# Patient Record
Sex: MALE | HISPANIC OR LATINO | Employment: PART TIME | URBAN - METROPOLITAN AREA
[De-identification: names, ages, dates, MRNs, and addresses within clinical notes are randomized per-mention and may not be internally consistent; named-entity substitution may affect disease eponyms.]

---

## 2021-05-23 ENCOUNTER — HOSPITAL ENCOUNTER (OUTPATIENT)
Facility: HOSPITAL | Age: 51
Setting detail: OBSERVATION
Discharge: HOME | End: 2021-05-24
Attending: EMERGENCY MEDICINE | Admitting: EMERGENCY MEDICINE

## 2021-05-23 ENCOUNTER — APPOINTMENT (OUTPATIENT)
Dept: GENERAL RADIOLOGY | Facility: HOSPITAL | Age: 51
End: 2021-05-23

## 2021-05-23 DIAGNOSIS — N18.6 ESRD (END STAGE RENAL DISEASE) ON DIALYSIS (HCC): ICD-10-CM

## 2021-05-23 DIAGNOSIS — E87.5 HYPERKALEMIA: Primary | ICD-10-CM

## 2021-05-23 DIAGNOSIS — Z99.2 ESRD (END STAGE RENAL DISEASE) ON DIALYSIS (HCC): ICD-10-CM

## 2021-05-23 LAB
ANION GAP SERPL CALCULATED.3IONS-SCNC: 19 MMOL/L (ref 5–15)
ANION GAP SERPL CALCULATED.3IONS-SCNC: 19 MMOL/L (ref 5–15)
BASOPHILS # BLD AUTO: 0.1 10*3/MM3 (ref 0–0.2)
BASOPHILS NFR BLD AUTO: 1.7 % (ref 0–1.5)
BUN SERPL-MCNC: 64 MG/DL (ref 6–20)
BUN SERPL-MCNC: 66 MG/DL (ref 6–20)
BUN/CREAT SERPL: 4.8 (ref 7–25)
BUN/CREAT SERPL: 5 (ref 7–25)
CALCIUM SPEC-SCNC: 8.5 MG/DL (ref 8.6–10.5)
CALCIUM SPEC-SCNC: 8.6 MG/DL (ref 8.6–10.5)
CHLORIDE SERPL-SCNC: 101 MMOL/L (ref 98–107)
CHLORIDE SERPL-SCNC: 102 MMOL/L (ref 98–107)
CO2 SERPL-SCNC: 15 MMOL/L (ref 22–29)
CO2 SERPL-SCNC: 15 MMOL/L (ref 22–29)
CREAT SERPL-MCNC: 13.16 MG/DL (ref 0.76–1.27)
CREAT SERPL-MCNC: 13.41 MG/DL (ref 0.76–1.27)
DEPRECATED RDW RBC AUTO: 52.1 FL (ref 37–54)
EOSINOPHIL # BLD AUTO: 0.6 10*3/MM3 (ref 0–0.4)
EOSINOPHIL NFR BLD AUTO: 9.6 % (ref 0.3–6.2)
ERYTHROCYTE [DISTWIDTH] IN BLOOD BY AUTOMATED COUNT: 15.8 % (ref 12.3–15.4)
GFR SERPL CREATININE-BSD FRML MDRD: 4 ML/MIN/1.73
GFR SERPL CREATININE-BSD FRML MDRD: 4 ML/MIN/1.73
GFR SERPL CREATININE-BSD FRML MDRD: 5 ML/MIN/1.73
GFR SERPL CREATININE-BSD FRML MDRD: 5 ML/MIN/1.73
GLUCOSE SERPL-MCNC: 112 MG/DL (ref 65–99)
GLUCOSE SERPL-MCNC: 210 MG/DL (ref 65–99)
HCT VFR BLD AUTO: 29.7 % (ref 37.5–51)
HGB BLD-MCNC: 9.9 G/DL (ref 13–17.7)
LYMPHOCYTES # BLD AUTO: 1.3 10*3/MM3 (ref 0.7–3.1)
LYMPHOCYTES NFR BLD AUTO: 19.7 % (ref 19.6–45.3)
MCH RBC QN AUTO: 32.2 PG (ref 26.6–33)
MCHC RBC AUTO-ENTMCNC: 33.4 G/DL (ref 31.5–35.7)
MCV RBC AUTO: 96.3 FL (ref 79–97)
MONOCYTES # BLD AUTO: 0.6 10*3/MM3 (ref 0.1–0.9)
MONOCYTES NFR BLD AUTO: 9 % (ref 5–12)
NEUTROPHILS NFR BLD AUTO: 4 10*3/MM3 (ref 1.7–7)
NEUTROPHILS NFR BLD AUTO: 60 % (ref 42.7–76)
NRBC BLD AUTO-RTO: 0 /100 WBC (ref 0–0.2)
PLATELET # BLD AUTO: 289 10*3/MM3 (ref 140–450)
PMV BLD AUTO: 8.5 FL (ref 6–12)
POTASSIUM SERPL-SCNC: 5.1 MMOL/L (ref 3.5–5.2)
POTASSIUM SERPL-SCNC: 5.6 MMOL/L (ref 3.5–5.2)
RBC # BLD AUTO: 3.08 10*6/MM3 (ref 4.14–5.8)
SARS-COV-2 RNA PNL SPEC NAA+PROBE: NOT DETECTED
SODIUM SERPL-SCNC: 135 MMOL/L (ref 136–145)
SODIUM SERPL-SCNC: 136 MMOL/L (ref 136–145)
WBC # BLD AUTO: 6.7 10*3/MM3 (ref 3.4–10.8)

## 2021-05-23 PROCEDURE — 99284 EMERGENCY DEPT VISIT MOD MDM: CPT

## 2021-05-23 PROCEDURE — 25010000002 HEPARIN (PORCINE) PER 1000 UNITS: Performed by: NURSE PRACTITIONER

## 2021-05-23 PROCEDURE — 93005 ELECTROCARDIOGRAM TRACING: CPT | Performed by: NURSE PRACTITIONER

## 2021-05-23 PROCEDURE — G0378 HOSPITAL OBSERVATION PER HR: HCPCS

## 2021-05-23 PROCEDURE — 96375 TX/PRO/DX INJ NEW DRUG ADDON: CPT

## 2021-05-23 PROCEDURE — 96374 THER/PROPH/DIAG INJ IV PUSH: CPT

## 2021-05-23 PROCEDURE — 87635 SARS-COV-2 COVID-19 AMP PRB: CPT | Performed by: EMERGENCY MEDICINE

## 2021-05-23 PROCEDURE — 63710000001 INSULIN REGULAR HUMAN PER 5 UNITS: Performed by: NURSE PRACTITIONER

## 2021-05-23 PROCEDURE — 80048 BASIC METABOLIC PNL TOTAL CA: CPT | Performed by: NURSE PRACTITIONER

## 2021-05-23 PROCEDURE — 85025 COMPLETE CBC W/AUTO DIFF WBC: CPT | Performed by: NURSE PRACTITIONER

## 2021-05-23 PROCEDURE — 25010000002 CALCIUM GLUCONATE-NACL 1-0.675 GM/50ML-% SOLUTION: Performed by: NURSE PRACTITIONER

## 2021-05-23 PROCEDURE — 71045 X-RAY EXAM CHEST 1 VIEW: CPT

## 2021-05-23 PROCEDURE — C9803 HOPD COVID-19 SPEC COLLECT: HCPCS

## 2021-05-23 RX ORDER — ACETAMINOPHEN 160 MG/5ML
650 SOLUTION ORAL EVERY 4 HOURS PRN
Status: DISCONTINUED | OUTPATIENT
Start: 2021-05-23 | End: 2021-05-24 | Stop reason: HOSPADM

## 2021-05-23 RX ORDER — ONDANSETRON 2 MG/ML
4 INJECTION INTRAMUSCULAR; INTRAVENOUS EVERY 6 HOURS PRN
Status: DISCONTINUED | OUTPATIENT
Start: 2021-05-23 | End: 2021-05-24 | Stop reason: HOSPADM

## 2021-05-23 RX ORDER — LOSARTAN POTASSIUM 25 MG/1
25 TABLET ORAL DAILY
Status: DISCONTINUED | OUTPATIENT
Start: 2021-05-24 | End: 2021-05-24

## 2021-05-23 RX ORDER — SODIUM CHLORIDE 0.9 % (FLUSH) 0.9 %
10 SYRINGE (ML) INJECTION EVERY 12 HOURS SCHEDULED
Status: DISCONTINUED | OUTPATIENT
Start: 2021-05-23 | End: 2021-05-24 | Stop reason: HOSPADM

## 2021-05-23 RX ORDER — ACETAMINOPHEN 325 MG/1
650 TABLET ORAL EVERY 4 HOURS PRN
Status: DISCONTINUED | OUTPATIENT
Start: 2021-05-23 | End: 2021-05-24 | Stop reason: HOSPADM

## 2021-05-23 RX ORDER — DEXTROSE MONOHYDRATE 25 G/50ML
50 INJECTION, SOLUTION INTRAVENOUS ONCE
Status: COMPLETED | OUTPATIENT
Start: 2021-05-23 | End: 2021-05-23

## 2021-05-23 RX ORDER — HEPARIN SODIUM 5000 [USP'U]/ML
5000 INJECTION, SOLUTION INTRAVENOUS; SUBCUTANEOUS EVERY 12 HOURS SCHEDULED
Status: DISCONTINUED | OUTPATIENT
Start: 2021-05-23 | End: 2021-05-24 | Stop reason: HOSPADM

## 2021-05-23 RX ORDER — SEVELAMER CARBONATE 800 MG/1
800 TABLET, FILM COATED ORAL
Status: DISCONTINUED | OUTPATIENT
Start: 2021-05-23 | End: 2021-05-24 | Stop reason: HOSPADM

## 2021-05-23 RX ORDER — LOSARTAN POTASSIUM 25 MG/1
25 TABLET ORAL DAILY
Status: ON HOLD | COMMUNITY
End: 2021-06-09 | Stop reason: SDUPTHER

## 2021-05-23 RX ORDER — CALCIUM GLUCONATE 20 MG/ML
1 INJECTION, SOLUTION INTRAVENOUS ONCE
Status: COMPLETED | OUTPATIENT
Start: 2021-05-23 | End: 2021-05-23

## 2021-05-23 RX ORDER — CLONIDINE HYDROCHLORIDE 0.3 MG/1
0.3 TABLET ORAL 3 TIMES DAILY
COMMUNITY

## 2021-05-23 RX ORDER — ALBUMIN (HUMAN) 12.5 G/50ML
12.5 SOLUTION INTRAVENOUS AS NEEDED
Status: CANCELLED | OUTPATIENT
Start: 2021-05-23 | End: 2021-05-24

## 2021-05-23 RX ORDER — SODIUM CHLORIDE 0.9 % (FLUSH) 0.9 %
10 SYRINGE (ML) INJECTION AS NEEDED
Status: DISCONTINUED | OUTPATIENT
Start: 2021-05-23 | End: 2021-05-24 | Stop reason: HOSPADM

## 2021-05-23 RX ORDER — SODIUM POLYSTYRENE SULFONATE 15 G/60ML
30 SUSPENSION ORAL; RECTAL ONCE
Status: COMPLETED | OUTPATIENT
Start: 2021-05-23 | End: 2021-05-23

## 2021-05-23 RX ORDER — ACETAMINOPHEN 325 MG/1
650 TABLET ORAL EVERY 6 HOURS PRN
COMMUNITY

## 2021-05-23 RX ORDER — CLONIDINE HYDROCHLORIDE 0.1 MG/1
0.3 TABLET ORAL 3 TIMES DAILY
Status: DISCONTINUED | OUTPATIENT
Start: 2021-05-23 | End: 2021-05-24 | Stop reason: HOSPADM

## 2021-05-23 RX ORDER — CALCITRIOL 0.25 UG/1
0.25 CAPSULE, LIQUID FILLED ORAL DAILY
Status: DISCONTINUED | OUTPATIENT
Start: 2021-05-24 | End: 2021-05-24 | Stop reason: HOSPADM

## 2021-05-23 RX ORDER — NIFEDIPINE 90 MG/1
45 TABLET, EXTENDED RELEASE ORAL DAILY PRN
COMMUNITY

## 2021-05-23 RX ORDER — SEVELAMER HYDROCHLORIDE 800 MG/1
800 TABLET, FILM COATED ORAL
COMMUNITY
End: 2021-06-23

## 2021-05-23 RX ORDER — CALCITRIOL 0.25 UG/1
0.25 CAPSULE, LIQUID FILLED ORAL DAILY
COMMUNITY

## 2021-05-23 RX ORDER — NIFEDIPINE 90 MG/1
90 TABLET, EXTENDED RELEASE ORAL DAILY
Status: DISCONTINUED | OUTPATIENT
Start: 2021-05-24 | End: 2021-05-24 | Stop reason: HOSPADM

## 2021-05-23 RX ORDER — ONDANSETRON 4 MG/1
4 TABLET, FILM COATED ORAL EVERY 6 HOURS PRN
Status: DISCONTINUED | OUTPATIENT
Start: 2021-05-23 | End: 2021-05-24 | Stop reason: HOSPADM

## 2021-05-23 RX ORDER — ACETAMINOPHEN 650 MG/1
650 SUPPOSITORY RECTAL EVERY 4 HOURS PRN
Status: DISCONTINUED | OUTPATIENT
Start: 2021-05-23 | End: 2021-05-24 | Stop reason: HOSPADM

## 2021-05-23 RX ORDER — ACETAMINOPHEN 325 MG/1
650 TABLET ORAL EVERY 6 HOURS PRN
Status: DISCONTINUED | OUTPATIENT
Start: 2021-05-23 | End: 2021-05-24 | Stop reason: HOSPADM

## 2021-05-23 RX ADMIN — SODIUM BICARBONATE 50 MEQ: 84 INJECTION, SOLUTION INTRAVENOUS at 17:30

## 2021-05-23 RX ADMIN — CALCIUM GLUCONATE 1 G: 20 INJECTION, SOLUTION INTRAVENOUS at 17:31

## 2021-05-23 RX ADMIN — SEVELAMER CARBONATE 800 MG: 800 TABLET, FILM COATED ORAL at 19:02

## 2021-05-23 RX ADMIN — Medication 10 ML: at 20:10

## 2021-05-23 RX ADMIN — SODIUM POLYSTYRENE SULFONATE 30 G: 15 SUSPENSION ORAL; RECTAL at 17:31

## 2021-05-23 RX ADMIN — INSULIN HUMAN 10 UNITS: 100 INJECTION, SOLUTION PARENTERAL at 17:31

## 2021-05-23 RX ADMIN — CLONIDINE HYDROCHLORIDE 0.3 MG: 0.1 TABLET ORAL at 19:02

## 2021-05-23 RX ADMIN — DEXTROSE MONOHYDRATE 50 ML: 500 INJECTION PARENTERAL at 17:31

## 2021-05-23 RX ADMIN — HEPARIN SODIUM 5000 UNITS: 5000 INJECTION INTRAVENOUS; SUBCUTANEOUS at 22:33

## 2021-05-23 NOTE — ED NOTES
S/w ghanshyam at [facility] was aware of Dr Short's orders in EPIC     HirschauerKatya, RN  05/23/21 6050

## 2021-05-23 NOTE — ED NOTES
I called and requested the recent d/c summary from [facility].     Arabella Velasquez, RegSched Rep  05/23/21 3426

## 2021-05-23 NOTE — H&P
[facility] Observation Unit H&P    Patient Name: Josh Klein  : 1970  MRN: [id number]  Primary Care Physician: Provider, No Known  Date of admission: 2021     Patient Care Team:  Provider, No Known as PCP - General          Subjective   History Present Illness     Chief Complaint:   Chief Complaint   Patient presents with   • Vascular Access Problem         Mr. Klein is a 50 y.o.  presents to [facility] complaining of missed dialysis         50-year-old male presents to the ER with a chief complaint of missed dialysis secondary to lack of insurance.  The patient was seen at [facility] and diagnosed with end-stage renal disease with start of dialysis on 5/3/2021.  The patient was set up with outpatient dialysis at [facility] in [place] but his insurance had not cleared and he could not take the appointment.  The patient speaks Chadian but his daughter is at bedside and he prefers to use her as opposed to a interpretation phone or iPad.  The patient denies any complaints.      Review of Systems   All other systems reviewed and are negative.          Personal History     Past Medical History:   Past Medical History:   Diagnosis Date   • History of renal dialysis    • Hypertension    • Renal disorder        Surgical History:      Past Surgical History:   Procedure Laterality Date   • VASCULAR SURGERY      tunneled catheter           Family History: family history is not on file. Otherwise pertinent FHx was reviewed and unremarkable.     Social History:  reports that he has quit smoking. He has quit using smokeless tobacco. He reports that he does not drink alcohol and does not use drugs.      Medications:  Prior to Admission medications    Medication Sig Start Date End Date Taking? Authorizing Provider   acetaminophen (TYLENOL) 325 MG tablet Take 650 mg by mouth Every 6 (Six) Hours As Needed for Mild Pain .   Yes Provider, MD Ever   calcitriol (ROCALTROL) 0.25 MCG capsule Take 0.25 mcg by  mouth Daily.   Yes Provider, MD Ever   cloNIDine (CATAPRES) 0.3 MG tablet Take 0.3 mg by mouth 3 (Three) Times a Day.   Yes Provider, MD Ever   losartan (COZAAR) 25 MG tablet Take 25 mg by mouth Daily.   Yes Provider, MD Ever   NIFEdipine XL (PROCARDIA XL) 90 MG 24 hr tablet Take 90 mg by mouth Daily.   Yes Provider, MD Ever   sevelamer (RENAGEL) 800 MG tablet Take 800 mg by mouth 3 (Three) Times a Day With Meals.   Yes Provider, MD Ever       Allergies:  No Known Allergies    Objective   Objective     Vital Signs  Temp:  [98.1 °F (36.7 °C)] 98.1 °F (36.7 °C)  Heart Rate:  [74-92] 78  Resp:  [16] 16  BP: (117-146)/(69-82) 134/77  SpO2:  [99 %-100 %] 100 %  on   ;   Device (Oxygen Therapy): room air  Body mass index is 23.35 kg/m².    Physical Exam  Vitals and nursing note reviewed.   Constitutional:       Appearance: Normal appearance. He is not ill-appearing.   HENT:      Right Ear: External ear normal.      Left Ear: External ear normal.      Nose: Nose normal. No congestion or rhinorrhea.      Mouth/Throat:      Mouth: Mucous membranes are moist.   Eyes:      General: No scleral icterus.        Right eye: No discharge.         Left eye: No discharge.      Extraocular Movements: Extraocular movements intact.      Conjunctiva/sclera: Conjunctivae normal.      Pupils: Pupils are equal, round, and reactive to light.   Cardiovascular:      Rate and Rhythm: Normal rate and regular rhythm.      Pulses: Normal pulses.      Heart sounds: Normal heart sounds. No murmur heard.     Pulmonary:      Effort: Pulmonary effort is normal.      Breath sounds: Normal breath sounds.   Abdominal:      General: Bowel sounds are normal. There is no distension.      Palpations: Abdomen is soft.      Tenderness: There is no abdominal tenderness.   Musculoskeletal:         General: Normal range of motion.      Cervical back: Normal range of motion and neck supple.      Right lower leg: No edema.      Left  lower leg: No edema.   Skin:     General: Skin is warm and dry.      Capillary Refill: Capillary refill takes less than 2 seconds.   Neurological:      General: No focal deficit present.      Mental Status: He is alert and oriented to person, place, and time.   Psychiatric:         Mood and Affect: Mood normal.         Behavior: Behavior normal.         Thought Content: Thought content normal.         Judgment: Judgment normal.           Results Review:  I have personally reviewed most recent cardiac tracings, lab results and radiology images and interpretations and agree with findings.    Results from last 7 days   Lab Units 05/23/21  1251   WBC 10*3/mm3 6.70   HEMOGLOBIN g/dL 9.9*   HEMATOCRIT % 29.7*   PLATELETS 10*3/mm3 289     Results from last 7 days   Lab Units 05/23/21  1251   SODIUM mmol/L 135*   POTASSIUM mmol/L 5.6*   CHLORIDE mmol/L 101   CO2 mmol/L 15.0*   BUN mg/dL 64*   CREATININE mg/dL 13.41*   GLUCOSE mg/dL 112*   CALCIUM mg/dL 8.6     Estimated Creatinine Clearance: 5.6 mL/min (A) (by C-G formula based on SCr of 13.41 mg/dL (H)).  Brief Urine Lab Results     None          Microbiology Results (last 10 days)     ** No results found for the last 240 hours. **      Have personally reviewed the EKG noted sinus rhythm with rate 85 left bundle branch block    ECG/EMG Results (most recent)     Procedure Component Value Units Date/Time    ECG 12 Lead [[id number]] Collected: 05/23/21 1249     Updated: 05/23/21 1251     QT Interval 369 ms     Narrative:      HEART RATE= 85  bpm  RR Interval= 708  ms  NC Interval= 183  ms  P Horizontal Axis= 0  deg  P Front Axis= 57  deg  QRSD Interval= 105  ms  QT Interval= 369  ms  QRS Axis= -53  deg  T Wave Axis= 65  deg  - ABNORMAL ECG -  Sinus rhythm  Left anterior fascicular block  Probable left ventricular hypertrophy  ST elev, probable normal early repol pattern  Electronically Signed By:   Date and Time of Study: 2021-05-23 12:49:50    ECG 12 Lead [[id number]]  Resulted: 05/23/21 1621     Updated: 05/23/21 1621                  XR Chest 1 View    Result Date: 5/23/2021  No active disease.  Electronically Signed By-Dank Rodriguez MD On:5/23/2021 1:36 PM This report was finalized on [id number] by  Dank Rodriguez MD.        Estimated Creatinine Clearance: 5.6 mL/min (A) (by C-G formula based on SCr of 13.41 mg/dL (H)).    Assessment/Plan   Assessment/Plan       Active Hospital Problems    Diagnosis  POA   • Hyperkalemia [E87.5]  Yes      Resolved Hospital Problems   No resolved problems to display.       ESRD with hemodialysis: Continue Calcitrol; continue clonidine; continue Cozaar; continue Renagel; continue nifedipine    --Potassium 5.6; BUN 64    Hypertension, chronic, uncontrolled: Continue Cozaar; nifedipine; continue clonidine      VTE Prophylaxis -   Mechanical Order History:     None      Pharmalogical Order History:      Ordered     Dose Route Frequency Stop    05/23/21 1620  heparin (porcine) 5000 UNIT/ML injection 5,000 Units      5,000 Units SC Every 12 Hours Scheduled --                CODE STATUS:    Code Status and Medical Interventions:   Ordered at: 05/23/21 1620     Code Status:    CPR     Medical Interventions (Level of Support Prior to Arrest):    Full       This patient has been examined wearing appropriate Personal Protective Equipment . 05/23/21      I discussed the patient's findings and my recommendations with patient and family.      Signature:Electronically signed by LAISHA Munoz, 05/23/21, 5:10 PM EDT.

## 2021-05-23 NOTE — ED NOTES
Pt radha released from  on Tues after 13-14 days and was to continue dialysis outpatient, Pt went to the center and stated could not receive dialysis d/t no insurance. Pt is in process of obtaining coverage but is concerned about his tunneled catheter status.     Last dialysis was on Tues before he was discharged     Katya Garcia, RN  05/23/21 4294

## 2021-05-23 NOTE — ED PROVIDER NOTES
Subjective   Chief complaint: Wants vascular access evaluated      Context: Patient is a 50-year-old male who comes in with family wanting his vascular access evaluated.  He states he is unsure if he was supposed to have it flushed.  He was recently discharged from [facility] for kidney disease.  He had a Shiley and an AV fistula placed and went to follow-up for outpatient dialysis but states they would not see him because he did not have insurance.  Family member at bedside is translating and states they applied for care source a couple days ago.  He is denying any chest pain shortness of breath swelling in his legs or feet.  He states he does still make urine and has not had any decrease in output.  He denies any abdominal pain or flank pain.  Last dialysis was 6 days ago and he has missed 2 treatments.  He states he was able to get his prescriptions filled and has been taking them compliantly.    Duration: As above    Timing:    Severity: Denies    Associated symptoms: Denies          PCP: None            Review of Systems   Constitutional: Negative for fever.   HENT: Negative.    Eyes: Negative for visual disturbance.   Respiratory: Negative.    Cardiovascular: Negative.    Gastrointestinal: Negative.    Genitourinary: Negative.    Musculoskeletal: Negative.    Skin: Negative.    Allergic/Immunologic: Negative for immunocompromised state.   Neurological: Negative.    Hematological: Does not bruise/bleed easily.   Psychiatric/Behavioral: Negative for confusion.       Past Medical History:   Diagnosis Date   • History of renal dialysis    • Hypertension    • Renal disorder        No Known Allergies    Past Surgical History:   Procedure Laterality Date   • VASCULAR SURGERY      tunneled catheter       History reviewed. No pertinent family history.    Social History     Socioeconomic History   • Marital status: Single     Spouse name: Not on file   • Number of children: Not on file   • Years of education:  Not on file   • Highest education level: Not on file   Tobacco Use   • Smoking status: Former Smoker   • Smokeless tobacco: Former User   Substance and Sexual Activity   • Alcohol use: Never   • Drug use: Never   • Sexual activity: Defer           Objective   Physical Exam     Vital signs and triage nurse note reviewed.   Constitutional: Awake, alert; well-developed and well-nourished. No acute distress is noted. Family member at bedside is translating per patient request.  HEENT: Normocephalic, atraumatic; pupils are PERRL with intact EOM; oropharynx is pink and moist without exudate or erythema.   Neck: Supple, full range of motion without pain;    Cardiovascular: Regular rate and rhythm, normal S1-S2.   Pulmonary: Respiratory effort regular nonlabored, breath sounds clear to auscultation all fields.   Abdomen: Soft, nontender nondistended with normoactive bowel sounds; no rebound or guarding.   Musculoskeletal: Independent range of motion of all extremities with no palpable tenderness or edema.  Shiley in the right upper chest wall without any cellulitic changes swelling or erythema.  AV fistula in the right wrist   Neuro: Alert oriented x3, speech is clear and appropriate, GCS 15   Skin:  Fleshtone warm, dry, intact; no erythematous or petechial rash or lesion       ECG 12 Lead      Date/Time: 5/23/2021 12:49 PM  Performed by: Lizzie Feliciano APRN  Authorized by: Lizzie Feliciano APRN   Interpreted by physician (michelle)  Previous ECG: no previous ECG available  Rhythm: sinus rhythm  BPM: 85  Comments: LVH                 ED Course  ED Course as of May 23 1621   Sun May 23, 2021   1406  nephrology paged    [JW]   6070 Patient is resistant to admission and dialysis due to not having insurance.   was contacted.  He refused hyperkalemic medications at this time.    [JW]   1446 Spoke with the  who is looking into it.    [JW]   9572 Waiting on [facility] to evaluate patient    [JW]       ED Course User Index  [JW] Lizzie Feliciano APRN           Labs Reviewed   BASIC METABOLIC PANEL - Abnormal; Notable for the following components:       Result Value    Glucose 112 (*)     BUN 64 (*)     Creatinine 13.41 (*)     Sodium 135 (*)     Potassium 5.6 (*)     CO2 15.0 (*)     eGFR   Amer 5 (*)     eGFR Non African Amer 4 (*)     BUN/Creatinine Ratio 4.8 (*)     Anion Gap 19.0 (*)     All other components within normal limits    Narrative:     GFR Normal >60  Chronic Kidney Disease <60  Kidney Failure <15     CBC WITH AUTO DIFFERENTIAL - Abnormal; Notable for the following components:    RBC 3.08 (*)     Hemoglobin 9.9 (*)     Hematocrit 29.7 (*)     RDW 15.8 (*)     Eosinophil % 9.6 (*)     Basophil % 1.7 (*)     Eosinophils, Absolute 0.60 (*)     All other components within normal limits   BASIC METABOLIC PANEL   CBC AND DIFFERENTIAL    Narrative:     The following orders were created for panel order CBC & Differential.  Procedure                               Abnormality         Status                     ---------                               -----------         ------                     CBC Auto Differential[[id number]]        Abnormal            Final result                 Please view results for these tests on the individual orders.     Medications   dextrose (D50W) 25 g/ 50mL Intravenous Solution 50 mL (has no administration in time range)   insulin regular (humuLIN R,novoLIN R) injection 10 Units (has no administration in time range)   sodium bicarbonate injection 8.4% 50 mEq (has no administration in time range)   calcium gluconate 1g/50ml 0.675% NaCl IV SOLN (has no administration in time range)   sodium polystyrene (KAYEXALATE) 15 GM/60ML suspension 30 g (has no administration in time range)   sodium chloride 0.9 % flush 10 mL (has no administration in time range)   sodium chloride 0.9 % flush 10 mL (has no administration in time range)   heparin (porcine) 5000 UNIT/ML injection  5,000 Units (has no administration in time range)   acetaminophen (TYLENOL) tablet 650 mg (has no administration in time range)     Or   acetaminophen (TYLENOL) 160 MG/5ML solution 650 mg (has no administration in time range)     Or   acetaminophen (TYLENOL) suppository 650 mg (has no administration in time range)   ondansetron (ZOFRAN) tablet 4 mg (has no administration in time range)     Or   ondansetron (ZOFRAN) injection 4 mg (has no administration in time range)     XR Chest 1 View    Result Date: 5/23/2021  No active disease.  Electronically Signed By-Dank Rodriguez MD On:5/23/2021 1:36 PM This report was finalized on [id number] by  Dank Rodriguez MD.    Prior to Admission medications    Medication Sig Start Date End Date Taking? Authorizing Provider   acetaminophen (TYLENOL) 325 MG tablet Take 650 mg by mouth Every 6 (Six) Hours As Needed for Mild Pain .   Yes Ever Christy MD   calcitriol (ROCALTROL) 0.25 MCG capsule Take 0.25 mcg by mouth Daily.   Yes Ever Christy MD   cloNIDine (CATAPRES) 0.3 MG tablet Take 0.3 mg by mouth 3 (Three) Times a Day.   Yes Ever Christy MD   losartan (COZAAR) 25 MG tablet Take 25 mg by mouth Daily.   Yes Ever Christy MD   NIFEdipine XL (PROCARDIA XL) 90 MG 24 hr tablet Take 90 mg by mouth Daily.   Yes Ever Christy MD   sevelamer (RENAGEL) 800 MG tablet Take 800 mg by mouth 3 (Three) Times a Day With Meals.   Yes ProviderEver MD                                     [facility]  Number of Diagnoses or Management Options  ESRD (end stage renal disease) on dialysis (CMS/[facility])  Hyperkalemia  Diagnosis management comments: Records obtained from [facility]: Patient was seen 5/4-5/18/21: CKD 5, hyperkalemia, hypertension, anemia.  2D echo shows a mildly calcified aortic valve without stenosis, trace mitral regurg, trace tricuspid regurg.  Tunneled cath was placed, right wrist AV fistula placed on 5/7/2021.      Comorbidities:  has a past medical  history of History of renal dialysis, Hypertension, and Renal disorder.  Differentials:   Tried abnormalities pleural effusion not all inclusive of differentials considered  Discussion with provider: Fabiola  Radiology interpretation:  X-rays reviewed by me and interpreted by radiologist,   XR Chest 1 View    Result Date: 5/23/2021  No active disease.  Electronically Signed By-Dank Rodriguez MD On:5/23/2021 1:36 PM This report was finalized on [id number] by  Dank Rodriguez MD.    Lab interpretation:  Labs viewed by me significant for, potassium 5.6, creatinine 13.4, BUN 64    Appropriate PPE worn during exam.  Patient was reluctant  For admission & cost was addressed.   and [facility] into discussed patient.  Emergency Medicaid assistance is pending as patient does not currently have a green card.  She states that his application will be canceled if he leaves AGAINST MEDICAL ADVICE.  I discussed with the ER eval nephrology group who states  They do not need patient transfer to [facility] he can have dialysis here in follow-up at [facility] ER twice a week until his eligibility goes through  Was given hyperkalemic medications    i discussed findings with patient who voices understanding of admission to our observation unit for dialysis      Final diagnoses:   Hyperkalemia   ESRD (end stage renal disease) on dialysis (CMS/[facility])       ED Disposition  ED Disposition     ED Disposition Condition Comment    Decision to Admit            No follow-up provider specified.       Medication List      No changes were made to your prescriptions during this visit.          Lizzie Feliciano, APRN  05/23/21 8302

## 2021-05-23 NOTE — PROGRESS NOTES
Case Management/Social Work    Patient Name:  Josh Klein  YOB: 1970  MRN: [id number]  Admit Date:  5/23/2021        Met with pt, matthew and Nicko (Carmela) at bedside as requested by ED staff. Pt is concerned about coverage of treatment d/t self-pay status. Pt is currently awaiting Emergency Medicaid coverage that was initiated by [facility]. Per pt and d/c paperwork oupt HD was arranged with [facility] prior to discharge from hospital. Pt was discharged fro [facility] Tues, 5/18 and was to have scheduled HD at [facility] on Tues, Thurs and Sat. Per pt's neice, HD was unable to be started due to no insurance coverage. Becky states that pt will not have presumptive coverage due to emergent medicaid coverage application, but will provide financial assistance application if determination of emergent medicaid coverage is denied. Updated pt and neice. Pt is needing HD arranged but unable to do so until coverage is established.       Electronically signed by:  Caro Nicole RN  05/23/21 17:16 EDT

## 2021-05-24 VITALS
BODY MASS INDEX: 23.07 KG/M2 | SYSTOLIC BLOOD PRESSURE: 151 MMHG | TEMPERATURE: 98.6 F | OXYGEN SATURATION: 99 % | HEART RATE: 61 BPM | RESPIRATION RATE: 16 BRPM | HEIGHT: 64 IN | DIASTOLIC BLOOD PRESSURE: 89 MMHG | WEIGHT: 135.14 LBS

## 2021-05-24 PROBLEM — E87.5 HYPERKALEMIA: Status: RESOLVED | Noted: 2021-05-23 | Resolved: 2021-05-24

## 2021-05-24 PROBLEM — Z99.2 ESRD (END STAGE RENAL DISEASE) ON DIALYSIS: Status: ACTIVE | Noted: 2021-05-24

## 2021-05-24 PROBLEM — N18.6 ESRD (END STAGE RENAL DISEASE) ON DIALYSIS: Status: ACTIVE | Noted: 2021-05-24

## 2021-05-24 LAB
ANION GAP SERPL CALCULATED.3IONS-SCNC: 13 MMOL/L (ref 5–15)
BASOPHILS # BLD AUTO: 0.1 10*3/MM3 (ref 0–0.2)
BASOPHILS NFR BLD AUTO: 1.2 % (ref 0–1.5)
BUN SERPL-MCNC: 40 MG/DL (ref 6–20)
BUN/CREAT SERPL: 5 (ref 7–25)
CALCIUM SPEC-SCNC: 8.5 MG/DL (ref 8.6–10.5)
CHLORIDE SERPL-SCNC: 98 MMOL/L (ref 98–107)
CO2 SERPL-SCNC: 24 MMOL/L (ref 22–29)
CREAT SERPL-MCNC: 8.06 MG/DL (ref 0.76–1.27)
DEPRECATED RDW RBC AUTO: 49.9 FL (ref 37–54)
EOSINOPHIL # BLD AUTO: 0.5 10*3/MM3 (ref 0–0.4)
EOSINOPHIL NFR BLD AUTO: 6.7 % (ref 0.3–6.2)
ERYTHROCYTE [DISTWIDTH] IN BLOOD BY AUTOMATED COUNT: 15.4 % (ref 12.3–15.4)
GFR SERPL CREATININE-BSD FRML MDRD: 7 ML/MIN/1.73
GFR SERPL CREATININE-BSD FRML MDRD: 9 ML/MIN/1.73
GLUCOSE SERPL-MCNC: 92 MG/DL (ref 65–99)
HBV SURFACE AG SERPL QL IA: NORMAL
HCT VFR BLD AUTO: 27.7 % (ref 37.5–51)
HGB BLD-MCNC: 9.5 G/DL (ref 13–17.7)
LYMPHOCYTES # BLD AUTO: 0.8 10*3/MM3 (ref 0.7–3.1)
LYMPHOCYTES NFR BLD AUTO: 9.9 % (ref 19.6–45.3)
MCH RBC QN AUTO: 31.7 PG (ref 26.6–33)
MCHC RBC AUTO-ENTMCNC: 34.2 G/DL (ref 31.5–35.7)
MCV RBC AUTO: 92.8 FL (ref 79–97)
MONOCYTES # BLD AUTO: 0.9 10*3/MM3 (ref 0.1–0.9)
MONOCYTES NFR BLD AUTO: 11.3 % (ref 5–12)
NEUTROPHILS NFR BLD AUTO: 5.4 10*3/MM3 (ref 1.7–7)
NEUTROPHILS NFR BLD AUTO: 70.9 % (ref 42.7–76)
NRBC BLD AUTO-RTO: 0.1 /100 WBC (ref 0–0.2)
PLATELET # BLD AUTO: 126 10*3/MM3 (ref 140–450)
PMV BLD AUTO: 7.9 FL (ref 6–12)
POTASSIUM SERPL-SCNC: 4.1 MMOL/L (ref 3.5–5.2)
RBC # BLD AUTO: 2.98 10*6/MM3 (ref 4.14–5.8)
SODIUM SERPL-SCNC: 135 MMOL/L (ref 136–145)
WBC # BLD AUTO: 7.6 10*3/MM3 (ref 3.4–10.8)

## 2021-05-24 PROCEDURE — 80048 BASIC METABOLIC PNL TOTAL CA: CPT | Performed by: NURSE PRACTITIONER

## 2021-05-24 PROCEDURE — 25010000002 EPOETIN ALFA-EPBX 10000 UNIT/ML SOLUTION: Performed by: INTERNAL MEDICINE

## 2021-05-24 PROCEDURE — G0257 UNSCHED DIALYSIS ESRD PT HOS: HCPCS

## 2021-05-24 PROCEDURE — G0378 HOSPITAL OBSERVATION PER HR: HCPCS

## 2021-05-24 PROCEDURE — 25010000002 HEPARIN (PORCINE) PER 1000 UNITS: Performed by: NURSE PRACTITIONER

## 2021-05-24 PROCEDURE — 85025 COMPLETE CBC W/AUTO DIFF WBC: CPT | Performed by: NURSE PRACTITIONER

## 2021-05-24 PROCEDURE — 87340 HEPATITIS B SURFACE AG IA: CPT | Performed by: INTERNAL MEDICINE

## 2021-05-24 PROCEDURE — 25010000002 HEPARIN LOCK FLUSH PER 10 UNITS: Performed by: INTERNAL MEDICINE

## 2021-05-24 RX ORDER — SODIUM BICARBONATE 650 MG/1
650 TABLET ORAL 3 TIMES DAILY
Status: DISCONTINUED | OUTPATIENT
Start: 2021-05-24 | End: 2021-05-24 | Stop reason: HOSPADM

## 2021-05-24 RX ORDER — SODIUM BICARBONATE 650 MG/1
650 TABLET ORAL 3 TIMES DAILY
Qty: 90 TABLET | Refills: 1 | Status: SHIPPED | OUTPATIENT
Start: 2021-05-24

## 2021-05-24 RX ORDER — HEPARIN SODIUM (PORCINE) LOCK FLUSH IV SOLN 100 UNIT/ML 100 UNIT/ML
2700 SOLUTION INTRAVENOUS ONCE
Status: COMPLETED | OUTPATIENT
Start: 2021-05-24 | End: 2021-05-24

## 2021-05-24 RX ORDER — HEPARIN SODIUM (PORCINE) LOCK FLUSH IV SOLN 100 UNIT/ML 100 UNIT/ML
2700 SOLUTION INTRAVENOUS ONCE
Status: CANCELLED | OUTPATIENT
Start: 2021-05-24

## 2021-05-24 RX ORDER — LOSARTAN POTASSIUM 50 MG/1
50 TABLET ORAL DAILY
Status: DISCONTINUED | OUTPATIENT
Start: 2021-05-24 | End: 2021-05-24 | Stop reason: HOSPADM

## 2021-05-24 RX ORDER — HYDRALAZINE HYDROCHLORIDE 25 MG/1
50 TABLET, FILM COATED ORAL ONCE
Status: COMPLETED | OUTPATIENT
Start: 2021-05-24 | End: 2021-05-24

## 2021-05-24 RX ADMIN — HEPARIN SODIUM 5000 UNITS: 5000 INJECTION INTRAVENOUS; SUBCUTANEOUS at 10:04

## 2021-05-24 RX ADMIN — Medication 10 ML: at 13:33

## 2021-05-24 RX ADMIN — SEVELAMER CARBONATE 800 MG: 800 TABLET, FILM COATED ORAL at 10:05

## 2021-05-24 RX ADMIN — HYDRALAZINE HYDROCHLORIDE 50 MG: 25 TABLET, FILM COATED ORAL at 13:33

## 2021-05-24 RX ADMIN — CALCITRIOL CAPSULES 0.25 MCG 0.25 MCG: 0.25 CAPSULE ORAL at 10:05

## 2021-05-24 RX ADMIN — SODIUM BICARBONATE 650 MG TABLET 650 MG: at 10:05

## 2021-05-24 RX ADMIN — EPOETIN ALFA-EPBX 10000 UNITS: 10000 INJECTION, SOLUTION INTRAVENOUS; SUBCUTANEOUS at 09:40

## 2021-05-24 RX ADMIN — SEVELAMER CARBONATE 800 MG: 800 TABLET, FILM COATED ORAL at 13:33

## 2021-05-24 RX ADMIN — HEPARIN SODIUM (PORCINE) LOCK FLUSH IV SOLN 100 UNIT/ML 2700 UNITS: 100 SOLUTION at 10:14

## 2021-05-24 RX ADMIN — CLONIDINE HYDROCHLORIDE 0.3 MG: 0.1 TABLET ORAL at 10:04

## 2021-05-24 RX ADMIN — NIFEDIPINE 90 MG: 90 TABLET, FILM COATED, EXTENDED RELEASE ORAL at 10:05

## 2021-05-24 RX ADMIN — LOSARTAN POTASSIUM 50 MG: 50 TABLET, FILM COATED ORAL at 10:05

## 2021-05-24 NOTE — CONSULTS
RENAL/KCC:    Referring Provider: Dr. Mcnair  Reason for Consultation: ESRD    Subjective     Chief complaint: Needs dialysis    History of present illness:  Patient is a 49 yo  male with ESRD who recently initiated HD at [facility] but was unable to have an outpatient HD spot secured prior to discharge.  He is apparently emergency medicaid pending.  They were trying to set him up at [facility] in [place].  He presents to the ER with hyperkalemia and uncontrolled BP as well as metabolic acidosis.  He is seen about to be put on dialysis.  He denies any CP or SOA.  No N/V/D.  No other complaints at present time.    History  Past Medical History:   Diagnosis Date   • History of renal dialysis    • Hypertension    • Renal disorder    ,   Past Surgical History:   Procedure Laterality Date   • VASCULAR SURGERY      tunneled catheter   ,   Family History   Problem Relation Age of Onset   • Kidney disease Mother    ,   Social History     Socioeconomic History   • Marital status: Single     Spouse name: Not on file   • Number of children: Not on file   • Years of education: Not on file   • Highest education level: Not on file   Tobacco Use   • Smoking status: Former Smoker   • Smokeless tobacco: Former User   Vaping Use   • Vaping Use: Never used   Substance and Sexual Activity   • Alcohol use: Never   • Drug use: Never   • Sexual activity: Defer     E-cigarette/Vaping   • E-cigarette/Vaping Use Never User      E-cigarette/Vaping Substances     E-cigarette/Vaping Devices       ,   Medications Prior to Admission   Medication Sig Dispense Refill Last Dose   • acetaminophen (TYLENOL) 325 MG tablet Take 650 mg by mouth Every 6 (Six) Hours As Needed for Mild Pain .      • calcitriol (ROCALTROL) 0.25 MCG capsule Take 0.25 mcg by mouth Daily.   5/23/2021 at Unknown time   • cloNIDine (CATAPRES) 0.3 MG tablet Take 0.3 mg by mouth 3 (Three) Times a Day.   5/23/2021 at Unknown time   • losartan (COZAAR) 25 MG tablet Take 25 mg by mouth  Daily.   5/23/2021 at Unknown time   • NIFEdipine XL (PROCARDIA XL) 90 MG 24 hr tablet Take 90 mg by mouth Daily.   5/23/2021 at Unknown time   • sevelamer (RENAGEL) 800 MG tablet Take 800 mg by mouth 3 (Three) Times a Day With Meals.   5/23/2021 at Unknown time   , Scheduled Meds:  calcitriol, 0.25 mcg, Oral, Daily  cloNIDine, 0.3 mg, Oral, TID  heparin (porcine), 5,000 Units, Subcutaneous, Q12H  losartan, 50 mg, Oral, Daily  NIFEdipine XL, 90 mg, Oral, Daily  sevelamer, 800 mg, Oral, TID With Meals  sodium bicarbonate, 650 mg, Oral, TID  sodium chloride, 10 mL, Intravenous, Q12H    , Continuous Infusions:   , PRN Meds:  •  acetaminophen **OR** acetaminophen **OR** acetaminophen  •  acetaminophen  •  ondansetron **OR** ondansetron  •  sodium chloride and Allergies:  Patient has no known allergies.    Review of Systems  Pertinent items are noted in HPI    Objective     Vital Signs  Temp:  [98 °F (36.7 °C)-98.7 °F (37.1 °C)] 98.7 °F (37.1 °C)  Heart Rate:  [71-92] 80  Resp:  [16-20] 18  BP: (117-188)/(69-98) 188/88    No intake/output data recorded.  No intake/output data recorded.    Physical Exam:  GEN: Awake, NAD  ENT: PERRL, EOMI, MMM  NECK: Supple, no JVD  CHEST: CTAB, no W/R/C  CV: RRR, no M/G/R  ABD: Soft, NT, +BS  SKIN: Warm and Dry  NEURO: CN's intact      Results Review:   I reviewed the patient's new clinical results.    WBC WBC   Date Value Ref Range Status   05/23/2021 6.70 3.40 - 10.80 10*3/mm3 Final      HGB Hemoglobin   Date Value Ref Range Status   05/23/2021 9.9 (L) 13.0 - 17.7 g/dL Final      HCT Hematocrit   Date Value Ref Range Status   05/23/2021 29.7 (L) 37.5 - 51.0 % Final      Platlets No results found for: LABPLAT   MCV MCV   Date Value Ref Range Status   05/23/2021 96.3 79.0 - 97.0 fL Final          Sodium Sodium   Date Value Ref Range Status   05/23/2021 136 136 - 145 mmol/L Final   05/23/2021 135 (L) 136 - 145 mmol/L Final      Potassium Potassium   Date Value Ref Range Status    05/23/2021 5.1 3.5 - 5.2 mmol/L Final   05/23/2021 5.6 (H) 3.5 - 5.2 mmol/L Final      Chloride Chloride   Date Value Ref Range Status   05/23/2021 102 98 - 107 mmol/L Final   05/23/2021 101 98 - 107 mmol/L Final      CO2 CO2   Date Value Ref Range Status   05/23/2021 15.0 (L) 22.0 - 29.0 mmol/L Final   05/23/2021 15.0 (L) 22.0 - 29.0 mmol/L Final      BUN BUN   Date Value Ref Range Status   05/23/2021 66 (H) 6 - 20 mg/dL Final   05/23/2021 64 (H) 6 - 20 mg/dL Final      Creatinine Creatinine   Date Value Ref Range Status   05/23/2021 13.16 (H) 0.76 - 1.27 mg/dL Final   05/23/2021 13.41 (H) 0.76 - 1.27 mg/dL Final      Calcium Calcium   Date Value Ref Range Status   05/23/2021 8.5 (L) 8.6 - 10.5 mg/dL Final   05/23/2021 8.6 8.6 - 10.5 mg/dL Final      PO4 No results found for: CAPO4   Albumin No results found for: ALBUMIN   Magnesium No results found for: MG   Uric Acid No results found for: URICACID         calcitriol, 0.25 mcg, Oral, Daily  cloNIDine, 0.3 mg, Oral, TID  heparin (porcine), 5,000 Units, Subcutaneous, Q12H  losartan, 50 mg, Oral, Daily  NIFEdipine XL, 90 mg, Oral, Daily  sevelamer, 800 mg, Oral, TID With Meals  sodium bicarbonate, 650 mg, Oral, TID  sodium chloride, 10 mL, Intravenous, Q12H           Assessment/Plan     1) ESRD  2) Hyperkalemia  3) Uncontrolled HTN  4) DM  5) Anemia of CKD  6) Metabolic acidosis    PLAN: To receive HD this AM.  Titrate Losartan and continue remaining BP meds.  Add scheduled PO bicarbonate.  Epo with HD.  Will ask Social Work to follow-up with patient to see if any updates on patent insurance or ability to secure an outpatient HD spot.  Would be OK for D/C after HD today.      Hitesh Streeter MD   [facility]  05/24/21  @NOW

## 2021-05-24 NOTE — NURSING NOTE
DIALYSIS    Pre treatment vitals    06:18    BP  205/105  HR  59  R  18  Temp. 97.5 (temporal)    Patient BP persistently high despite fluid removal and relative low blood volume alarms. Nephrology paged x3. Dr. Short returned page. Informed her of patient elevated BP near the end of dialysis.  Dr. Short ordered discontinue treatment. Treatment discontinued.     Removed 1412 ml net fluid.    Post treatment vitals    10:49          BP  195/113  HR  66  R  18  Temp 97.7 (temporal)

## 2021-05-24 NOTE — NURSING NOTE
Dialysis nurse called to let this rn know she would be here between 1-2 am to do patients dialysis. Pt informed

## 2021-05-24 NOTE — DISCHARGE SUMMARY
[facility]  DISCHARGE SUMMARY        Prepared For PCP:  Provider, No Known    Patient Name: Josh Klein  : 1970  MRN: [id number]      Date of Admission:   2021    Date of Discharge:  2021    Length of stay:  LOS: 0 days     Hospital Course     Presenting Problem:   Hyperkalemia [E87.5]  ESRD (end stage renal disease) on dialysis (CMS/[facility]) [N18.6, Z99.2]      Active Hospital Problems    Diagnosis  POA   • ESRD (end stage renal disease) on dialysis (CMS/[facility]) [N18.6, Z99.2]  Not Applicable      Resolved Hospital Problems    Diagnosis Date Resolved POA   • Hyperkalemia [E87.5] 2021 Yes     Priority: High           Hospital Course:  Josh Klein is a 50 y.o. male who presented to the ER with a chief complaint of missed dialysis secondary to insurance complication.  The hope was to dialyze the patient overnight so that he could be discharged early this morning.  However, there was a delay in dialysis starting and he was dialyzed in the a.m. on 2021.  He was seen by nephrology who added oral sodium bicarb and erythropoietin to his hemodialysis secondary to anabolic acidosis and anemia with hemoglobin 9.9.  Hemoglobin after dialysis was 9.5.  Patient became hypertensive during dialysis but stabilized after administration of his scheduled home medications.  The patient was seen by case management who recommended the patient continue with his pursuit of emergency insurance.  The patient and his family are arranging to go to [place] to help facilitate his insurance application.  Patient has tentative arrangements with [facility] dialysis center in [place] for outpatient dialysis.  Patient remained moderately hypertensive after dialysis despite having received his antihypertensives postprocedure.  I add hydralazine 50 mg p.o. x1 with plans for patient discharge as long as systolic blood pressure less than 170.          Recommendation for Outpatient Providers:     Monitor  platelets; monitor hemoglobin        Reasons For Change In Medications and Indications for New Medications:  Addition of sodium bicarb per nephrology commendations; nephrology ordering erythropoietin during hemodialysis      Day of Discharge     HPI: 50-year-old male presents to the ER with a chief complaint of missed dialysis secondary to lack of insurance.  The patient was seen at [facility] and diagnosed with end-stage renal disease with start of dialysis on 5/3/2021.  The patient was set up with outpatient dialysis at [facility] in [place] but his insurance had not cleared and he could not take the appointment.  The patient speaks Syriac but his daughter is at bedside and he prefers to use her as opposed to a interpretation phone or iPad.  The patient denies any complaints.      Vital Signs:   Temp:  [97.5 °F (36.4 °C)-98.7 °F (37.1 °C)] 98.6 °F (37 °C)  Heart Rate:  [59-80] 61  Resp:  [16-20] 16  BP: (119-205)/() 151/89     ROS:  Review of Systems   Constitutional:        The patient reports he is sleepy because he did not get any rest last night.   All other systems reviewed and are negative.      Physical Exam:  Physical Exam  Vitals and nursing note reviewed.   Constitutional:       Appearance: Normal appearance.      Comments: Sleepy but awakens fully   HENT:      Head: Normocephalic and atraumatic.      Right Ear: External ear normal.      Left Ear: External ear normal.      Nose: Nose normal. No congestion or rhinorrhea.      Mouth/Throat:      Mouth: Mucous membranes are moist.   Eyes:      General: No scleral icterus.        Right eye: No discharge.         Left eye: No discharge.      Extraocular Movements: Extraocular movements intact.      Conjunctiva/sclera: Conjunctivae normal.      Pupils: Pupils are equal, round, and reactive to light.   Cardiovascular:      Rate and Rhythm: Normal rate and regular rhythm.      Pulses: Normal pulses.      Heart sounds: Normal heart sounds.   Pulmonary:       Effort: Pulmonary effort is normal.      Breath sounds: Normal breath sounds.   Abdominal:      General: Bowel sounds are normal. There is no distension.      Palpations: Abdomen is soft.   Musculoskeletal:         General: Normal range of motion.      Cervical back: Normal range of motion and neck supple.      Right lower leg: No edema.      Left lower leg: No edema.   Skin:     General: Skin is warm and dry.      Capillary Refill: Capillary refill takes less than 2 seconds.   Neurological:      General: No focal deficit present.      Mental Status: He is oriented to person, place, and time.   Psychiatric:         Mood and Affect: Mood normal.         Behavior: Behavior normal.         Thought Content: Thought content normal.         Judgment: Judgment normal.         Pertinent  and/or Most Recent Results     Results from last 7 days   Lab Units 05/24/21  1054 05/24/21  0714 05/23/21  1811 05/23/21  1251   WBC 10*3/mm3 7.60  --   --  6.70   HEMOGLOBIN g/dL 9.5*  --   --  9.9*   HEMATOCRIT % 27.7*  --   --  29.7*   PLATELETS 10*3/mm3 126*  --   --  289   SODIUM mmol/L  --  135* 136 135*   POTASSIUM mmol/L  --  4.1 5.1 5.6*   CHLORIDE mmol/L  --  98 102 101   CO2 mmol/L  --  24.0 15.0* 15.0*   BUN mg/dL  --  40* 66* 64*   CREATININE mg/dL  --  8.06* 13.16* 13.41*   GLUCOSE mg/dL  --  92 210* 112*   CALCIUM mg/dL  --  8.5* 8.5* 8.6           Invalid input(s): PROT, LABALBU        Invalid input(s): TG, LDLCALC, LDLREALC        Brief Urine Lab Results     None          Microbiology Results Abnormal     Procedure Component Value - Date/Time    COVID PRE-OP / PRE-PROCEDURE SCREENING ORDER (NO ISOLATION) - Swab, Nasopharynx [[id number]]  (Normal) Collected: 05/23/21 1659    Lab Status: Final result Specimen: Swab from Nasopharynx Updated: 05/23/21 1723    Narrative:      The following orders were created for panel order COVID PRE-OP / PRE-PROCEDURE SCREENING ORDER (NO ISOLATION) - Swab, Nasopharynx.  Procedure                                Abnormality         Status                     ---------                               -----------         ------                     COVID-19,CEPHEID,COR/DARRYN...[[id number]]  Normal              Final result                 Please view results for these tests on the individual orders.    COVID-19,CEPHEID,COR/DARRYN/PAD IN-HOUSE(OR EMERGENT/ADD-ON),NP SWAB IN TRANSPORT MEDIA 3-4 HR TAT, RT-PCR - Swab, Nasopharynx [[id number]]  (Normal) Collected: 05/23/21 1659    Lab Status: Final result Specimen: Swab from Nasopharynx Updated: 05/23/21 1723     COVID19 Not Detected    Narrative:      Fact sheet for providers: https://www.fda.gov/media/951664/download     Fact sheet for patients: https://www.fda.gov/media/243230/download  Fact sheet for providers: https://www.fda.gov/media/446019/download    Fact sheet for patients: https://www.fda.gov/media/150407/download    Test performed by PCR.          XR Chest 1 View    Result Date: 5/23/2021  Impression: No active disease.  Electronically Signed By-Dank Rodriguez MD On:5/23/2021 1:36 PM This report was finalized on [id number] by  Dank Rodriguez MD.                          Test Results Pending at Discharge        Procedures Performed           Consults:   Consults     Date and Time Order Name Status Description    5/23/2021  4:12 PM Nephrology (on -call MD unless specified) Completed             Discharge Details        Discharge Medications      New Medications      Instructions Start Date   sodium bicarbonate 650 MG tablet   650 mg, Oral, 3 Times Daily         Continue These Medications      Instructions Start Date   acetaminophen 325 MG tablet  Commonly known as: TYLENOL   650 mg, Oral, Every 6 Hours PRN      calcitriol 0.25 MCG capsule  Commonly known as: ROCALTROL   0.25 mcg, Oral, Daily      cloNIDine 0.3 MG tablet  Commonly known as: CATAPRES   0.3 mg, Oral, 3 Times Daily      losartan 25 MG tablet  Commonly known as: COZAAR   25 mg, Oral, Daily       NIFEdipine XL 90 MG 24 hr tablet  Commonly known as: PROCARDIA XL   90 mg, Oral, Daily      sevelamer 800 MG tablet  Commonly known as: RENAGEL   800 mg, Oral, 3 Times Daily With Meals             No Known Allergies      Discharge Disposition: Home with family   Home or Self Care    Diet:  Hospital:  Diet Order   Procedures   • Diet Renal; 2gm K+         Discharge Activity: as tolerated        CODE STATUS:    Code Status and Medical Interventions:   Ordered at: 05/23/21 1620     Code Status:    CPR     Medical Interventions (Level of Support Prior to Arrest):    Full         Follow-up Appointments      Keep previously arranged follow-up appointment with Medicaid services and [facility] dialysis center in [place], [place].      Condition on Discharge:      Stable      This patient has been examined wearing appropriate Personal Protective Equipment. 05/24/21      Electronically signed by LAISHA Munoz, 05/24/21, 12:04 PM EDT.      Time: I spent 60 minutes on this discharge activity which included face-to-face encounter with the patient/reviewing the data in the system/coordination of the care with the nursing staff as well as consultants/documentation/entering orders.

## 2021-05-25 LAB — QT INTERVAL: 369 MS

## 2021-05-29 ENCOUNTER — HOSPITAL ENCOUNTER (INPATIENT)
Facility: HOSPITAL | Age: 51
LOS: 11 days | Discharge: HOME | End: 2021-06-09
Attending: EMERGENCY MEDICINE | Admitting: INTERNAL MEDICINE

## 2021-05-29 ENCOUNTER — APPOINTMENT (OUTPATIENT)
Dept: GENERAL RADIOLOGY | Facility: HOSPITAL | Age: 51
End: 2021-05-29

## 2021-05-29 DIAGNOSIS — Z99.2 CHRONIC KIDNEY DISEASE ON CHRONIC DIALYSIS (HCC): Primary | ICD-10-CM

## 2021-05-29 DIAGNOSIS — N18.6 CHRONIC KIDNEY DISEASE ON CHRONIC DIALYSIS (HCC): Primary | ICD-10-CM

## 2021-05-29 LAB
ALBUMIN SERPL-MCNC: 4 G/DL (ref 3.5–5.2)
ALBUMIN/GLOB SERPL: 1.6 G/DL
ALP SERPL-CCNC: 112 U/L (ref 39–117)
ALT SERPL W P-5'-P-CCNC: 13 U/L (ref 1–41)
ANION GAP SERPL CALCULATED.3IONS-SCNC: 23 MMOL/L (ref 5–15)
AST SERPL-CCNC: 9 U/L (ref 1–40)
BASOPHILS # BLD AUTO: 0.1 10*3/MM3 (ref 0–0.2)
BASOPHILS NFR BLD AUTO: 1.2 % (ref 0–1.5)
BILIRUB SERPL-MCNC: 0.3 MG/DL (ref 0–1.2)
BUN SERPL-MCNC: 70 MG/DL (ref 6–20)
BUN/CREAT SERPL: 4.6 (ref 7–25)
CALCIUM SPEC-SCNC: 7.7 MG/DL (ref 8.6–10.5)
CHLORIDE SERPL-SCNC: 95 MMOL/L (ref 98–107)
CO2 SERPL-SCNC: 17 MMOL/L (ref 22–29)
CREAT SERPL-MCNC: 15.26 MG/DL (ref 0.76–1.27)
DEPRECATED RDW RBC AUTO: 51.2 FL (ref 37–54)
EOSINOPHIL # BLD AUTO: 1.1 10*3/MM3 (ref 0–0.4)
EOSINOPHIL NFR BLD AUTO: 14.8 % (ref 0.3–6.2)
ERYTHROCYTE [DISTWIDTH] IN BLOOD BY AUTOMATED COUNT: 15.4 % (ref 12.3–15.4)
GFR SERPL CREATININE-BSD FRML MDRD: 3 ML/MIN/1.73
GFR SERPL CREATININE-BSD FRML MDRD: 4 ML/MIN/1.73
GLOBULIN UR ELPH-MCNC: 2.5 GM/DL
GLUCOSE SERPL-MCNC: 173 MG/DL (ref 65–99)
HCT VFR BLD AUTO: 31 % (ref 37.5–51)
HGB BLD-MCNC: 10.3 G/DL (ref 13–17.7)
HOLD SPECIMEN: NORMAL
INR PPP: 0.93 (ref 0.93–1.1)
LYMPHOCYTES # BLD AUTO: 1.5 10*3/MM3 (ref 0.7–3.1)
LYMPHOCYTES NFR BLD AUTO: 20.3 % (ref 19.6–45.3)
MCH RBC QN AUTO: 31.3 PG (ref 26.6–33)
MCHC RBC AUTO-ENTMCNC: 33.2 G/DL (ref 31.5–35.7)
MCV RBC AUTO: 94.2 FL (ref 79–97)
MONOCYTES # BLD AUTO: 0.7 10*3/MM3 (ref 0.1–0.9)
MONOCYTES NFR BLD AUTO: 9.8 % (ref 5–12)
NEUTROPHILS NFR BLD AUTO: 3.9 10*3/MM3 (ref 1.7–7)
NEUTROPHILS NFR BLD AUTO: 53.9 % (ref 42.7–76)
NRBC BLD AUTO-RTO: 0 /100 WBC (ref 0–0.2)
PLATELET # BLD AUTO: 269 10*3/MM3 (ref 140–450)
PMV BLD AUTO: 8.7 FL (ref 6–12)
POTASSIUM SERPL-SCNC: 4.7 MMOL/L (ref 3.5–5.2)
PROT SERPL-MCNC: 6.5 G/DL (ref 6–8.5)
PROTHROMBIN TIME: 10.3 SECONDS (ref 9.6–11.7)
RBC # BLD AUTO: 3.29 10*6/MM3 (ref 4.14–5.8)
SARS-COV-2 RNA PNL SPEC NAA+PROBE: NOT DETECTED
SODIUM SERPL-SCNC: 135 MMOL/L (ref 136–145)
TROPONIN T SERPL-MCNC: 0.04 NG/ML (ref 0–0.03)
TROPONIN T SERPL-MCNC: 0.04 NG/ML (ref 0–0.03)
WBC # BLD AUTO: 7.2 10*3/MM3 (ref 3.4–10.8)

## 2021-05-29 PROCEDURE — 87635 SARS-COV-2 COVID-19 AMP PRB: CPT | Performed by: EMERGENCY MEDICINE

## 2021-05-29 PROCEDURE — 84484 ASSAY OF TROPONIN QUANT: CPT | Performed by: PHYSICIAN ASSISTANT

## 2021-05-29 PROCEDURE — 84484 ASSAY OF TROPONIN QUANT: CPT | Performed by: EMERGENCY MEDICINE

## 2021-05-29 PROCEDURE — 85025 COMPLETE CBC W/AUTO DIFF WBC: CPT | Performed by: EMERGENCY MEDICINE

## 2021-05-29 PROCEDURE — 80053 COMPREHEN METABOLIC PANEL: CPT | Performed by: EMERGENCY MEDICINE

## 2021-05-29 PROCEDURE — 99284 EMERGENCY DEPT VISIT MOD MDM: CPT

## 2021-05-29 PROCEDURE — G0378 HOSPITAL OBSERVATION PER HR: HCPCS

## 2021-05-29 PROCEDURE — 93005 ELECTROCARDIOGRAM TRACING: CPT | Performed by: EMERGENCY MEDICINE

## 2021-05-29 PROCEDURE — 85610 PROTHROMBIN TIME: CPT | Performed by: EMERGENCY MEDICINE

## 2021-05-29 PROCEDURE — 25010000002 HEPARIN (PORCINE) PER 1000 UNITS: Performed by: PHYSICIAN ASSISTANT

## 2021-05-29 PROCEDURE — 82746 ASSAY OF FOLIC ACID SERUM: CPT | Performed by: NURSE PRACTITIONER

## 2021-05-29 PROCEDURE — 71045 X-RAY EXAM CHEST 1 VIEW: CPT

## 2021-05-29 PROCEDURE — 82607 VITAMIN B-12: CPT | Performed by: PHYSICIAN ASSISTANT

## 2021-05-29 RX ORDER — ONDANSETRON 2 MG/ML
4 INJECTION INTRAMUSCULAR; INTRAVENOUS EVERY 6 HOURS PRN
Status: DISCONTINUED | OUTPATIENT
Start: 2021-05-29 | End: 2021-06-09 | Stop reason: HOSPADM

## 2021-05-29 RX ORDER — ALBUMIN (HUMAN) 12.5 G/50ML
12.5 SOLUTION INTRAVENOUS AS NEEDED
Status: DISPENSED | OUTPATIENT
Start: 2021-05-29 | End: 2021-05-30

## 2021-05-29 RX ORDER — ACETAMINOPHEN 160 MG/5ML
650 SOLUTION ORAL EVERY 4 HOURS PRN
Status: DISCONTINUED | OUTPATIENT
Start: 2021-05-29 | End: 2021-06-09 | Stop reason: HOSPADM

## 2021-05-29 RX ORDER — HEPARIN SODIUM 5000 [USP'U]/ML
5000 INJECTION, SOLUTION INTRAVENOUS; SUBCUTANEOUS EVERY 12 HOURS SCHEDULED
Status: DISCONTINUED | OUTPATIENT
Start: 2021-05-29 | End: 2021-05-30

## 2021-05-29 RX ORDER — SODIUM CHLORIDE 0.9 % (FLUSH) 0.9 %
10 SYRINGE (ML) INJECTION EVERY 12 HOURS SCHEDULED
Status: DISCONTINUED | OUTPATIENT
Start: 2021-05-29 | End: 2021-06-09 | Stop reason: HOSPADM

## 2021-05-29 RX ORDER — NITROGLYCERIN 0.4 MG/1
0.4 TABLET SUBLINGUAL
Status: DISCONTINUED | OUTPATIENT
Start: 2021-05-29 | End: 2021-06-09 | Stop reason: HOSPADM

## 2021-05-29 RX ORDER — ACETAMINOPHEN 650 MG/1
650 SUPPOSITORY RECTAL EVERY 4 HOURS PRN
Status: DISCONTINUED | OUTPATIENT
Start: 2021-05-29 | End: 2021-06-09 | Stop reason: HOSPADM

## 2021-05-29 RX ORDER — ONDANSETRON 4 MG/1
4 TABLET, FILM COATED ORAL EVERY 6 HOURS PRN
Status: DISCONTINUED | OUTPATIENT
Start: 2021-05-29 | End: 2021-06-09 | Stop reason: HOSPADM

## 2021-05-29 RX ORDER — ACETAMINOPHEN 325 MG/1
650 TABLET ORAL EVERY 4 HOURS PRN
Status: DISCONTINUED | OUTPATIENT
Start: 2021-05-29 | End: 2021-06-09 | Stop reason: HOSPADM

## 2021-05-29 RX ORDER — SODIUM CHLORIDE 0.9 % (FLUSH) 0.9 %
10 SYRINGE (ML) INJECTION AS NEEDED
Status: DISCONTINUED | OUTPATIENT
Start: 2021-05-29 | End: 2021-06-09 | Stop reason: HOSPADM

## 2021-05-29 RX ORDER — CHOLECALCIFEROL (VITAMIN D3) 125 MCG
5 CAPSULE ORAL NIGHTLY PRN
Status: DISCONTINUED | OUTPATIENT
Start: 2021-05-29 | End: 2021-06-09 | Stop reason: HOSPADM

## 2021-05-29 RX ADMIN — HEPARIN SODIUM 5000 UNITS: 5000 INJECTION INTRAVENOUS; SUBCUTANEOUS at 20:51

## 2021-05-29 RX ADMIN — Medication 10 ML: at 20:51

## 2021-05-29 RX ADMIN — SODIUM CHLORIDE 250 ML: 9 INJECTION, SOLUTION INTRAVENOUS at 13:43

## 2021-05-30 PROBLEM — I10 ESSENTIAL HYPERTENSION: Chronic | Status: ACTIVE | Noted: 2021-05-30

## 2021-05-30 PROBLEM — I10 ESSENTIAL HYPERTENSION: Status: RESOLVED | Noted: 2021-05-30 | Resolved: 2021-05-30

## 2021-05-30 PROBLEM — I10 ESSENTIAL HYPERTENSION: Status: ACTIVE | Noted: 2021-05-30

## 2021-05-30 PROBLEM — D63.8 ANEMIA, CHRONIC DISEASE: Status: ACTIVE | Noted: 2021-05-30

## 2021-05-30 LAB
ANION GAP SERPL CALCULATED.3IONS-SCNC: 14 MMOL/L (ref 5–15)
APTT PPP: 25.2 SECONDS (ref 24–31)
BASOPHILS # BLD AUTO: 0.1 10*3/MM3 (ref 0–0.2)
BASOPHILS NFR BLD AUTO: 1.4 % (ref 0–1.5)
BUN SERPL-MCNC: 28 MG/DL (ref 6–20)
BUN/CREAT SERPL: 3.3 (ref 7–25)
CALCIUM SPEC-SCNC: 7.7 MG/DL (ref 8.6–10.5)
CHLORIDE SERPL-SCNC: 95 MMOL/L (ref 98–107)
CO2 SERPL-SCNC: 24 MMOL/L (ref 22–29)
CREAT SERPL-MCNC: 8.38 MG/DL (ref 0.76–1.27)
DEPRECATED RDW RBC AUTO: 49.4 FL (ref 37–54)
EOSINOPHIL # BLD AUTO: 0.4 10*3/MM3 (ref 0–0.4)
EOSINOPHIL NFR BLD AUTO: 4.7 % (ref 0.3–6.2)
ERYTHROCYTE [DISTWIDTH] IN BLOOD BY AUTOMATED COUNT: 15 % (ref 12.3–15.4)
GFR SERPL CREATININE-BSD FRML MDRD: 7 ML/MIN/1.73
GFR SERPL CREATININE-BSD FRML MDRD: 8 ML/MIN/1.73
GLUCOSE SERPL-MCNC: 98 MG/DL (ref 65–99)
HCT VFR BLD AUTO: 29.4 % (ref 37.5–51)
HGB BLD-MCNC: 9.9 G/DL (ref 13–17.7)
INR PPP: 0.97 (ref 0.93–1.1)
LYMPHOCYTES # BLD AUTO: 1.1 10*3/MM3 (ref 0.7–3.1)
LYMPHOCYTES NFR BLD AUTO: 14.4 % (ref 19.6–45.3)
MAGNESIUM SERPL-MCNC: 2 MG/DL (ref 1.6–2.6)
MCH RBC QN AUTO: 31.4 PG (ref 26.6–33)
MCHC RBC AUTO-ENTMCNC: 33.6 G/DL (ref 31.5–35.7)
MCV RBC AUTO: 93.4 FL (ref 79–97)
MONOCYTES # BLD AUTO: 0.8 10*3/MM3 (ref 0.1–0.9)
MONOCYTES NFR BLD AUTO: 9.9 % (ref 5–12)
NEUTROPHILS NFR BLD AUTO: 5.5 10*3/MM3 (ref 1.7–7)
NEUTROPHILS NFR BLD AUTO: 69.6 % (ref 42.7–76)
NRBC BLD AUTO-RTO: 0.1 /100 WBC (ref 0–0.2)
PLATELET # BLD AUTO: 92 10*3/MM3 (ref 140–450)
PMV BLD AUTO: 8.3 FL (ref 6–12)
POTASSIUM SERPL-SCNC: 4.5 MMOL/L (ref 3.5–5.2)
PROTHROMBIN TIME: 10.7 SECONDS (ref 9.6–11.7)
QT INTERVAL: 413 MS
RBC # BLD AUTO: 3.15 10*6/MM3 (ref 4.14–5.8)
SODIUM SERPL-SCNC: 133 MMOL/L (ref 136–145)
TROPONIN T SERPL-MCNC: 0.04 NG/ML (ref 0–0.03)
VIT B12 BLD-MCNC: 424 PG/ML (ref 211–946)
WBC # BLD AUTO: 7.8 10*3/MM3 (ref 3.4–10.8)

## 2021-05-30 PROCEDURE — 99223 1ST HOSP IP/OBS HIGH 75: CPT | Performed by: INTERNAL MEDICINE

## 2021-05-30 PROCEDURE — G0378 HOSPITAL OBSERVATION PER HR: HCPCS

## 2021-05-30 PROCEDURE — 25010000002 ONDANSETRON PER 1 MG: Performed by: PHYSICIAN ASSISTANT

## 2021-05-30 PROCEDURE — 25010000002 HEPARIN (PORCINE) PER 1000 UNITS: Performed by: PHYSICIAN ASSISTANT

## 2021-05-30 PROCEDURE — 85610 PROTHROMBIN TIME: CPT | Performed by: PHYSICIAN ASSISTANT

## 2021-05-30 PROCEDURE — 85730 THROMBOPLASTIN TIME PARTIAL: CPT | Performed by: PHYSICIAN ASSISTANT

## 2021-05-30 PROCEDURE — 25010000002 ALBUMIN HUMAN 25% PER 50 ML: Performed by: INTERNAL MEDICINE

## 2021-05-30 PROCEDURE — 5A1D70Z PERFORMANCE OF URINARY FILTRATION, INTERMITTENT, LESS THAN 6 HOURS PER DAY: ICD-10-PCS | Performed by: INTERNAL MEDICINE

## 2021-05-30 PROCEDURE — P9047 ALBUMIN (HUMAN), 25%, 50ML: HCPCS | Performed by: INTERNAL MEDICINE

## 2021-05-30 PROCEDURE — 84484 ASSAY OF TROPONIN QUANT: CPT | Performed by: PHYSICIAN ASSISTANT

## 2021-05-30 PROCEDURE — 83735 ASSAY OF MAGNESIUM: CPT | Performed by: PHYSICIAN ASSISTANT

## 2021-05-30 PROCEDURE — 83036 HEMOGLOBIN GLYCOSYLATED A1C: CPT | Performed by: PHYSICIAN ASSISTANT

## 2021-05-30 PROCEDURE — 80048 BASIC METABOLIC PNL TOTAL CA: CPT | Performed by: PHYSICIAN ASSISTANT

## 2021-05-30 PROCEDURE — 85025 COMPLETE CBC W/AUTO DIFF WBC: CPT | Performed by: PHYSICIAN ASSISTANT

## 2021-05-30 RX ORDER — LOSARTAN POTASSIUM 25 MG/1
25 TABLET ORAL DAILY
Status: DISCONTINUED | OUTPATIENT
Start: 2021-05-30 | End: 2021-06-07

## 2021-05-30 RX ORDER — ACETAMINOPHEN 325 MG/1
650 TABLET ORAL EVERY 6 HOURS PRN
Status: DISCONTINUED | OUTPATIENT
Start: 2021-05-30 | End: 2021-06-09 | Stop reason: HOSPADM

## 2021-05-30 RX ORDER — SODIUM BICARBONATE 650 MG/1
650 TABLET ORAL 3 TIMES DAILY
Status: DISCONTINUED | OUTPATIENT
Start: 2021-05-30 | End: 2021-06-09 | Stop reason: HOSPADM

## 2021-05-30 RX ORDER — CLONIDINE HYDROCHLORIDE 0.1 MG/1
0.3 TABLET ORAL 3 TIMES DAILY
Status: DISCONTINUED | OUTPATIENT
Start: 2021-05-30 | End: 2021-06-04

## 2021-05-30 RX ORDER — SODIUM BICARBONATE 650 MG/1
650 TABLET ORAL 3 TIMES DAILY
Status: DISCONTINUED | OUTPATIENT
Start: 2021-05-30 | End: 2021-05-30

## 2021-05-30 RX ORDER — CALCITRIOL 0.25 UG/1
0.25 CAPSULE, LIQUID FILLED ORAL DAILY
Status: DISCONTINUED | OUTPATIENT
Start: 2021-05-30 | End: 2021-06-09 | Stop reason: HOSPADM

## 2021-05-30 RX ADMIN — ONDANSETRON 4 MG: 2 INJECTION INTRAMUSCULAR; INTRAVENOUS at 21:57

## 2021-05-30 RX ADMIN — CLONIDINE HYDROCHLORIDE 0.3 MG: 0.1 TABLET ORAL at 08:46

## 2021-05-30 RX ADMIN — LOSARTAN POTASSIUM 25 MG: 25 TABLET, FILM COATED ORAL at 08:46

## 2021-05-30 RX ADMIN — Medication 10 ML: at 21:26

## 2021-05-30 RX ADMIN — CLONIDINE HYDROCHLORIDE 0.3 MG: 0.1 TABLET ORAL at 16:14

## 2021-05-30 RX ADMIN — Medication 10 ML: at 08:46

## 2021-05-30 RX ADMIN — CALCITRIOL CAPSULES 0.25 MCG 0.25 MCG: 0.25 CAPSULE ORAL at 14:33

## 2021-05-30 RX ADMIN — ALBUMIN HUMAN 12.5 G: 0.25 SOLUTION INTRAVENOUS at 20:23

## 2021-05-30 RX ADMIN — SODIUM BICARBONATE 650 MG: 650 TABLET ORAL at 21:26

## 2021-05-30 RX ADMIN — HEPARIN SODIUM 5000 UNITS: 5000 INJECTION INTRAVENOUS; SUBCUTANEOUS at 08:46

## 2021-05-30 RX ADMIN — SODIUM BICARBONATE 650 MG TABLET 650 MG: at 08:46

## 2021-05-30 RX ADMIN — SODIUM BICARBONATE 650 MG: 650 TABLET ORAL at 16:14

## 2021-05-31 LAB
ANION GAP SERPL CALCULATED.3IONS-SCNC: 11 MMOL/L (ref 5–15)
ANISOCYTOSIS BLD QL: NORMAL
BASOPHILS # BLD AUTO: 0.1 10*3/MM3 (ref 0–0.2)
BASOPHILS NFR BLD AUTO: 0.8 % (ref 0–1.5)
BUN SERPL-MCNC: 17 MG/DL (ref 6–20)
BUN/CREAT SERPL: 3.1 (ref 7–25)
CALCIUM SPEC-SCNC: 8.4 MG/DL (ref 8.6–10.5)
CHLORIDE SERPL-SCNC: 93 MMOL/L (ref 98–107)
CO2 SERPL-SCNC: 28 MMOL/L (ref 22–29)
CREAT SERPL-MCNC: 5.4 MG/DL (ref 0.76–1.27)
DEPRECATED RDW RBC AUTO: 47.7 FL (ref 37–54)
EOSINOPHIL # BLD AUTO: 0.8 10*3/MM3 (ref 0–0.4)
EOSINOPHIL NFR BLD AUTO: 12.5 % (ref 0.3–6.2)
ERYTHROCYTE [DISTWIDTH] IN BLOOD BY AUTOMATED COUNT: 14.8 % (ref 12.3–15.4)
FERRITIN SERPL-MCNC: 165.5 NG/ML (ref 30–400)
FOLATE SERPL-MCNC: 4.34 NG/ML (ref 4.78–24.2)
GFR SERPL CREATININE-BSD FRML MDRD: 11 ML/MIN/1.73
GFR SERPL CREATININE-BSD FRML MDRD: 14 ML/MIN/1.73
GLUCOSE SERPL-MCNC: 92 MG/DL (ref 65–99)
HAPTOGLOB SERPL-MCNC: 122 MG/DL (ref 30–200)
HCT VFR BLD AUTO: 31 % (ref 37.5–51)
HGB BLD-MCNC: 10.4 G/DL (ref 13–17.7)
IRON 24H UR-MRATE: 68 MCG/DL (ref 59–158)
IRON SATN MFR SERPL: 21 % (ref 20–50)
LARGE PLATELETS: NORMAL
LYMPHOCYTES # BLD AUTO: 1.3 10*3/MM3 (ref 0.7–3.1)
LYMPHOCYTES NFR BLD AUTO: 20.7 % (ref 19.6–45.3)
MAGNESIUM SERPL-MCNC: 1.8 MG/DL (ref 1.6–2.6)
MCH RBC QN AUTO: 31.6 PG (ref 26.6–33)
MCHC RBC AUTO-ENTMCNC: 33.5 G/DL (ref 31.5–35.7)
MCV RBC AUTO: 94.1 FL (ref 79–97)
MONOCYTES # BLD AUTO: 0.5 10*3/MM3 (ref 0.1–0.9)
MONOCYTES NFR BLD AUTO: 7.6 % (ref 5–12)
NEUTROPHILS NFR BLD AUTO: 3.8 10*3/MM3 (ref 1.7–7)
NEUTROPHILS NFR BLD AUTO: 58.4 % (ref 42.7–76)
NRBC BLD AUTO-RTO: 0 /100 WBC (ref 0–0.2)
PLATELET # BLD AUTO: 33 10*3/MM3 (ref 140–450)
PMV BLD AUTO: 8.2 FL (ref 6–12)
POTASSIUM SERPL-SCNC: 3.9 MMOL/L (ref 3.5–5.2)
RBC # BLD AUTO: 3.29 10*6/MM3 (ref 4.14–5.8)
RETICS # AUTO: 0.02 10*6/MM3 (ref 0.02–0.13)
RETICS/RBC NFR AUTO: 0.55 % (ref 0.7–1.9)
SMALL PLATELETS BLD QL SMEAR: NORMAL
SODIUM SERPL-SCNC: 132 MMOL/L (ref 136–145)
TIBC SERPL-MCNC: 320 MCG/DL (ref 298–536)
TRANSFERRIN SERPL-MCNC: 215 MG/DL (ref 200–360)
WBC # BLD AUTO: 6.5 10*3/MM3 (ref 3.4–10.8)
WBC MORPH BLD: NORMAL

## 2021-05-31 PROCEDURE — 80048 BASIC METABOLIC PNL TOTAL CA: CPT | Performed by: PHYSICIAN ASSISTANT

## 2021-05-31 PROCEDURE — 85007 BL SMEAR W/DIFF WBC COUNT: CPT | Performed by: PHYSICIAN ASSISTANT

## 2021-05-31 PROCEDURE — 25010000002 IRON SUCROSE PER 1 MG: Performed by: INTERNAL MEDICINE

## 2021-05-31 PROCEDURE — 82728 ASSAY OF FERRITIN: CPT | Performed by: NURSE PRACTITIONER

## 2021-05-31 PROCEDURE — 85025 COMPLETE CBC W/AUTO DIFF WBC: CPT | Performed by: PHYSICIAN ASSISTANT

## 2021-05-31 PROCEDURE — 86022 PLATELET ANTIBODIES: CPT | Performed by: NURSE PRACTITIONER

## 2021-05-31 PROCEDURE — 83540 ASSAY OF IRON: CPT | Performed by: NURSE PRACTITIONER

## 2021-05-31 PROCEDURE — 85045 AUTOMATED RETICULOCYTE COUNT: CPT | Performed by: NURSE PRACTITIONER

## 2021-05-31 PROCEDURE — 99226 PR SBSQ OBSERVATION CARE/DAY 35 MINUTES: CPT | Performed by: INTERNAL MEDICINE

## 2021-05-31 PROCEDURE — 82668 ASSAY OF ERYTHROPOIETIN: CPT | Performed by: NURSE PRACTITIONER

## 2021-05-31 PROCEDURE — 86645 CMV ANTIBODY IGM: CPT | Performed by: NURSE PRACTITIONER

## 2021-05-31 PROCEDURE — 86665 EPSTEIN-BARR CAPSID VCA: CPT | Performed by: NURSE PRACTITIONER

## 2021-05-31 PROCEDURE — 83010 ASSAY OF HAPTOGLOBIN QUANT: CPT | Performed by: NURSE PRACTITIONER

## 2021-05-31 PROCEDURE — 83735 ASSAY OF MAGNESIUM: CPT | Performed by: PHYSICIAN ASSISTANT

## 2021-05-31 PROCEDURE — 82525 ASSAY OF COPPER: CPT | Performed by: NURSE PRACTITIONER

## 2021-05-31 PROCEDURE — 63710000001 PREDNISONE PER 5 MG: Performed by: NURSE PRACTITIONER

## 2021-05-31 PROCEDURE — G0378 HOSPITAL OBSERVATION PER HR: HCPCS

## 2021-05-31 PROCEDURE — 84466 ASSAY OF TRANSFERRIN: CPT | Performed by: NURSE PRACTITIONER

## 2021-05-31 RX ORDER — ALBUMIN (HUMAN) 12.5 G/50ML
12.5 SOLUTION INTRAVENOUS AS NEEDED
Status: ACTIVE | OUTPATIENT
Start: 2021-05-31 | End: 2021-06-01

## 2021-05-31 RX ORDER — FAMOTIDINE 20 MG/1
20 TABLET, FILM COATED ORAL
Status: DISCONTINUED | OUTPATIENT
Start: 2021-05-31 | End: 2021-06-03

## 2021-05-31 RX ORDER — ALBUMIN (HUMAN) 12.5 G/50ML
12.5 SOLUTION INTRAVENOUS AS NEEDED
Status: DISCONTINUED | OUTPATIENT
Start: 2021-05-31 | End: 2021-05-31 | Stop reason: SDUPTHER

## 2021-05-31 RX ADMIN — CALCITRIOL CAPSULES 0.25 MCG 0.25 MCG: 0.25 CAPSULE ORAL at 10:15

## 2021-05-31 RX ADMIN — SODIUM BICARBONATE 650 MG: 650 TABLET ORAL at 16:31

## 2021-05-31 RX ADMIN — PREDNISONE 60 MG: 10 TABLET ORAL at 13:42

## 2021-05-31 RX ADMIN — CLONIDINE HYDROCHLORIDE 0.3 MG: 0.1 TABLET ORAL at 08:53

## 2021-05-31 RX ADMIN — FAMOTIDINE 20 MG: 20 TABLET ORAL at 13:42

## 2021-05-31 RX ADMIN — CLONIDINE HYDROCHLORIDE 0.3 MG: 0.1 TABLET ORAL at 20:53

## 2021-05-31 RX ADMIN — IRON SUCROSE 200 MG: 20 INJECTION, SOLUTION INTRAVENOUS at 16:31

## 2021-05-31 RX ADMIN — CLONIDINE HYDROCHLORIDE 0.3 MG: 0.1 TABLET ORAL at 16:31

## 2021-05-31 RX ADMIN — FAMOTIDINE 20 MG: 20 TABLET ORAL at 16:31

## 2021-05-31 RX ADMIN — Medication 10 ML: at 08:53

## 2021-05-31 RX ADMIN — SODIUM BICARBONATE 650 MG: 650 TABLET ORAL at 08:53

## 2021-05-31 RX ADMIN — SODIUM BICARBONATE 650 MG: 650 TABLET ORAL at 20:53

## 2021-05-31 RX ADMIN — Medication 10 ML: at 20:54

## 2021-05-31 RX ADMIN — LOSARTAN POTASSIUM 25 MG: 25 TABLET, FILM COATED ORAL at 08:53

## 2021-06-01 LAB
ANION GAP SERPL CALCULATED.3IONS-SCNC: 15 MMOL/L (ref 5–15)
BASOPHILS # BLD AUTO: 0 10*3/MM3 (ref 0–0.2)
BASOPHILS NFR BLD AUTO: 0.3 % (ref 0–1.5)
BUN SERPL-MCNC: 36 MG/DL (ref 6–20)
BUN/CREAT SERPL: 4.5 (ref 7–25)
CALCIUM SPEC-SCNC: 8.7 MG/DL (ref 8.6–10.5)
CHLORIDE SERPL-SCNC: 94 MMOL/L (ref 98–107)
CO2 SERPL-SCNC: 24 MMOL/L (ref 22–29)
CREAT SERPL-MCNC: 7.92 MG/DL (ref 0.76–1.27)
DEPRECATED RDW RBC AUTO: 46.8 FL (ref 37–54)
EBV VCA IGM SER IA-ACNC: <36 U/ML (ref 0–35.9)
EOSINOPHIL # BLD AUTO: 0 10*3/MM3 (ref 0–0.4)
EOSINOPHIL NFR BLD AUTO: 0.1 % (ref 0.3–6.2)
EPO SERPL-ACNC: 9.4 MIU/ML (ref 2.6–18.5)
ERYTHROCYTE [DISTWIDTH] IN BLOOD BY AUTOMATED COUNT: 14.8 % (ref 12.3–15.4)
GFR SERPL CREATININE-BSD FRML MDRD: 7 ML/MIN/1.73
GFR SERPL CREATININE-BSD FRML MDRD: 9 ML/MIN/1.73
GLUCOSE SERPL-MCNC: 129 MG/DL (ref 65–99)
HBA1C MFR BLD: 5 % (ref 3.5–5.6)
HCT VFR BLD AUTO: 31.2 % (ref 37.5–51)
HGB BLD-MCNC: 10.6 G/DL (ref 13–17.7)
LYMPHOCYTES # BLD AUTO: 0.9 10*3/MM3 (ref 0.7–3.1)
LYMPHOCYTES NFR BLD AUTO: 12 % (ref 19.6–45.3)
MAGNESIUM SERPL-MCNC: 2.2 MG/DL (ref 1.6–2.6)
MCH RBC QN AUTO: 31.7 PG (ref 26.6–33)
MCHC RBC AUTO-ENTMCNC: 33.8 G/DL (ref 31.5–35.7)
MCV RBC AUTO: 93.7 FL (ref 79–97)
MONOCYTES # BLD AUTO: 0.7 10*3/MM3 (ref 0.1–0.9)
MONOCYTES NFR BLD AUTO: 9.3 % (ref 5–12)
NEUTROPHILS NFR BLD AUTO: 6.1 10*3/MM3 (ref 1.7–7)
NEUTROPHILS NFR BLD AUTO: 78.3 % (ref 42.7–76)
NRBC BLD AUTO-RTO: 0 /100 WBC (ref 0–0.2)
PLATELET # BLD AUTO: 22 10*3/MM3 (ref 140–450)
PLATELET # BLD AUTO: 56 10*3/MM3 (ref 140–450)
PMV BLD AUTO: 10.9 FL (ref 6–12)
POTASSIUM SERPL-SCNC: 4.6 MMOL/L (ref 3.5–5.2)
RBC # BLD AUTO: 3.33 10*6/MM3 (ref 4.14–5.8)
SODIUM SERPL-SCNC: 133 MMOL/L (ref 136–145)
WBC # BLD AUTO: 7.8 10*3/MM3 (ref 3.4–10.8)

## 2021-06-01 PROCEDURE — 83735 ASSAY OF MAGNESIUM: CPT | Performed by: PHYSICIAN ASSISTANT

## 2021-06-01 PROCEDURE — 99233 SBSQ HOSP IP/OBS HIGH 50: CPT | Performed by: INTERNAL MEDICINE

## 2021-06-01 PROCEDURE — 85025 COMPLETE CBC W/AUTO DIFF WBC: CPT | Performed by: PHYSICIAN ASSISTANT

## 2021-06-01 PROCEDURE — 25010000002 EPOETIN ALFA-EPBX 10000 UNIT/ML SOLUTION: Performed by: INTERNAL MEDICINE

## 2021-06-01 PROCEDURE — 63710000001 PREDNISONE PER 5 MG: Performed by: NURSE PRACTITIONER

## 2021-06-01 PROCEDURE — 80048 BASIC METABOLIC PNL TOTAL CA: CPT | Performed by: PHYSICIAN ASSISTANT

## 2021-06-01 PROCEDURE — 84155 ASSAY OF PROTEIN SERUM: CPT | Performed by: NURSE PRACTITIONER

## 2021-06-01 PROCEDURE — 25010000002 HEPARIN (PORCINE) PER 1000 UNITS: Performed by: INTERNAL MEDICINE

## 2021-06-01 PROCEDURE — 84165 PROTEIN E-PHORESIS SERUM: CPT | Performed by: NURSE PRACTITIONER

## 2021-06-01 PROCEDURE — 85049 AUTOMATED PLATELET COUNT: CPT | Performed by: INTERNAL MEDICINE

## 2021-06-01 PROCEDURE — 5A1D70Z PERFORMANCE OF URINARY FILTRATION, INTERMITTENT, LESS THAN 6 HOURS PER DAY: ICD-10-PCS | Performed by: INTERNAL MEDICINE

## 2021-06-01 RX ORDER — SEVELAMER CARBONATE 800 MG/1
800 TABLET, FILM COATED ORAL
Status: DISCONTINUED | OUTPATIENT
Start: 2021-06-01 | End: 2021-06-09 | Stop reason: HOSPADM

## 2021-06-01 RX ORDER — FOLIC ACID 1 MG/1
1 TABLET ORAL DAILY
Status: DISCONTINUED | OUTPATIENT
Start: 2021-06-01 | End: 2021-06-09 | Stop reason: HOSPADM

## 2021-06-01 RX ORDER — HEPARIN SODIUM 1000 [USP'U]/ML
4500 INJECTION, SOLUTION INTRAVENOUS; SUBCUTANEOUS AS NEEDED
Status: DISCONTINUED | OUTPATIENT
Start: 2021-06-01 | End: 2021-06-02

## 2021-06-01 RX ORDER — LABETALOL HYDROCHLORIDE 5 MG/ML
25 INJECTION, SOLUTION INTRAVENOUS EVERY 4 HOURS PRN
Status: DISCONTINUED | OUTPATIENT
Start: 2021-06-01 | End: 2021-06-09 | Stop reason: HOSPADM

## 2021-06-01 RX ADMIN — SEVELAMER CARBONATE 800 MG: 800 TABLET, FILM COATED ORAL at 12:49

## 2021-06-01 RX ADMIN — HEPARIN SODIUM 4500 UNITS: 1000 INJECTION INTRAVENOUS; SUBCUTANEOUS at 09:53

## 2021-06-01 RX ADMIN — FOLIC ACID 1 MG: 1 TABLET ORAL at 12:49

## 2021-06-01 RX ADMIN — CALCITRIOL CAPSULES 0.25 MCG 0.25 MCG: 0.25 CAPSULE ORAL at 08:11

## 2021-06-01 RX ADMIN — CLONIDINE HYDROCHLORIDE 0.3 MG: 0.1 TABLET ORAL at 17:06

## 2021-06-01 RX ADMIN — Medication 10 ML: at 20:18

## 2021-06-01 RX ADMIN — SODIUM BICARBONATE 650 MG: 650 TABLET ORAL at 20:17

## 2021-06-01 RX ADMIN — FAMOTIDINE 20 MG: 20 TABLET ORAL at 08:11

## 2021-06-01 RX ADMIN — SODIUM BICARBONATE 650 MG: 650 TABLET ORAL at 08:11

## 2021-06-01 RX ADMIN — EPOETIN ALFA-EPBX 10000 UNITS: 10000 INJECTION, SOLUTION INTRAVENOUS; SUBCUTANEOUS at 09:53

## 2021-06-01 RX ADMIN — FAMOTIDINE 20 MG: 20 TABLET ORAL at 17:06

## 2021-06-01 RX ADMIN — PREDNISONE 60 MG: 10 TABLET ORAL at 08:11

## 2021-06-01 RX ADMIN — SEVELAMER CARBONATE 800 MG: 800 TABLET, FILM COATED ORAL at 17:06

## 2021-06-01 RX ADMIN — CLONIDINE HYDROCHLORIDE 0.3 MG: 0.1 TABLET ORAL at 04:56

## 2021-06-01 RX ADMIN — LABETALOL 20 MG/4 ML (5 MG/ML) INTRAVENOUS SYRINGE 25 MG: at 06:26

## 2021-06-01 RX ADMIN — LOSARTAN POTASSIUM 25 MG: 25 TABLET, FILM COATED ORAL at 04:56

## 2021-06-01 RX ADMIN — CLONIDINE HYDROCHLORIDE 0.3 MG: 0.1 TABLET ORAL at 20:17

## 2021-06-01 RX ADMIN — Medication 10 ML: at 08:11

## 2021-06-01 RX ADMIN — SODIUM BICARBONATE 650 MG: 650 TABLET ORAL at 17:06

## 2021-06-01 NOTE — PLAN OF CARE
Problem: Adult Inpatient Plan of Care  Goal: Plan of Care Review  Outcome: Ongoing, Progressing  Flowsheets (Taken 6/1/2021 0143)  Progress: improving  Plan of Care Reviewed With: patient   Goal Outcome Evaluation:  Plan of Care Reviewed With: patient  Progress: improving   Patient alert and oriented, patient has rested well this shift.

## 2021-06-01 NOTE — PROGRESS NOTES
: Patient feels better denies complaints    Vital Signs  Vitals:    06/01/21 0830   BP: 148/80   Pulse: 77   Resp: 17   Temp: 98 °F (36.7 °C)   SpO2:      I/O this shift:  In: 240 [P.O.:240]  Out: -   I/O last 3 completed shifts:  In: 1550 [P.O.:1500; IV Piggyback:50]  Out: 3000 [Other:3000]      Physical Exam:    General: No acute distress  HEENT:  Normocephalic, Atraumatic, EOMI, PERRLA, NO icterus,  Neck:  Supple, No JVD, No Carotid artery bruit, No lymphadenopathy  Chest: Clear to auscultation bilaterally,   Cardiovascular: Regular rate and rhythm. Positive S1 & S2, No rub, murmur or gallop.  Abdominal: Soft, nontender, no palpable organomegaly, no abdominal bruit, positive bowel sounds  Musculoskeletal: No joint tenderness or swelling, good range of motion, Adequate muscle strength on both sides, no cyanosis, clubbing or edema on lower extremities.  Neuro: Alert oriented x3, CN1 - 12 intact, No focal sensory or motor deficit.      Review of Systems:   CNS: Denied headaches, blurred vision, tingling or numbness in any part of body. No weakness or any impaired speech.  CVS: No chest pain or shortness of breath, No orthopnea or PND or exertional dyspnea,  Pulmonary: Denies shortness of breath, coughs, hematemesis, night sweats or weight loss.  GI: Denies diarrhea, nausea, vomiting. Denies weight loss, hematemesis, melena.  : Denies dysuria, frequency or hesitancy of urination  Vascular: Denies claudication, resting pain, tingling numbness or weakness in any part of the body.  Musculoskeletal: Denies Joint tenderness or pain, denies stiffness in joints, denies fever or weight loss, or skin rashes.    Current Labs  [unfilled]  Lab Results (last 24 hours)     Procedure Component Value Units Date/Time    Hemoglobin A1c [[id number]]  (Normal) Collected: 05/30/21 0922    Specimen: Blood Updated: 06/01/21 0948     Hemoglobin A1C 5.0 %     Narrative:      Hemoglobin A1C Reference Range:    <5.7 %         Normal  5.7-6.4 %     Increased risk for diabetes  > 6.4 %        Diabetes       These guidelines have been recommended by the American Diabetic Association for Hgb A1c.      The following 2010 guidelines have been recommended by the American Diabetes Association for Hemoglobin A1c.    HBA1c 5.7-6.4% Increased risk for future diabetes (pre-diabetes)  HBA1c     >6.4% Diabetes      Basic Metabolic Panel [[id number]]  (Abnormal) Collected: 06/01/21 0516    Specimen: Blood Updated: 06/01/21 0712     Glucose 129 mg/dL      BUN 36 mg/dL      Comment: Result checked         Creatinine 7.92 mg/dL      Sodium 133 mmol/L      Potassium 4.6 mmol/L      Chloride 94 mmol/L      CO2 24.0 mmol/L      Calcium 8.7 mg/dL      eGFR  African Amer 9 mL/min/1.73      Comment: <15 Indicative of kidney failure.        eGFR Non African Amer 7 mL/min/1.73      Comment: <15 Indicative of kidney failure.        BUN/Creatinine Ratio 4.5     Anion Gap 15.0 mmol/L     Narrative:      GFR Normal >60  Chronic Kidney Disease <60  Kidney Failure <15      Magnesium [[id number]]  (Normal) Collected: 06/01/21 0516    Specimen: Blood Updated: 06/01/21 0649     Magnesium 2.2 mg/dL     CBC & Differential [[id number]]  (Abnormal) Collected: 06/01/21 0516    Specimen: Blood Updated: 06/01/21 0624    Narrative:      The following orders were created for panel order CBC & Differential.  Procedure                               Abnormality         Status                     ---------                               -----------         ------                     CBC Auto Differential[[id number]]        Abnormal            Final result                 Please view results for these tests on the individual orders.    CBC Auto Differential [[id number]]  (Abnormal) Collected: 06/01/21 0516    Specimen: Blood Updated: 06/01/21 0624     WBC 7.80 10*3/mm3      RBC 3.33 10*6/mm3      Hemoglobin 10.6 g/dL      Hematocrit 31.2 %      MCV 93.7 fL      MCH 31.7 pg      MCHC 33.8  g/dL      RDW 14.8 %      RDW-SD 46.8 fl      MPV 10.9 fL      Platelets 56 10*3/mm3      Comment: Result checked        Neutrophil % 78.3 %      Lymphocyte % 12.0 %      Monocyte % 9.3 %      Eosinophil % 0.1 %      Basophil % 0.3 %      Neutrophils, Absolute 6.10 10*3/mm3      Lymphocytes, Absolute 0.90 10*3/mm3      Monocytes, Absolute 0.70 10*3/mm3      Eosinophils, Absolute 0.00 10*3/mm3      Basophils, Absolute 0.00 10*3/mm3      nRBC 0.0 /100 WBC     Haptoglobin [[id number]]  (Normal) Collected: 05/31/21 1352    Specimen: Blood Updated: 05/31/21 1758     Haptoglobin 122 mg/dL     Reticulocytes [[id number]]  (Abnormal) Collected: 05/31/21 0856    Specimen: Blood Updated: 05/31/21 1651     Reticulocyte % 0.55 %      Reticulocyte Absolute 0.0180 10*6/mm3         Current Radiology  Imaging Results (Last 24 Hours)     ** No results found for the last 24 hours. **        Current Meds    Current Facility-Administered Medications:   •  acetaminophen (TYLENOL) tablet 650 mg, 650 mg, Oral, Q4H PRN **OR** acetaminophen (TYLENOL) 160 MG/5ML solution 650 mg, 650 mg, Oral, Q4H PRN **OR** acetaminophen (TYLENOL) suppository 650 mg, 650 mg, Rectal, Q4H PRN, Chalo Mann PA-C  •  acetaminophen (TYLENOL) tablet 650 mg, 650 mg, Oral, Q6H PRN, Chalo Mann PA-C  •  albumin human 25 % IV SOLN 12.5 g, 12.5 g, Intravenous, PRN, Kaveh Velazquez MD  •  calcitriol (ROCALTROL) capsule 0.25 mcg, 0.25 mcg, Oral, Daily, Chalo Mnan PA-C, 0.25 mcg at 06/01/21 0811  •  cloNIDine (CATAPRES) tablet 0.3 mg, 0.3 mg, Oral, TID, Chalo Mann PA-C, 0.3 mg at 06/01/21 0456  •  famotidine (PEPCID) tablet 20 mg, 20 mg, Oral, BID AC, Rosalba Cage APRN, 20 mg at 06/01/21 0811  •  folic acid (FOLVITE) tablet 1 mg, 1 mg, Oral, Daily, Natacha Amaya APRN  •  heparin (porcine) injection 4,500 Units, 4,500 Units, Intracatheter, PRN, Kaveh Velazquez MD, 4,500 Units at 06/01/21 0953  •  labetalol (NORMODYNE,TRANDATE)  injection 25 mg, 25 mg, Intravenous, Q4H PRN, Martha Dee MD, 25 mg at 06/01/21 0626  •  losartan (COZAAR) tablet 25 mg, 25 mg, Oral, Daily, Chalo Mann PA-C, 25 mg at 06/01/21 0456  •  melatonin tablet 5 mg, 5 mg, Oral, Nightly PRN, Chalo Mann PA-C  •  nitroglycerin (NITROSTAT) SL tablet 0.4 mg, 0.4 mg, Sublingual, Q5 Min PRN, Chalo Mann PA-C  •  ondansetron (ZOFRAN) tablet 4 mg, 4 mg, Oral, Q6H PRN **OR** ondansetron (ZOFRAN) injection 4 mg, 4 mg, Intravenous, Q6H PRN, Chalo Mann PA-C, 4 mg at 05/30/21 2157  •  predniSONE (DELTASONE) tablet 60 mg, 60 mg, Oral, Daily With Breakfast, Rosalba Cage APRN, 60 mg at 06/01/21 0811  •  sevelamer (RENVELA) tablet 800 mg, 800 mg, Oral, TID With Meals, Benjy Nayak MD  •  sodium bicarbonate tablet 650 mg, 650 mg, Oral, TID, Kaveh Velazquez MD, 650 mg at 06/01/21 0811  •  sodium chloride 0.9 % flush 10 mL, 10 mL, Intravenous, Q12H, Chalo Mann PA-C, 10 mL at 06/01/21 0811  •  sodium chloride 0.9 % flush 10 mL, 10 mL, Intravenous, PRN, Chalo Mann PA-C              Patient Active Problem List   Diagnosis   • ESRD (end stage renal disease) on dialysis (CMS/[facility])   • Chronic kidney disease on chronic dialysis (CMS/[facility])   • Essential hypertension   • Anemia, chronic disease   • Essential hypertension   End-stage renal disease continue with dialysis Monday Wednesday and Friday patient will need to insurance from [place] Medicaid so he can be placed in outpatient dialysis         Kaveh Velazquez MD FACP, FASN.  06/01/21  10:44 EDT

## 2021-06-01 NOTE — PROGRESS NOTES
Hematology/Oncology Inpatient Progress Note    PATIENT NAME: Josh Klein  : 1970  MRN: [id number]    CHIEF COMPLAINT: Acute thrombocytopenia    HISTORY OF PRESENT ILLNESS:    50 y.o. male presented to [facility] ER on 2021 reporting a need for hemodialysis.  He is an undocumented immigrant and has not been able to obtain insurance to receive hemodialysis as an outpatient.  He reported weakness dyspnea on exertion and a low blood pressure for the past 24 hours.  His chest x-ray was unremarkable. WBC 7.2, hemoglobin 10.3, MCV 94.2 and platelets 269,000.  His platelets started declining the following day, and were 33,000 on 2021.  PT 10.7 (9.6-11.7, PTT 25.2 (24-31), vitamin B12 424 (211-946), and LFTs were normal. According to the medical record, he was diagnosed with end-stage renal disease at [facility] on 2021 and was started on hemodialysis.  Since that time he has been unable to obtain insurance coverage for treatment of his renal disease.     21  Hematology/Oncology was consulted by Dr. Nayak for acute thrombocytopenia.  He denied any history of bleeding or low blood counts.     Past Medical History: End-stage renal disease and hypertension  Social History: He lives in [place] with his brother's family.  He has worked in construction.  He does not smoke or use alcohol or other recreational drugs.  Family History: His mother had hypertension.  Allergies: No known allergies.     PCP: Provider, No Known     INTERVAL HISTORY:  • 2021 -White blood count 7.8, hemoglobin 10.6, MCV 93.7, platelets 56,000. Folate 4.34 (4.78-24.20), iron 68 (), iron saturation 21 (20-50) transferrin 215 (200-360), TIBC 320 (298-536), reticulocytes 0.55 (0.7-1.9), haptoglobin 122 (). Started folic acid 1 mg p.o. once daily.  Received Venofer 200 mg IV and Retacrit 30,000 units subcu x1 dose per nephrology on 2021 with hemodialysis.    History of present  illness reviewed since last visit and changes noted on 06/01/21.    Subjective     He is not having any bleeding.    ROS:  Review of Systems   Constitutional: Negative for activity change, chills, fatigue, fever and unexpected weight change.   HENT: Negative for congestion, dental problem, hearing loss, mouth sores, nosebleeds, sore throat and trouble swallowing.    Eyes: Negative for photophobia and visual disturbance.   Respiratory: Negative for cough, chest tightness and shortness of breath.    Cardiovascular: Negative for chest pain, palpitations and leg swelling.   Gastrointestinal: Negative for abdominal distention, abdominal pain, blood in stool, constipation, diarrhea, nausea and vomiting.   Endocrine: Negative for cold intolerance and heat intolerance.   Genitourinary: Negative for decreased urine volume, difficulty urinating, dysuria, frequency, hematuria and urgency.   Musculoskeletal: Negative for arthralgias and gait problem.   Skin: Negative for rash and wound.   Neurological: Negative for dizziness, tremors, weakness, light-headedness, numbness and headaches.   Hematological: Negative for adenopathy. Does not bruise/bleed easily.        No bleeding.   Psychiatric/Behavioral: Negative for confusion and hallucinations. The patient is not nervous/anxious.    All other systems reviewed and are negative.     MEDICATIONS:    Scheduled Meds:  calcitriol, 0.25 mcg, Oral, Daily  cloNIDine, 0.3 mg, Oral, TID  famotidine, 20 mg, Oral, BID AC  losartan, 25 mg, Oral, Daily  predniSONE, 60 mg, Oral, Daily With Breakfast  sevelamer, 800 mg, Oral, TID With Meals  sodium bicarbonate, 650 mg, Oral, TID  sodium chloride, 10 mL, Intravenous, Q12H       Continuous Infusions:      PRN Meds:  •  acetaminophen **OR** acetaminophen **OR** acetaminophen  •  acetaminophen  •  albumin human  •  heparin (porcine)  •  labetalol  •  melatonin  •  nitroglycerin  •  ondansetron **OR** ondansetron  •  sodium chloride     ALLERGIES:   "No Known Allergies    Objective    VITALS:   /80   Pulse 77   Temp 98 °F (36.7 °C)   Resp 17   Ht 162.6 cm (64\")   Wt 59.1 kg (130 lb 3.2 oz)   SpO2 95%   BMI 22.35 kg/m²     PHYSICAL EXAM:  Physical Exam  Vitals and nursing note reviewed.   Constitutional:       General: He is not in acute distress.     Appearance: Normal appearance. He is well-developed. He is not diaphoretic.   HENT:      Head: Normocephalic and atraumatic.      Right Ear: External ear normal.      Left Ear: External ear normal.      Nose: Nose normal.      Mouth/Throat:      Mouth: Mucous membranes are moist.      Dentition: Abnormal dentition (Dental fillings).      Pharynx: Oropharynx is clear. No oropharyngeal exudate or posterior oropharyngeal erythema.   Eyes:      General: No scleral icterus.     Extraocular Movements: Extraocular movements intact.      Conjunctiva/sclera: Conjunctivae normal.      Pupils: Pupils are equal, round, and reactive to light.      Comments: Wears corrective lenses.    Cardiovascular:      Rate and Rhythm: Normal rate and regular rhythm.      Heart sounds: Normal heart sounds. No murmur heard.        Comments: Cardiac monitor leads.   Pulmonary:      Effort: Pulmonary effort is normal. No respiratory distress.      Breath sounds: Normal breath sounds. No wheezing or rales.   Chest:      Comments: Shiley catheter right chest wall.   Abdominal:      General: Bowel sounds are normal. There is no distension.      Palpations: Abdomen is soft. There is no mass.      Tenderness: There is no abdominal tenderness. There is no guarding.   Genitourinary:     Comments: Deferred   Musculoskeletal:         General: No swelling, tenderness or deformity. Normal range of motion.      Cervical back: Normal range of motion and neck supple.      Right lower leg: No edema.      Left lower leg: No edema.      Comments: Left upper extremity peripheral IV.   Lymphadenopathy:      Cervical: No cervical adenopathy.      Upper " Body:      Right upper body: No supraclavicular adenopathy.      Left upper body: No supraclavicular adenopathy.   Skin:     General: Skin is warm and dry.      Coloration: Skin is not pale.      Findings: No bruising, erythema or rash.   Neurological:      General: No focal deficit present.      Mental Status: He is alert and oriented to person, place, and time.      Coordination: Coordination normal.   Psychiatric:         Mood and Affect: Mood normal.         Behavior: Behavior normal.         Thought Content: Thought content normal.         RECENT LABS:  Lab Results (last 24 hours)     Procedure Component Value Units Date/Time    Hemoglobin A1c [[id number]]  (Normal) Collected: 05/30/21 0922    Specimen: Blood Updated: 06/01/21 0948     Hemoglobin A1C 5.0 %     Narrative:      Hemoglobin A1C Reference Range:    <5.7 %        Normal  5.7-6.4 %     Increased risk for diabetes  > 6.4 %        Diabetes       These guidelines have been recommended by the American Diabetic Association for Hgb A1c.      The following 2010 guidelines have been recommended by the American Diabetes Association for Hemoglobin A1c.    HBA1c 5.7-6.4% Increased risk for future diabetes (pre-diabetes)  HBA1c     >6.4% Diabetes      Basic Metabolic Panel [[id number]]  (Abnormal) Collected: 06/01/21 0516    Specimen: Blood Updated: 06/01/21 0712     Glucose 129 mg/dL      BUN 36 mg/dL      Comment: Result checked         Creatinine 7.92 mg/dL      Sodium 133 mmol/L      Potassium 4.6 mmol/L      Chloride 94 mmol/L      CO2 24.0 mmol/L      Calcium 8.7 mg/dL      eGFR  African Amer 9 mL/min/1.73      Comment: <15 Indicative of kidney failure.        eGFR Non African Amer 7 mL/min/1.73      Comment: <15 Indicative of kidney failure.        BUN/Creatinine Ratio 4.5     Anion Gap 15.0 mmol/L     Narrative:      GFR Normal >60  Chronic Kidney Disease <60  Kidney Failure <15      Magnesium [[id number]]  (Normal) Collected: 06/01/21 0516    Specimen:  Blood Updated: 06/01/21 0649     Magnesium 2.2 mg/dL     CBC & Differential [[id number]]  (Abnormal) Collected: 06/01/21 0516    Specimen: Blood Updated: 06/01/21 0624    Narrative:      The following orders were created for panel order CBC & Differential.  Procedure                               Abnormality         Status                     ---------                               -----------         ------                     CBC Auto Differential[[id number]]        Abnormal            Final result                 Please view results for these tests on the individual orders.    CBC Auto Differential [[id number]]  (Abnormal) Collected: 06/01/21 0516    Specimen: Blood Updated: 06/01/21 0624     WBC 7.80 10*3/mm3      RBC 3.33 10*6/mm3      Hemoglobin 10.6 g/dL      Hematocrit 31.2 %      MCV 93.7 fL      MCH 31.7 pg      MCHC 33.8 g/dL      RDW 14.8 %      RDW-SD 46.8 fl      MPV 10.9 fL      Platelets 56 10*3/mm3      Comment: Result checked        Neutrophil % 78.3 %      Lymphocyte % 12.0 %      Monocyte % 9.3 %      Eosinophil % 0.1 %      Basophil % 0.3 %      Neutrophils, Absolute 6.10 10*3/mm3      Lymphocytes, Absolute 0.90 10*3/mm3      Monocytes, Absolute 0.70 10*3/mm3      Eosinophils, Absolute 0.00 10*3/mm3      Basophils, Absolute 0.00 10*3/mm3      nRBC 0.0 /100 WBC     Haptoglobin [[id number]]  (Normal) Collected: 05/31/21 1352    Specimen: Blood Updated: 05/31/21 1758     Haptoglobin 122 mg/dL     Reticulocytes [[id number]]  (Abnormal) Collected: 05/31/21 0856    Specimen: Blood Updated: 05/31/21 1651     Reticulocyte % 0.55 %      Reticulocyte Absolute 0.0180 10*6/mm3     Heparin-induced Platelet Antibody [[id number]] Collected: 05/31/21 1352    Specimen: Blood Updated: 05/31/21 1443    Cytomegalovirus Antibody, IgM [[id number]] Collected: 05/31/21 1352    Specimen: Blood Updated: 05/31/21 1443    Shawn-Barr Virus VCA, IgM [[id number]] Collected: 05/31/21 1352    Specimen: Blood Updated: 05/31/21 1443     Copper, Serum [[id number]] Collected: 05/31/21 1352    Specimen: Blood Updated: 05/31/21 1443    Erythropoietin [[id number]] Collected: 05/31/21 1352    Specimen: Blood Updated: 05/31/21 1443        PENDING RESULTS: HIT antibody, CMV IgM, EBV IgM, copper, erythropoietin, and SPEP    IMAGING REVIEWED:  No radiology results for the last day    I have reviewed the patient's labs, imaging, reports, and other clinician documentation.    Assessment/Plan   ASSESSMENT  1. Thrombocytopenia: Acute and significantly low.  No overt bleeding.  LFTs and coags normal.  Only possible culprit drug is heparin with dialysis and folate deficiency.  Other possible etiologies immune or viral.  Thrombocytopenia work-up pending. Started treatment with steroids with improvement in platelet count noted.  2. Anemia/Folate deficiency anemia: Anemia probably chronic due to ESRD.  Anemia work-up thus far revealed folate deficiency. Received Venofer and Retacrit on 5/31/2021 x1 dose with dialysis per nephrology.  Started on folate supplementation.  3. ESRD/Chronic kidney disease on chronic dialysis: On hemodialysis per nephrology.  4. Uninsured status:  consulted.     PLAN  1. Follow CBC.  2. SPEP.   3. Follow thrombocytopenia and anemia work-up results.  4. Start folic acid 1 mg p.o. once daily.  5. Continue Retacrit and Venofer per nephrology with hemodialysis.  6. Continue empiric treatment with steroids.    Electronically signed by LAISHA Gastelum, 06/01/21, 10:38 AM EDT.        I have personally performed a face-to-face diagnostic evaluation on this patient.  I have discussed the case with Natacha Amaya NP, have edited/reviewed the note, and agree with the care plan.  Denies any bleeding.  On examination, he has right chest wall Shiley.  Labs are significant for an improvement in platelet count and a low folic acid level.  He has been started on folate replacement for folic acid deficiency while waiting for rest  thrombocytopenia work-up results.        I discussed the patients findings and my recommendations with patient.    Electronically signed by Devan Bray MD, 06/01/21, 5:33 PM EDT.

## 2021-06-01 NOTE — CASE MANAGEMENT/SOCIAL WORK
Social Work Assessment  [facility]     Patient Name: Josh Klein  MRN: [id number]  Today's Date: 6/1/2021    Admit Date: 5/29/2021    Discharge Plan     Row Name 06/01/21 1627       Plan    Plan  DC Plan: Return home with brother. Needs o/p HD established, pending location from nephrology.    Plan Comments  Consulted d/t uninsured status & o/p HD needs. Met with pt & niece at bedside, pt gave permission to speak with niece present. Niece reports pt was previously at [facility] over two weeks ago & established with o/p David. However, niece reports when they went to first appointment, they were informed they couldn’t be seen at clinic. 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 Paid hourly rate with paystubs per conversation with pt. No trouble affording meds, pays out of pocket for medications. No PCP d/t uninsured status, will discuss [facility] prior to d/c. Pt gives SW permission to contact older niece who wasn’t present at time of conversation, Prabha ([phone]) for any questions. SW contacted [facility] ([facility], [phone] ext. [phone]) who reports the following per charting: had chair time assigned for [facility], but the medical director denied to accept, stating that [facility] physicians should treat individual d/t previously working with them. Referral was kicked back to [facility] ([facility], [facility]) . SW contacted [facility] (spoke to Melba, [phone]) who confirmed this. Contacted [facility]  at [phone], was directed to call Ruthy Pope ([facility] admissions) at [phone]. Per Ruthy, pt not accepted d/t not having permanent legal status. FAIZAN contacted pt’s niece, Prabha, who confirmed pt lives in [place], [place] & is okay with HD set-up at any location that will accept at this time. Prabha further reports she worked on emergency [employer] with [facility] SW (Lorna), requested phone number - Prabha to return call with number. Per [facility]  (Carmela Hale) they screened pt for emergency Medicaid, but pt had already worked on this with [employer] FAIZAN. [employer] unable to check status of application, but provided FAA in Chinese to family. FAIZAN will attempt to contact family. In addition, Prabha reports they plan to cancel a [employer] application that was started as pt is not eligible d/t being undocumented. FAIZAN sent secure chat to nephrologist (Dr. Velazquez), CM - regarding where outpatient referral for HD to be sent. Pending response.     Met with patient in room wearing PPE: mask, face shield/goggles, gloves, gown.      Maintained distance greater than six feet and spent less than 15 minutes in the room.      JAKOB Trejo    Phone: [phone]  Cell: [phone]  Fax: [phone]  [email or web address]

## 2021-06-01 NOTE — PLAN OF CARE
Goal Outcome Evaluation:   Patient has rested well throughout the day. Had HD today. SW is working on getting OP HD set up.

## 2021-06-01 NOTE — PROGRESS NOTES
[facility] Medicine Services Daily Progress Note      Hospitalist Team  LOS 0 days      Patient Care Team:  Provider, No Known as PCP - Devan Drew MD as Consulting Physician (Hematology and Oncology)    Patient Location: 201/1      Subjective   Subjective     Chief Complaint / Subjective  Chief Complaint   Patient presents with   • Hypotension     pt seen here on Sunday to receive dialysis-pt without insurance and recently started receiving dialysis with the hospital, pt has not received dialysis since last Sunday. pt denies any pain, swelling or SOA         Brief Synopsis of Hospital Course/HPI    Patient is a 50-year-old male with past medical history of end-stage renal disease on hemodialysis and hypertension who presented to the emergency room because of hypotension.  Patient was recently in the hospital and completed his hemodialysis.  Patient is unable to get dialysis because of his lack of insurance.  Patient needs emergent dialysis and was recommended for admission for further treatment and management.    Nephrology consult was completed.  Hematology consult was completed because of pancytopenia.        Date:: 6/1/2021.        Review of Systems   Constitutional: Negative.   HENT: Negative.    Eyes: Negative.    Cardiovascular: Negative.    Respiratory: Negative.    Endocrine: Negative.    Hematologic/Lymphatic: Negative.    Skin: Negative.    Musculoskeletal: Negative.    Gastrointestinal: Negative.    Genitourinary: Negative.    Neurological: Negative.    Psychiatric/Behavioral: Negative.    Allergic/Immunologic: Negative.          Objective   Objective      Vital Signs  Temp:  [97.8 °F (36.6 °C)-98.3 °F (36.8 °C)] 98 °F (36.7 °C)  Heart Rate:  [57-77] 77  Resp:  [13-17] 17  BP: (148-206)/() 148/80  Oxygen Therapy  SpO2: 95 %  Pulse Oximetry Type: Intermittent  Device (Oxygen Therapy): room air  Flowsheet Rows      First Filed Value   Admission Height  162.6  "cm (64\") Documented at 05/29/2021 1252   Admission Weight  61.9 kg (136 lb 7.4 oz) Documented at 05/29/2021 1252        Intake & Output (last 3 days)       05/29 0701 - 05/30 0700 05/30 0701 - 05/31 0700 05/31 0701 - 06/01 0700 06/01 0701 - 06/02 0700    P.O.  240    IV Piggyback 250 50      Total Intake(mL/kg) 490 (8.4) 636 (10.9) 1260 (21.3) 240 (4.1)    Other 2000 3000      Total Output 2000 3000      Net -1510 -2364 +1260 +240            Urine Unmeasured Occurrence   1 x         Lines, Drains & Airways    Active LDAs     Name:   Placement date:   Placement time:   Site:   Days:    CVC Double Lumen 05/06/21 Tunneled Right Subclavian   05/06/21    --    Subclavian   25    Peripheral IV 05/29/21 1340 Left Antecubital   05/29/21    1340    Antecubital   1                  Physical Exam:    Physical Exam  Vitals and nursing note reviewed.   Constitutional:       General: He is not in acute distress.     Comments: Patient was lying comfortably in bed and in no acute distress.   HENT:      Head: Normocephalic.      Nose: Nose normal.      Mouth/Throat:      Mouth: Mucous membranes are dry.      Pharynx: Oropharynx is clear.   Eyes:      Extraocular Movements: Extraocular movements intact.      Conjunctiva/sclera: Conjunctivae normal.      Pupils: Pupils are equal, round, and reactive to light.   Cardiovascular:      Pulses: Normal pulses.      Heart sounds: No murmur heard.   No friction rub. No gallop.       Comments: S1 and S2 present.  No tachycardia.  Pulmonary:      Effort: Pulmonary effort is normal.      Breath sounds: No stridor. No wheezing or rales.   Chest:      Chest wall: No tenderness.   Abdominal:      General: Bowel sounds are normal. There is no distension.      Palpations: Abdomen is soft.      Tenderness: There is no abdominal tenderness. There is no right CVA tenderness or guarding.   Musculoskeletal:         General: No swelling, tenderness, deformity or signs of injury.      Cervical " back: Normal range of motion. No rigidity.      Right lower leg: No edema.      Left lower leg: No edema.   Skin:     General: Skin is warm and dry.      Capillary Refill: Capillary refill takes less than 2 seconds.      Coloration: Skin is not jaundiced.      Findings: No bruising, erythema, lesion or rash.   Neurological:      General: No focal deficit present.   Psychiatric:      Comments: No agitation.               Procedures:              Results Review:     I reviewed the patient's new clinical results.      Lab Results (last 24 hours)     Procedure Component Value Units Date/Time    Hemoglobin A1c [[id number]]  (Normal) Collected: 05/30/21 0922    Specimen: Blood Updated: 06/01/21 0948     Hemoglobin A1C 5.0 %     Narrative:      Hemoglobin A1C Reference Range:    <5.7 %        Normal  5.7-6.4 %     Increased risk for diabetes  > 6.4 %        Diabetes       These guidelines have been recommended by the American Diabetic Association for Hgb A1c.      The following 2010 guidelines have been recommended by the American Diabetes Association for Hemoglobin A1c.    HBA1c 5.7-6.4% Increased risk for future diabetes (pre-diabetes)  HBA1c     >6.4% Diabetes      Basic Metabolic Panel [[id number]]  (Abnormal) Collected: 06/01/21 0516    Specimen: Blood Updated: 06/01/21 0712     Glucose 129 mg/dL      BUN 36 mg/dL      Comment: Result checked         Creatinine 7.92 mg/dL      Sodium 133 mmol/L      Potassium 4.6 mmol/L      Chloride 94 mmol/L      CO2 24.0 mmol/L      Calcium 8.7 mg/dL      eGFR  African Amer 9 mL/min/1.73      Comment: <15 Indicative of kidney failure.        eGFR Non African Amer 7 mL/min/1.73      Comment: <15 Indicative of kidney failure.        BUN/Creatinine Ratio 4.5     Anion Gap 15.0 mmol/L     Narrative:      GFR Normal >60  Chronic Kidney Disease <60  Kidney Failure <15      Magnesium [[id number]]  (Normal) Collected: 06/01/21 0516    Specimen: Blood Updated: 06/01/21 0649     Magnesium 2.2  mg/dL     CBC & Differential [[id number]]  (Abnormal) Collected: 06/01/21 0516    Specimen: Blood Updated: 06/01/21 0624    Narrative:      The following orders were created for panel order CBC & Differential.  Procedure                               Abnormality         Status                     ---------                               -----------         ------                     CBC Auto Differential[[id number]]        Abnormal            Final result                 Please view results for these tests on the individual orders.    CBC Auto Differential [[id number]]  (Abnormal) Collected: 06/01/21 0516    Specimen: Blood Updated: 06/01/21 0624     WBC 7.80 10*3/mm3      RBC 3.33 10*6/mm3      Hemoglobin 10.6 g/dL      Hematocrit 31.2 %      MCV 93.7 fL      MCH 31.7 pg      MCHC 33.8 g/dL      RDW 14.8 %      RDW-SD 46.8 fl      MPV 10.9 fL      Platelets 56 10*3/mm3      Comment: Result checked        Neutrophil % 78.3 %      Lymphocyte % 12.0 %      Monocyte % 9.3 %      Eosinophil % 0.1 %      Basophil % 0.3 %      Neutrophils, Absolute 6.10 10*3/mm3      Lymphocytes, Absolute 0.90 10*3/mm3      Monocytes, Absolute 0.70 10*3/mm3      Eosinophils, Absolute 0.00 10*3/mm3      Basophils, Absolute 0.00 10*3/mm3      nRBC 0.0 /100 WBC     Haptoglobin [[id number]]  (Normal) Collected: 05/31/21 1352    Specimen: Blood Updated: 05/31/21 1758     Haptoglobin 122 mg/dL     Reticulocytes [[id number]]  (Abnormal) Collected: 05/31/21 0856    Specimen: Blood Updated: 05/31/21 1651     Reticulocyte % 0.55 %      Reticulocyte Absolute 0.0180 10*6/mm3     Heparin-induced Platelet Antibody [[id number]] Collected: 05/31/21 1352    Specimen: Blood Updated: 05/31/21 1443    Cytomegalovirus Antibody, IgM [[id number]] Collected: 05/31/21 1352    Specimen: Blood Updated: 05/31/21 1443    Shawn-Barr Virus VCA, IgM [[id number]] Collected: 05/31/21 1352    Specimen: Blood Updated: 05/31/21 1443    Copper, Serum [[id number]] Collected: 05/31/21  1352    Specimen: Blood Updated: 05/31/21 1443    Erythropoietin [[id number]] Collected: 05/31/21 1352    Specimen: Blood Updated: 05/31/21 1443        Hemoglobin A1C   Date Value Ref Range Status   05/30/2021 5.0 3.5 - 5.6 % Final     Results from last 7 days   Lab Units 05/30/21  1015 05/29/21  1342   INR  0.97 0.93           No results found for: LIPASE  No results found for: CHOL, CHLPL, TRIG, HDL, LDL, LDLDIRECT    No results found for: INTRAOP, PREDX, FINALDX, COMDX    Microbiology Results (last 10 days)     Procedure Component Value - Date/Time    COVID PRE-OP / PRE-PROCEDURE SCREENING ORDER (NO ISOLATION) - Swab, Nasopharynx [[id number]]  (Normal) Collected: 05/29/21 1634    Lab Status: Final result Specimen: Swab from Nasopharynx Updated: 05/29/21 1703    Narrative:      The following orders were created for panel order COVID PRE-OP / PRE-PROCEDURE SCREENING ORDER (NO ISOLATION) - Swab, Nasopharynx.  Procedure                               Abnormality         Status                     ---------                               -----------         ------                     COVID-19,CEPHEID,COR/DARRYN...[[id number]]  Normal              Final result                 Please view results for these tests on the individual orders.    COVID-19,CEPHEID,COR/DARRYN/PAD IN-HOUSE(OR EMERGENT/ADD-ON),NP SWAB IN TRANSPORT MEDIA 3-4 HR TAT, RT-PCR - Swab, Nasopharynx [[id number]]  (Normal) Collected: 05/29/21 1634    Lab Status: Final result Specimen: Swab from Nasopharynx Updated: 05/29/21 1703     COVID19 Not Detected    Narrative:      Fact sheet for providers: https://www.fda.gov/media/364878/download     Fact sheet for patients: https://www.fda.gov/media/255668/download  Fact sheet for providers: https://www.fda.gov/media/061497/download    Fact sheet for patients: https://www.fda.gov/media/200930/download    Test performed by PCR.    COVID PRE-OP / PRE-PROCEDURE SCREENING ORDER (NO ISOLATION) - Swab, Nasopharynx [[id number]]   (Normal) Collected: 05/23/21 1659    Lab Status: Final result Specimen: Swab from Nasopharynx Updated: 05/23/21 1723    Narrative:      The following orders were created for panel order COVID PRE-OP / PRE-PROCEDURE SCREENING ORDER (NO ISOLATION) - Swab, Nasopharynx.  Procedure                               Abnormality         Status                     ---------                               -----------         ------                     COVID-19,CEPHEID,COR/DARRYN...[[id number]]  Normal              Final result                 Please view results for these tests on the individual orders.    COVID-19,CEPHEID,COR/DARRYN/PAD IN-HOUSE(OR EMERGENT/ADD-ON),NP SWAB IN TRANSPORT MEDIA 3-4 HR TAT, RT-PCR - Swab, Nasopharynx [[id number]]  (Normal) Collected: 05/23/21 1659    Lab Status: Final result Specimen: Swab from Nasopharynx Updated: 05/23/21 1723     COVID19 Not Detected    Narrative:      Fact sheet for providers: https://www.fda.gov/media/017114/download     Fact sheet for patients: https://www.fda.gov/media/850997/download  Fact sheet for providers: https://www.fda.gov/media/033777/download    Fact sheet for patients: https://www.fda.gov/media/330380/download    Test performed by PCR.          ECG/EMG Results (most recent)     Procedure Component Value Units Date/Time    ECG 12 Lead [[id number]] Collected: 05/29/21 1333     Updated: 05/30/21 0821     QT Interval 413 ms     Narrative:      HEART RATE= 70  bpm  RR Interval= 864  ms  MO Interval= 199  ms  P Horizontal Axis= -29  deg  P Front Axis= 47  deg  QRSD Interval= 110  ms  QT Interval= 413  ms  QRS Axis= -48  deg  T Wave Axis= 45  deg  - BORDERLINE ECG -  Sinus rhythm  Probable left ventricular hypertrophy  When compared with ECG of 23-May-2021 12:49:50,  Significant repolarization change  Electronically Signed By: Román Mcnair ([facility]) 30-May-2021 08:20:51  Date and Time of Study: 2021-05-29 13:33:44                  XR Chest 1 View    Result Date: 5/29/2021  No acute  cardiopulmonary abnormality or significant change.  Electronically Signed By-Iesha Winter MD On:5/29/2021 3:19 PM This report was finalized on [id number] by  Iesha Winter MD.          Xrays, labs reviewed personally by physician.    Medication Review:   I have reviewed the patient's current medication list      Scheduled Meds  calcitriol, 0.25 mcg, Oral, Daily  cloNIDine, 0.3 mg, Oral, TID  famotidine, 20 mg, Oral, BID AC  folic acid, 1 mg, Oral, Daily  losartan, 25 mg, Oral, Daily  predniSONE, 60 mg, Oral, Daily With Breakfast  sevelamer, 800 mg, Oral, TID With Meals  sodium bicarbonate, 650 mg, Oral, TID  sodium chloride, 10 mL, Intravenous, Q12H        Meds Infusions       Meds PRN  •  acetaminophen **OR** acetaminophen **OR** acetaminophen  •  acetaminophen  •  albumin human  •  heparin (porcine)  •  labetalol  •  melatonin  •  nitroglycerin  •  ondansetron **OR** ondansetron  •  sodium chloride        Assessment/Plan   Assessment/Plan     Active Hospital Problems    Diagnosis  POA   • **ESRD (end stage renal disease) on dialysis (CMS/[facility]) [N18.6, Z99.2]  Not Applicable     Priority: High  Follow nephrology recommendations.     • Anemia, chronic disease [D63.8]  Yes     Priority: Medium  Monitor H&H.    Thrombocytopenia  Follow hematology oncology recommendations.     • Essential hypertension [I10]  Yes     Priority: Medium  Treat with Catapres.     • Essential hypertension [I10]  Yes   • Chronic kidney disease on chronic dialysis (CMS/[facility]) [N18.6, Z99.2]  Follow nephrology recommendations.        DVT prophylaxis   -Heparin and SCDs.      Not Applicable      Resolved Hospital Problems    Diagnosis Date Resolved POA      Yes       MEDICAL DECISION MAKING COMPLEXITY BY PROBLEM:     Continue appropriate patient's home medications for other chronic medical conditions.  Continue the present level of care.  Patient and family agreed with the plan of care.          VTE Prophylaxis -   Mechanical Order History:       Ordered        05/30/21 1432  Place Sequential Compression Device  Once         05/30/21 1432  Maintain Sequential Compression Device  Continuous                 Pharmalogical Order History:      Ordered     Dose Route Frequency Stop    05/29/21 1710  heparin (porcine) 5000 UNIT/ML injection 5,000 Units  Status:  Discontinued      5,000 Units SC Every 12 Hours Scheduled 05/30/21 1432                  Code Status -   Code Status and Medical Interventions:   Ordered at: 05/29/21 1628     Code Status:    CPR     Medical Interventions (Level of Support Prior to Arrest):    Full       This patient has been examined wearing appropriate Personal Protective Equipment and discussed with hospital infection control department, [facility] department, infectious disease specialist and pulmonologist. 06/01/21        Discharge Planning  Pending patient's clinical improvement.        Electronically signed by Benjy Nayak MD, 06/01/21, 10:27 EDT.  [facility] Hospitalist Team

## 2021-06-02 PROBLEM — D69.6 THROMBOCYTOPENIA (HCC): Status: ACTIVE | Noted: 2021-06-02

## 2021-06-02 LAB
ALBUMIN SERPL ELPH-MCNC: 4.2 G/DL (ref 2.9–4.4)
ALBUMIN/GLOB SERPL: 1.2 {RATIO} (ref 0.7–1.7)
ALPHA1 GLOB SERPL ELPH-MCNC: 0.3 G/DL (ref 0–0.4)
ALPHA2 GLOB SERPL ELPH-MCNC: 0.7 G/DL (ref 0.4–1)
B-GLOBULIN SERPL ELPH-MCNC: 1.2 G/DL (ref 0.7–1.3)
CMV IGM SERPL IA-ACNC: <30 AU/ML (ref 0–29.9)
GAMMA GLOB SERPL ELPH-MCNC: 1.3 G/DL (ref 0.4–1.8)
GLOBULIN SER CALC-MCNC: 3.5 G/DL (ref 2.2–3.9)
LABORATORY COMMENT REPORT: NORMAL
M PROTEIN SERPL ELPH-MCNC: NORMAL G/DL
PROT PATTERN SERPL ELPH-IMP: NORMAL
PROT SERPL-MCNC: 7.7 G/DL (ref 6–8.5)

## 2021-06-02 PROCEDURE — 99233 SBSQ HOSP IP/OBS HIGH 50: CPT | Performed by: INTERNAL MEDICINE

## 2021-06-02 PROCEDURE — 63710000001 PREDNISONE PER 5 MG: Performed by: NURSE PRACTITIONER

## 2021-06-02 RX ADMIN — PREDNISONE 60 MG: 10 TABLET ORAL at 10:01

## 2021-06-02 RX ADMIN — CALCITRIOL CAPSULES 0.25 MCG 0.25 MCG: 0.25 CAPSULE ORAL at 09:56

## 2021-06-02 RX ADMIN — CLONIDINE HYDROCHLORIDE 0.3 MG: 0.1 TABLET ORAL at 09:56

## 2021-06-02 RX ADMIN — SEVELAMER CARBONATE 800 MG: 800 TABLET, FILM COATED ORAL at 18:00

## 2021-06-02 RX ADMIN — SODIUM BICARBONATE 650 MG: 650 TABLET ORAL at 09:56

## 2021-06-02 RX ADMIN — SODIUM BICARBONATE 650 MG: 650 TABLET ORAL at 20:02

## 2021-06-02 RX ADMIN — FAMOTIDINE 20 MG: 20 TABLET ORAL at 09:57

## 2021-06-02 RX ADMIN — FOLIC ACID 1 MG: 1 TABLET ORAL at 09:56

## 2021-06-02 RX ADMIN — SEVELAMER CARBONATE 800 MG: 800 TABLET, FILM COATED ORAL at 09:56

## 2021-06-02 RX ADMIN — SEVELAMER CARBONATE 800 MG: 800 TABLET, FILM COATED ORAL at 12:38

## 2021-06-02 RX ADMIN — CLONIDINE HYDROCHLORIDE 0.3 MG: 0.1 TABLET ORAL at 18:00

## 2021-06-02 RX ADMIN — FAMOTIDINE 20 MG: 20 TABLET ORAL at 18:00

## 2021-06-02 RX ADMIN — Medication 10 ML: at 20:02

## 2021-06-02 RX ADMIN — LOSARTAN POTASSIUM 25 MG: 25 TABLET, FILM COATED ORAL at 09:56

## 2021-06-02 RX ADMIN — Medication 10 ML: at 09:58

## 2021-06-02 RX ADMIN — CLONIDINE HYDROCHLORIDE 0.3 MG: 0.1 TABLET ORAL at 20:02

## 2021-06-02 RX ADMIN — SODIUM BICARBONATE 650 MG: 650 TABLET ORAL at 18:00

## 2021-06-02 NOTE — PROGRESS NOTES
"RENAL/KCC:     LOS: 1 day    Patient Care Team:  Provider, No Known as PCP - Devan Drew MD as Consulting Physician (Hematology and Oncology)    Chief Complaint:  ESRD    Subjective     Interval History:   Chart reviewed.  S/P HD yesterday.  BP at goal on last reading this AM.  Denies any CP or SOA.    Objective     Vital Sign Min/Max for last 24 hours  Temp  Min: 98 °F (36.7 °C)  Max: 99 °F (37.2 °C)   BP  Min: 123/61  Max: 169/87   Pulse  Min: 62  Max: 83   Resp  Min: 14  Max: 18   SpO2  Min: 97 %  Max: 97 %   No data recorded   No data recorded     Flowsheet Rows      First Filed Value   Admission Height  162.6 cm (64\") Documented at 05/29/2021 1252   Admission Weight  61.9 kg (136 lb 7.4 oz) Documented at 05/29/2021 1252          No intake/output data recorded.  I/O last 3 completed shifts:  In: 840 [P.O.:840]  Out: 2000 [Other:2000]    Physical Exam:  GEN: Awake, NAD  ENT: PERRL, EOMI, MMM  NECK: Supple, no JVD  CHEST: CTAB, no W/R/C  CV: RRR, no M/G/R  ABD: Soft, NT, +BS  SKIN: Warm and Dry  NEURO: CN's intact      WBC WBC   Date Value Ref Range Status   06/01/2021 7.80 3.40 - 10.80 10*3/mm3 Final   05/31/2021 6.50 3.40 - 10.80 10*3/mm3 Final      HGB Hemoglobin   Date Value Ref Range Status   06/01/2021 10.6 (L) 13.0 - 17.7 g/dL Final   05/31/2021 10.4 (L) 13.0 - 17.7 g/dL Final      HCT Hematocrit   Date Value Ref Range Status   06/01/2021 31.2 (L) 37.5 - 51.0 % Final   05/31/2021 31.0 (L) 37.5 - 51.0 % Final      Platlets No results found for: LABPLAT   MCV MCV   Date Value Ref Range Status   06/01/2021 93.7 79.0 - 97.0 fL Final   05/31/2021 94.1 79.0 - 97.0 fL Final          Sodium Sodium   Date Value Ref Range Status   06/01/2021 133 (L) 136 - 145 mmol/L Final   05/31/2021 132 (L) 136 - 145 mmol/L Final      Potassium Potassium   Date Value Ref Range Status   06/01/2021 4.6 3.5 - 5.2 mmol/L Final   05/31/2021 3.9 3.5 - 5.2 mmol/L Final      Chloride Chloride   Date Value Ref Range Status "   06/01/2021 94 (L) 98 - 107 mmol/L Final   05/31/2021 93 (L) 98 - 107 mmol/L Final      CO2 CO2   Date Value Ref Range Status   06/01/2021 24.0 22.0 - 29.0 mmol/L Final   05/31/2021 28.0 22.0 - 29.0 mmol/L Final      BUN BUN   Date Value Ref Range Status   06/01/2021 36 (H) 6 - 20 mg/dL Final     Comment:     Result checked    05/31/2021 17 6 - 20 mg/dL Final      Creatinine Creatinine   Date Value Ref Range Status   06/01/2021 7.92 (H) 0.76 - 1.27 mg/dL Final   05/31/2021 5.40 (H) 0.76 - 1.27 mg/dL Final      Calcium Calcium   Date Value Ref Range Status   06/01/2021 8.7 8.6 - 10.5 mg/dL Final   05/31/2021 8.4 (L) 8.6 - 10.5 mg/dL Final      PO4 No results found for: CAPO4   Albumin No results found for: ALBUMIN   Magnesium Magnesium   Date Value Ref Range Status   06/01/2021 2.2 1.6 - 2.6 mg/dL Final   05/31/2021 1.8 1.6 - 2.6 mg/dL Final      Uric Acid No results found for: URICACID        Results Review:     I reviewed the patient's new clinical results.    calcitriol, 0.25 mcg, Oral, Daily  cloNIDine, 0.3 mg, Oral, TID  famotidine, 20 mg, Oral, BID AC  folic acid, 1 mg, Oral, Daily  losartan, 25 mg, Oral, Daily  predniSONE, 60 mg, Oral, Daily With Breakfast  sevelamer, 800 mg, Oral, TID With Meals  sodium bicarbonate, 650 mg, Oral, TID  sodium chloride, 10 mL, Intravenous, Q12H           Medication Review: Reviewed    Assessment/Plan       ESRD (end stage renal disease) on dialysis (CMS/[facility])    Chronic kidney disease on chronic dialysis (CMS/[facility])    Essential hypertension    Anemia, chronic disease    Essential hypertension    Thrombocytopenia (CMS/[facility])      Plan: Continue HD TTS.  Needs emergency medicaid before he can get an outpatient HD spot.  Otherwise will need to continue coming to the hospital prn for HD.  Social work following.      Hitesh Streeter MD   [facility]  06/02/21  10:37 EDT

## 2021-06-02 NOTE — CASE MANAGEMENT/SOCIAL WORK
Continued Stay Note  TREVON Sandoval     Patient Name: Josh Klein  MRN: [id number]  Today's Date: 6/2/2021    Admit Date: 5/29/2021    Discharge Plan     Row Name 06/02/21 1645       Plan    Plan Comments  David contact Darlene [phone] Ext [phone] Tentative [facility].    Row Name 06/02/21 160       Plan    Plan Comments  Per Dr. Streeter, arrange outpatient HD with [facility]/ [facility]. CM notified & started this process today, 6/2. SW attempted to call [facility] care coordination department - voicemail box full- transferred to attendant. Left two voicemails 12:54pm & 4:10pm. Transferred to , Lorna Nesbitt, left another voicemail. Per pt/family, emergency Medicaid process was started by [facility] /care coordination department.    Row Name 06/02/21 154       Plan    Plan  DC Plan: Return home with brother. [facility] or [facility] requested for dialisys.        Met with patient in room wearing PPE: mask, goggles.      Maintained distance greater than six feet and spent less than 15 minutes in the room.               Sunil Holt RN

## 2021-06-02 NOTE — PROGRESS NOTES
Hematology/Oncology Inpatient Progress Note    PATIENT NAME: Josh Klein  : 1970  MRN: [id number]    CHIEF COMPLAINT: Acute thrombocytopenia    HISTORY OF PRESENT ILLNESS:    50 y.o. male presented to [facility] ER on 2021 reporting a need for hemodialysis.  He is an undocumented immigrant and has not been able to obtain insurance to receive hemodialysis as an outpatient.  He reported weakness dyspnea on exertion and a low blood pressure for the past 24 hours.  His chest x-ray was unremarkable. WBC 7.2, hemoglobin 10.3, MCV 94.2 and platelets 269,000.  His platelets started declining the following day, and were 33,000 on 2021.  PT 10.7 (9.6-11.7, PTT 25.2 (24-31), vitamin B12 424 (211-946), and LFTs were normal. According to the medical record, he was diagnosed with end-stage renal disease at [facility] on 2021 and was started on hemodialysis.  Since that time he has been unable to obtain insurance coverage for treatment of his renal disease.     21  Hematology/Oncology was consulted by Dr. Nayak for acute thrombocytopenia.  He denied any history of bleeding or low blood counts.     Past Medical History: End-stage renal disease and hypertension  Social History: He lives in [place] with his brother's family.  He has worked in construction.  He does not smoke or use alcohol or other recreational drugs.  Family History: His mother had hypertension.  Allergies: No known allergies.     PCP: Provider, No Known     INTERVAL HISTORY:  • 2021 -White blood count 7.8, hemoglobin 10.6, MCV 93.7, platelets 56,000. Folate 4.34 (4.78-24.20), iron 68 (), iron saturation 21 (20-50) transferrin 215 (200-360), TIBC 320 (298-536), reticulocytes 0.55 (0.7-1.9), haptoglobin 122 (). Started folic acid 1 mg p.o. once daily.  Received Venofer 200 mg IV and Retacrit 30,000 units subcu x1 dose per nephrology on 2021 with hemodialysis.  Received heparin on  dialysis.  • 6/2/2021-White blood count 7.8, hemoglobin 10.6, MCV 93.7, platelets 22,000, CMV IgM <30.0 (<30.0). Erythropoietin 9.4 (2.6-18.5).     History of present illness reviewed since last visit and changes noted on 06/02/21.    Subjective     He is feeling okay.    ROS:  Review of Systems   Constitutional: Negative for activity change, chills, fatigue, fever and unexpected weight change.   HENT: Negative for congestion, dental problem, hearing loss, mouth sores, nosebleeds, sore throat and trouble swallowing.    Eyes: Negative for photophobia and visual disturbance.   Respiratory: Negative for cough, chest tightness and shortness of breath.    Cardiovascular: Negative for chest pain, palpitations and leg swelling.   Gastrointestinal: Negative for abdominal distention, abdominal pain, blood in stool, constipation, diarrhea, nausea and vomiting.   Endocrine: Negative for cold intolerance and heat intolerance.   Genitourinary: Negative for decreased urine volume, difficulty urinating, dysuria, frequency, hematuria and urgency.   Musculoskeletal: Negative for arthralgias and gait problem.   Skin: Negative for rash and wound.   Neurological: Negative for dizziness, tremors, seizures, weakness, light-headedness, numbness and headaches.   Hematological: Negative for adenopathy. Does not bruise/bleed easily.        No bleeding.   Psychiatric/Behavioral: Negative for confusion and hallucinations. The patient is not nervous/anxious.    All other systems reviewed and are negative.     MEDICATIONS:    Scheduled Meds:  calcitriol, 0.25 mcg, Oral, Daily  cloNIDine, 0.3 mg, Oral, TID  famotidine, 20 mg, Oral, BID AC  folic acid, 1 mg, Oral, Daily  losartan, 25 mg, Oral, Daily  predniSONE, 60 mg, Oral, Daily With Breakfast  sevelamer, 800 mg, Oral, TID With Meals  sodium bicarbonate, 650 mg, Oral, TID  sodium chloride, 10 mL, Intravenous, Q12H       Continuous Infusions:      PRN Meds:  •  acetaminophen **OR** acetaminophen  "**OR** acetaminophen  •  acetaminophen  •  heparin (porcine)  •  labetalol  •  melatonin  •  nitroglycerin  •  ondansetron **OR** ondansetron  •  sodium chloride     ALLERGIES:  No Known Allergies    Objective    VITALS:   /61   Pulse 65   Temp 98.3 °F (36.8 °C) (Oral)   Resp 14   Ht 162.6 cm (64\")   Wt 59.1 kg (130 lb 3.2 oz)   SpO2 97%   BMI 22.35 kg/m²     PHYSICAL EXAM:  Physical Exam  Vitals and nursing note reviewed.   Constitutional:       General: He is not in acute distress.     Appearance: Normal appearance. He is well-developed. He is not diaphoretic.   HENT:      Head: Normocephalic and atraumatic.      Right Ear: External ear normal.      Left Ear: External ear normal.      Nose: Nose normal.      Mouth/Throat:      Mouth: Mucous membranes are moist.      Dentition: Abnormal dentition (Dental fillings).      Pharynx: Oropharynx is clear. No oropharyngeal exudate or posterior oropharyngeal erythema.   Eyes:      General: No scleral icterus.     Extraocular Movements: Extraocular movements intact.      Conjunctiva/sclera: Conjunctivae normal.      Pupils: Pupils are equal, round, and reactive to light.      Comments: Wears corrective lenses.    Cardiovascular:      Rate and Rhythm: Normal rate and regular rhythm.      Heart sounds: Normal heart sounds. No murmur heard.     Pulmonary:      Effort: Pulmonary effort is normal. No respiratory distress.      Breath sounds: Normal breath sounds. No wheezing or rales.   Chest:      Comments: Shiley catheter right chest wall.   Abdominal:      General: Bowel sounds are normal. There is no distension.      Palpations: Abdomen is soft. There is no mass.      Tenderness: There is no abdominal tenderness. There is no guarding.   Genitourinary:     Comments: Deferred   Musculoskeletal:         General: No swelling, tenderness or deformity. Normal range of motion.      Cervical back: Normal range of motion and neck supple.      Right lower leg: No edema. "      Left lower leg: No edema.      Comments: Left upper extremity peripheral IV.   Lymphadenopathy:      Cervical: No cervical adenopathy.      Upper Body:      Right upper body: No supraclavicular adenopathy.      Left upper body: No supraclavicular adenopathy.   Skin:     General: Skin is warm and dry.      Coloration: Skin is not pale.      Findings: No bruising, erythema or rash.   Neurological:      General: No focal deficit present.      Mental Status: He is alert and oriented to person, place, and time.      Cranial Nerves: No cranial nerve deficit.      Coordination: Coordination normal.   Psychiatric:         Mood and Affect: Mood normal.         Behavior: Behavior normal.         Thought Content: Thought content normal.         RECENT LABS:  Lab Results (last 24 hours)     Procedure Component Value Units Date/Time    Cytomegalovirus Antibody, IgM [[id number]] Collected: 05/31/21 1352    Specimen: Blood Updated: 06/02/21 0712     CMV IgM <30.0 AU/mL      Comment:                                 Negative         <30.0                                  Equivocal  30.0 - 34.9                                  Positive         >34.9  A positive result is generally indicative of acute  infection, reactivation or persistent IgM production.       Narrative:      Performed at:  [address] [place], [place] [id number]  : Jacky Ann PhD, Phone:  [phone]    Platelet Count [[id number]]  (Abnormal) Collected: 06/01/21 1756    Specimen: Blood Updated: 06/01/21 1848     Platelets 22 10*3/mm3         PENDING RESULTS: HIT antibody, EBV IgM, copper, and SPEP,    IMAGING REVIEWED:  No radiology results for the last day    I have reviewed the patient's labs, imaging, reports, and other clinician documentation.    Assessment/Plan   ASSESSMENT  1. Thrombocytopenia: Acute and significantly low.  No overt bleeding.  LFTs and coags normal.  Only possible culprit drug is heparin with dialysis  and folate deficiency.  Other possible etiologies immune or viral.  Thrombocytopenia work-up -CMV IgM negative - remainder of work-up is pending. Started treatment with oral steroids on 5/31/2021 with improvement in platelet count noted initially but platelets now declining.  Received heparin  with dialysis on 6/1/2021. Orders given to pharmacy for no heparin.  2. Anemia/Folate deficiency anemia: Anemia probably chronic due to ESRD.  Anemia work-up thus far revealed folate deficiency. Received Venofer and Retacrit on 5/31/2021 x1 dose with dialysis per nephrology.  Started on folate supplementation.  3. ESRD/Chronic kidney disease on chronic dialysis/Essential hypertension: On hemodialysis per nephrology. No heparin with HD due to thrombocytopenia. Needs emergency medicaid for outpatient HD.   4. Uninsured status:  consulted.     PLAN  1. Follow CBC.  2. Follow remaining thrombocytopenia and anemia work-up results.  3. Transfuse 1 unit SDP prn platelet count < 10,000 or with bleeding   4. Continue folic acid 1 mg p.o. once daily.  5. Continue prednisone 60 mg p.o. once daily  6. Continue Pepcid while on steroids.   7. Continue Retacrit and Venofer per nephrology with hemodialysis.  8. Discontinue heparin with hemodialysis. Orders given to pharmacy for no heparin.   9. Continue empiric treatment with steroids.    Electronically signed by LAISHA Gastelum, 06/01/21, 10:38 AM EDT.        I have personally performed a face-to-face diagnostic evaluation on this patient.  I have discussed the case with Natacha Amaya NP, have edited/reviewed the note, and agree with the care plan.  The patient claims to be feeling well with no complaints of bleeding.  On examination, he has right chest wall Shiley catheter.  Labs are significant for a drop in platelets to 22,000.  Patient apparently got heparin on dialysis on 6/1/2021.  This is the likely culprit.  We will hold any heparin flushes till further work-up  back.    I discussed the patients findings and my recommendations with patient.    Electronically signed by Devan Bray MD, 06/02/21, 4:33 PM EDT.

## 2021-06-02 NOTE — PROGRESS NOTES
[facility] Medicine Services Daily Progress Note      Hospitalist Team  LOS 1 days      Patient Care Team:  Provider, No Known as PCP - Devan Drew MD as Consulting Physician (Hematology and Oncology)    Patient Location: 201/1      Subjective   Subjective     Chief Complaint / Subjective  Chief Complaint   Patient presents with   • Hypotension     pt seen here on Sunday to receive dialysis-pt without insurance and recently started receiving dialysis with the hospital, pt has not received dialysis since last Sunday. pt denies any pain, swelling or SOA         Brief Synopsis of Hospital Course/HPI    Patient is a 50-year-old male with past medical history of anemia of chronic kidney disease, end-stage renal disease on hemodialysis and hypertension who presented to the emergency room because of hypotension.  Patient was recently in the hospital and completed his hemodialysis.  Patient is unable to get dialysis because of his lack of insurance.  Patient needs emergent dialysis and was recommended for admission for further treatment and management.    Nephrology consult was completed.  Hematology consult was completed because of thrombocytopenia.    No overnight events noted.        Date:: 6/2/2021.        Review of Systems   Constitutional: Negative.   HENT: Negative.    Eyes: Negative.    Cardiovascular: Negative.    Respiratory: Negative.    Endocrine: Negative.    Hematologic/Lymphatic: Negative.    Skin: Negative.    Musculoskeletal: Negative.    Gastrointestinal: Negative.    Genitourinary: Negative.    Neurological: Negative.    Psychiatric/Behavioral: Negative.    Allergic/Immunologic: Negative.          Objective   Objective      Vital Signs  Temp:  [98 °F (36.7 °C)-99 °F (37.2 °C)] 98.3 °F (36.8 °C)  Heart Rate:  [62-83] 65  Resp:  [14-18] 14  BP: (123-169)/(61-97) 123/61  Oxygen Therapy  SpO2: 97 %  Pulse Oximetry Type: Intermittent  Device (Oxygen Therapy): room  "air  Flowsheet Rows      First Filed Value   Admission Height  162.6 cm (64\") Documented at 05/29/2021 1252   Admission Weight  61.9 kg (136 lb 7.4 oz) Documented at 05/29/2021 1252        Intake & Output (last 3 days)       05/30 0701 - 05/31 0700 05/31 0701 - 06/01 0700 06/01 0701 - 06/02 0700 06/02 0701 - 06/03 0700    P.O. 586 1260 600     IV Piggyback 50       Total Intake(mL/kg) 636 (10.9) 1260 (21.3) 600 (10.2)     Other 3000  2000     Total Output 3000  2000     Net -2364 +1260 -1400             Urine Unmeasured Occurrence  1 x          Lines, Drains & Airways    Active LDAs     Name:   Placement date:   Placement time:   Site:   Days:    CVC Double Lumen 05/06/21 Tunneled Right Subclavian   05/06/21    --    Subclavian   25    Peripheral IV 05/29/21 1340 Left Antecubital   05/29/21    1340    Antecubital   1                  Physical Exam:    Physical Exam  Vitals and nursing note reviewed.   Constitutional:       General: He is not in acute distress.     Comments: Patient is somnolent but easily aroused.   HENT:      Head: Normocephalic.      Nose: Nose normal.      Mouth/Throat:      Mouth: Mucous membranes are dry.      Pharynx: Oropharynx is clear.   Eyes:      Extraocular Movements: Extraocular movements intact.      Conjunctiva/sclera: Conjunctivae normal.      Pupils: Pupils are equal, round, and reactive to light.   Cardiovascular:      Pulses: Normal pulses.      Heart sounds: No murmur heard.   No friction rub. No gallop.       Comments: S1 and S2 present.  No tachycardia.  Pulmonary:      Effort: Pulmonary effort is normal.      Breath sounds: No stridor. No wheezing or rales.   Chest:      Chest wall: No tenderness.   Abdominal:      General: Bowel sounds are normal. There is no distension.      Palpations: Abdomen is soft.      Tenderness: There is no abdominal tenderness. There is no right CVA tenderness or guarding.   Musculoskeletal:         General: No swelling, tenderness, deformity or " signs of injury.      Cervical back: Normal range of motion. No rigidity.      Right lower leg: No edema.      Left lower leg: No edema.   Skin:     General: Skin is warm and dry.      Capillary Refill: Capillary refill takes less than 2 seconds.      Coloration: Skin is not jaundiced.      Findings: No bruising, erythema, lesion or rash.   Neurological:      General: No focal deficit present.   Psychiatric:      Comments: No agitation.               Procedures:              Results Review:     I reviewed the patient's new clinical results.      Lab Results (last 24 hours)     Procedure Component Value Units Date/Time    Cytomegalovirus Antibody, IgM [[id number]] Collected: 05/31/21 1352    Specimen: Blood Updated: 06/02/21 0712     CMV IgM <30.0 AU/mL      Comment:                                 Negative         <30.0                                  Equivocal  30.0 - 34.9                                  Positive         >34.9  A positive result is generally indicative of acute  infection, reactivation or persistent IgM production.       Narrative:      Performed at:  [address] [place] [address]  [id number]  : Jacky Ann PhD, Phone:  [phone]    Platelet Count [[id number]]  (Abnormal) Collected: 06/01/21 1756    Specimen: Blood Updated: 06/01/21 1848     Platelets 22 10*3/mm3     Erythropoietin [[id number]] Collected: 05/31/21 1352    Specimen: Blood Updated: 06/01/21 1510     Erythropoietin 9.4 mIU/mL      Comment: [employer] DxI 800 Immunoassay System  Values obtained with different assay methods or kits cannot be used  interchangeably. Results cannot be interpreted as absolute evidence  of the presence or absence of malignant disease.       Narrative:      Performed at:  [address] [place] [address]  [id number]  : Jacky Ann PhD, Phone:  [phone]    Shawn-Barr Virus VCA, IgM [[id number]] Collected: 05/31/21 2408     Specimen: Blood Updated: 06/01/21 1510     EBV VCA IgM <36.0 U/mL      Comment:                                  Negative        <36.0                                   Equivocal 36.0 - 43.9                                   Positive        >43.9       Narrative:      Performed at:  [address] [place] [address]  [id number]  : Jacky Ann PhD, Phone:  [phone]        No results found for: HGBA1C  Results from last 7 days   Lab Units 05/30/21  1015 05/29/21  1342   INR  0.97 0.93           No results found for: LIPASE  No results found for: CHOL, CHLPL, TRIG, HDL, LDL, LDLDIRECT    No results found for: INTRAOP, PREDX, FINALDX, COMDX    Microbiology Results (last 10 days)     Procedure Component Value - Date/Time    COVID PRE-OP / PRE-PROCEDURE SCREENING ORDER (NO ISOLATION) - Swab, Nasopharynx [[id number]]  (Normal) Collected: 05/29/21 1634    Lab Status: Final result Specimen: Swab from Nasopharynx Updated: 05/29/21 1703    Narrative:      The following orders were created for panel order COVID PRE-OP / PRE-PROCEDURE SCREENING ORDER (NO ISOLATION) - Swab, Nasopharynx.  Procedure                               Abnormality         Status                     ---------                               -----------         ------                     COVID-19,CEPHEID,COR/DARRYN...[[id number]]  Normal              Final result                 Please view results for these tests on the individual orders.    COVID-19,CEPHEID,COR/DARRYN/PAD IN-HOUSE(OR EMERGENT/ADD-ON),NP SWAB IN TRANSPORT MEDIA 3-4 HR TAT, RT-PCR - Swab, Nasopharynx [[id number]]  (Normal) Collected: 05/29/21 1634    Lab Status: Final result Specimen: Swab from Nasopharynx Updated: 05/29/21 1703     COVID19 Not Detected    Narrative:      Fact sheet for providers: https://www.fda.gov/media/136889/download     Fact sheet for patients: https://www.fda.gov/media/457860/download  Fact sheet for providers:  https://www.fda.gov/media/751269/download    Fact sheet for patients: https://www.fda.gov/media/603614/download    Test performed by PCR.    COVID PRE-OP / PRE-PROCEDURE SCREENING ORDER (NO ISOLATION) - Swab, Nasopharynx [[id number]]  (Normal) Collected: 05/23/21 1659    Lab Status: Final result Specimen: Swab from Nasopharynx Updated: 05/23/21 1723    Narrative:      The following orders were created for panel order COVID PRE-OP / PRE-PROCEDURE SCREENING ORDER (NO ISOLATION) - Swab, Nasopharynx.  Procedure                               Abnormality         Status                     ---------                               -----------         ------                     COVID-19,CEPHEID,COR/DARRYN...[[id number]]  Normal              Final result                 Please view results for these tests on the individual orders.    COVID-19,CEPHEID,COR/DARRYN/PAD IN-HOUSE(OR EMERGENT/ADD-ON),NP SWAB IN TRANSPORT MEDIA 3-4 HR TAT, RT-PCR - Swab, Nasopharynx [[id number]]  (Normal) Collected: 05/23/21 1659    Lab Status: Final result Specimen: Swab from Nasopharynx Updated: 05/23/21 1723     COVID19 Not Detected    Narrative:      Fact sheet for providers: https://www.fda.gov/media/366388/download     Fact sheet for patients: https://www.fda.gov/media/976111/download  Fact sheet for providers: https://www.fda.gov/media/458091/download    Fact sheet for patients: https://www.fda.gov/media/926736/download    Test performed by PCR.          ECG/EMG Results (most recent)     Procedure Component Value Units Date/Time    ECG 12 Lead [[id number]] Collected: 05/29/21 1333     Updated: 05/30/21 0821     QT Interval 413 ms     Narrative:      HEART RATE= 70  bpm  RR Interval= 864  ms  MD Interval= 199  ms  P Horizontal Axis= -29  deg  P Front Axis= 47  deg  QRSD Interval= 110  ms  QT Interval= 413  ms  QRS Axis= -48  deg  T Wave Axis= 45  deg  - BORDERLINE ECG -  Sinus rhythm  Probable left ventricular hypertrophy  When compared with ECG of  23-May-2021 12:49:50,  Significant repolarization change  Electronically Signed By: Román Mcnair (Joseph) 30-May-2021 08:20:51  Date and Time of Study: 2021-05-29 13:33:44                  XR Chest 1 View    Result Date: 5/29/2021  No acute cardiopulmonary abnormality or significant change.  Electronically Signed By-Iesha Winter MD On:5/29/2021 3:19 PM This report was finalized on [id number] by  Iesha Winter MD.          Xrays, labs reviewed personally by physician.    Medication Review:   I have reviewed the patient's current medication list      Scheduled Meds  calcitriol, 0.25 mcg, Oral, Daily  cloNIDine, 0.3 mg, Oral, TID  famotidine, 20 mg, Oral, BID AC  folic acid, 1 mg, Oral, Daily  losartan, 25 mg, Oral, Daily  predniSONE, 60 mg, Oral, Daily With Breakfast  sevelamer, 800 mg, Oral, TID With Meals  sodium bicarbonate, 650 mg, Oral, TID  sodium chloride, 10 mL, Intravenous, Q12H        Meds Infusions       Meds PRN  •  acetaminophen **OR** acetaminophen **OR** acetaminophen  •  acetaminophen  •  heparin (porcine)  •  labetalol  •  melatonin  •  nitroglycerin  •  ondansetron **OR** ondansetron  •  sodium chloride        Assessment/Plan   Assessment/Plan     Active Hospital Problems    Diagnosis  POA   • **ESRD (end stage renal disease) on dialysis (CMS/[facility]) [N18.6, Z99.2]  Not Applicable     Priority: High  Follow nephrology recommendations.  Continue hemodialysis.     • Anemia, chronic disease [D63.8]  Yes     Priority: Medium  Monitor H&H.    Thrombocytopenia  Follow hematology oncology recommendations.     • Essential hypertension [I10]  Yes     Priority: Medium  Treat with Catapres.     • Essential hypertension [I10]  Yes   • Chronic kidney disease on chronic dialysis (CMS/HCC) [N18.6, Z99.2]  Follow nephrology recommendations.        DVT prophylaxis   -Heparin and SCDs.      Not Applicable      Resolved Hospital Problems    Diagnosis Date Resolved POA      Yes       MEDICAL DECISION MAKING  COMPLEXITY BY PROBLEM:     Continue appropriate patient's home medications for other chronic medical conditions.  Continue the present level of care.  Patient and family agreed with the plan of care.          VTE Prophylaxis -   Mechanical Order History:      Ordered        05/30/21 1432  Place Sequential Compression Device  Once         05/30/21 1432  Maintain Sequential Compression Device  Continuous                 Pharmalogical Order History:      Ordered     Dose Route Frequency Stop    05/29/21 1710  heparin (porcine) 5000 UNIT/ML injection 5,000 Units  Status:  Discontinued      5,000 Units SC Every 12 Hours Scheduled 05/30/21 1432                  Code Status -   Code Status and Medical Interventions:   Ordered at: 05/29/21 1628     Code Status:    CPR     Medical Interventions (Level of Support Prior to Arrest):    Full       This patient has been examined wearing appropriate Personal Protective Equipment and discussed with hospital infection control department, [facility] department, infectious disease specialist and pulmonologist. 06/02/21        Discharge Planning  Pending patient's clinical improvement.        Electronically signed by Benjy Nayak MD, 06/02/21, 10:30 EDT.  [facility] Hospitalist Team

## 2021-06-02 NOTE — PLAN OF CARE
Problem: Adult Inpatient Plan of Care  Goal: Absence of Hospital-Acquired Illness or Injury  Intervention: Identify and Manage Fall Risk  Recent Flowsheet Documentation  Taken 6/2/2021 1121 by Shannan Hernandez RN  Safety Promotion/Fall Prevention: safety round/check completed  Taken 6/2/2021 1000 by Shannan Hernandez RN  Safety Promotion/Fall Prevention:   activity supervised   assistive device/personal items within reach   clutter free environment maintained   nonskid shoes/slippers when out of bed   safety round/check completed   room organization consistent  Taken 6/2/2021 0955 by Shannan Hernandez RN  Safety Promotion/Fall Prevention: safety round/check completed  Taken 6/2/2021 0820 by Shannan Hernandez RN  Safety Promotion/Fall Prevention: safety round/check completed  Taken 6/2/2021 0730 by Shannan Hernandez RN  Safety Promotion/Fall Prevention: safety round/check completed  Intervention: Prevent Skin Injury  Recent Flowsheet Documentation  Taken 6/2/2021 1000 by Shannan Hernandez RN  Skin Protection:   adhesive use limited   skin-to-device areas padded   skin-to-skin areas padded  Intervention: Prevent Infection  Recent Flowsheet Documentation  Taken 6/2/2021 1000 by Shannan Hernandez RN  Infection Prevention:   hand hygiene promoted   single patient room provided   visitors restricted/screened  Goal: Optimal Comfort and Wellbeing  Intervention: Provide Person-Centered Care  Recent Flowsheet Documentation  Taken 6/2/2021 1000 by Shannan Hernandez RN  Trust Relationship/Rapport:   care explained   choices provided   questions answered   questions encouraged   thoughts/feelings acknowledged     Problem: Electrolyte Imbalance (Acute Kidney Injury/Impairment)  Goal: Serum Electrolyte Balance  Intervention: Monitor and Manage Electrolyte Imbalance  Recent Flowsheet Documentation  Taken 6/2/2021 1000 by Shannan Hernandez RN  Fluid/Electrolyte Management: fluids provided     Problem: Renal Function Impairment (Acute Kidney  Injury/Impairment)  Goal: Effective Renal Function  Intervention: Monitor and Support Renal Function  Recent Flowsheet Documentation  Taken 6/2/2021 1000 by Shannan Hernandez RN  Medication Review/Management: medications reviewed   Goal Outcome Evaluation:

## 2021-06-02 NOTE — CASE MANAGEMENT/SOCIAL WORK
Continued Stay Note   [facility]     Patient Name: Josh Klein  MRN: [id number]  Today's Date: 6/2/2021    Admit Date: 5/29/2021    Discharge Plan     Row Name 06/02/21 1603       Plan    Plan Comments  Per Dr. Streeter, arrange outpatient HD with [facility]/ [facility]. CM notified & started this process today, 6/2. SW attempted to call [facility] care coordination department - voicemail box full- transferred to attendant. Left two voicemails 12:54pm & 4:10pm. Transferred to , Lorna Nesbitt, left another voicemail. Per pt/family, emergency Medicaid process was started by [facility] /care coordination department.    Row Name 06/02/21 1540       Plan    Plan  DC Plan: Return home with brother. [facility] or [facility] requested for dialisys.     Phone communication or documentation only - no physical contact with patient or family.    JAKOB Trejo    Phone: [phone]  Cell: [phone]  Fax: [phone]  [email or web address]

## 2021-06-02 NOTE — DISCHARGE PLACEMENT REQUEST
"Deirdre Klein (50 y.o. Male)     Date of Birth Social Security Number Address Home Phone MRN    1970  [address] [place] [address] [phone] [id number]    Anabaptist Marital Status          Islam Single       Admission Date Admission Type Admitting Provider Attending Provider Department, Room/Bed    5/29/21 Emergency Benjy Nayak MD Olisa, Charles O, MD [address] PEDIATRICS, 201/1    Discharge Date Discharge Disposition Discharge Destination                       Attending Provider: Benjy Nayak MD    Allergies: Heparin    Isolation: None   Infection: None   Code Status: CPR    Ht: 162.6 cm (64\")   Wt: 59.1 kg (130 lb 3.2 oz)    Admission Cmt: None   Principal Problem: ESRD (end stage renal disease) on dialysis (CMS/[facility]) [N18.6,Z99.2]                 Active Insurance as of 5/29/2021     Primary Coverage     Payor Plan Insurance Group Employer/Plan Group    MEDICAID PEND[place]      Payor Plan Address Payor Plan Phone Number Payor Plan Fax Number Effective Dates       5/23/2021 - None Entered    Subscriber Name Subscriber Birth Date Member ID       DEIRDRE KLEIN 1970 [id number]                 Emergency Contacts      (Rel.) Home Phone Work Phone Mobile Phone    TINA FOLEY (Relative) [phone] -- [phone]        Hemodialysis Inpatient [DIA12] (Order [id number])  Order  Date: 6/2/2021 Department: [address] PEDIATRICS Ordering/Authorizing: Hitesh Streeter MD   Standing Order Information    Remaining Occurrences Interval Last Released     0/1 Once 6/2/2021            Order History  Inpatient  Date/Time Action Taken User Additional Information   06/02/21 1037 Sign Hitesh Streeter MD    06/02/21 1037 Release Instance Hitesh Streeter MD (auto-released) Released Order:[id number]   06/02/21 1117 Acknowledge Shannan Hernandez RN New Order   Order Details    Frequency Duration " Priority Order Class   Once 1  occurrence Routine Hospital Performed   Start Date/Time    Start Date Start Time   06/03/21 0000   Order Information    Order Date Service Start Date Start Time End Date   06/02/21 Medicine 06/03/21 0000 06/03/21   Comments    Tight heparin          Reference Links    Associated Reports   View Encounter   Order Questions    Question Answer Comment   Duration of Treatment 4.0 Hours    Access Site AVF    Dialyzer F160     mL/min    Dialysate Temperature (C) 36    BFR-As tolerated to a maximum of: 400 mL/min    Dialysate Solution Bath: K+ = 2 mEq, Ca = 2.5mEq    Bicarb 35 mEq    Na+ 135 mEq    Fluid Removal: IDWG    Notify [place] Department of order. Who did you speak with? left messafe for [facility]           Task Completed    Task Completed by Date/Time   Notify Dialysis Department of Hemodialysis Inpatient Mari Samaniego 06/02/21 1125   Source Order Set / Preference List    Order Set    IP GEN HEMODIALYSIS/ULTRAFILTRATION FOCUSED [[id number]]   Acknowledgement Info    For At Acknowledged By Acknowledged On   Placing Order 06/02/21 1037 Shannan Hernandez RN 06/02/21 1117   Reprint Order Requisition    Hemodialysis Inpatient (Order #[id number]) on 6/2/21   Encounter    View Encounter          Order Provider Info        Office phone Pager E-mail   Ordering User Hitesh Streeter [phone] -- --   Authorizing Provider Hitesh Streeter [phone] -- --   Attending Provider Benjy Nayak [phone] -- --   Linked Charges    No charges linked to this procedure   Tracking Reports  Cosign Tracking Order Transmittal Tracking   Authorized by:  Hitesh Streeter MD  (NPI: [id number])       Lab Component SmartPhrase Guide    Hemodialysis Inpatient (Order #[id number]) on 6/2/21            History & Physical      Chalo Mann PA-C at 05/30/21 1425     Attestation signed by Román Mcnair MD at 05/30/21 2222    For this patient  "encounter, I reviewed the NP or PA documentation, treatment plan, and medical decision making.                  [facility] H&P    Patient Name: Josh Klein  : 1970  MRN: [id number]  Primary Care Physician: Provider, No Known  Date of admission: 2021     Patient Care Team:  Provider, No Known as PCP - General          Subjective   History Present Illness     Chief Complaint:   Chief Complaint   Patient presents with   • Hypotension     pt seen here on  to receive dialysis-pt without insurance and recently started receiving dialysis with the hospital, pt has not received dialysis since last . pt denies any pain, swelling or SOA     Pain from any provider note on 2021  Pain from admitting provider HPI on 2021  50-year-old panic male presents saying \"I need dialysis\".  The patient states he last had dialysis on Monday.  He states that he has had increasing weakness and discomfort in the last 24 hours.  He states that he has had dyspnea on exertion.  He states that he felt lightheaded with exertion today and his blood pressure was checked and found to be low.  He states that he has been taking new medication for blood pressure that was recently prescribed.  It is unclear the patient may have taken additional medication secondary to getting short of breath for dialysis.  The patient has had no reports of fever and chills.  There is been some reports of nausea and chest tightness he denies diaphoresis or palpitations.  He denies melena hematemesis or hematochezia.  He reports no focal neurologic defects or lateralizing neurologic signs saying that he is globally weak.  The patient is apparently not following a renal diet     In the ED labs are notable for troponin of 0.042, creatinine: 15.26 with a BUN of 70 and a GFR of 3, anion gap: 23.0, potassium: 4.7, hemoglobin: 10.3 with a normal MCV and MCHC, INR: 0.93, PT: 10.3.  Chest x-ray shows no acute cardiopulmonary " abnormality or significant change.  EKG shows sinus rhythm at 70 with what appears to be left ventricular hypertrophy but no obvious acute ST changes or ectopy and a QTC of 444 ms.    5/30/2021: Patient reports is done generally well throughout the night with symptoms improving significantly following dialysis treatment.  He does note that his blood pressure medication seems to be causing his blood pressure to be low since resuming.             History of Present Illness    Review of Systems   Constitutional: Positive for malaise/fatigue. Negative for chills, diaphoresis and fever.   HENT: Negative.    Eyes: Negative.    Cardiovascular: Positive for dyspnea on exertion. Negative for chest pain, irregular heartbeat, leg swelling, near-syncope, palpitations and syncope.   Respiratory: Positive for shortness of breath. Negative for cough and sputum production.    Endocrine: Negative.    Skin: Negative.    Musculoskeletal: Negative.    Gastrointestinal: Negative.    Genitourinary: Negative.    Neurological: Positive for dizziness and weakness. Negative for focal weakness.   Psychiatric/Behavioral: Negative.          Personal History     Past Medical History:   Past Medical History:   Diagnosis Date   • History of renal dialysis    • Hypertension    • Renal disorder        Surgical History:      Past Surgical History:   Procedure Laterality Date   • VASCULAR SURGERY      tunneled catheter           Family History: family history includes Kidney disease in his mother. Otherwise pertinent FHx was reviewed and unremarkable.     Social History:  reports that he has quit smoking. He has quit using smokeless tobacco. He reports that he does not drink alcohol and does not use drugs.      Medications:  Prior to Admission medications    Medication Sig Start Date End Date Taking? Authorizing Provider   acetaminophen (TYLENOL) 325 MG tablet Take 650 mg by mouth Every 6 (Six) Hours As Needed for Mild Pain .   Yes Provider,  MD Ever   calcitriol (ROCALTROL) 0.25 MCG capsule Take 0.25 mcg by mouth Daily.   Yes Provider, MD Ever   cloNIDine (CATAPRES) 0.3 MG tablet Take 0.3 mg by mouth 3 (Three) Times a Day.   Yes ProviderEver MD   losartan (COZAAR) 25 MG tablet Take 25 mg by mouth Daily.   Yes Ever Christy MD   NIFEdipine XL (PROCARDIA XL) 90 MG 24 hr tablet Take 90 mg by mouth Daily.   Yes ProviderEver MD   sodium bicarbonate 650 MG tablet Take 1 tablet by mouth 3 (Three) Times a Day. 5/24/21  Yes Fabiola Fernandes APRN   sevelamer (RENAGEL) 800 MG tablet Take 800 mg by mouth 3 (Three) Times a Day With Meals.    Provider, MD Ever       Allergies:  No Known Allergies    Objective   Objective     Vital Signs  Temp:  [97.5 °F (36.4 °C)-98.5 °F (36.9 °C)] 98.2 °F (36.8 °C)  Heart Rate:  [49-75] 62  Resp:  [16-18] 16  BP: ()/(52-85) 125/74  SpO2:  [97 %-100 %] 97 %  on   ;   Device (Oxygen Therapy): room air  Body mass index is 21.99 kg/m².    Physical Exam  Vitals reviewed.   Constitutional:       General: He is not in acute distress.     Appearance: Normal appearance. He is normal weight. He is not ill-appearing, toxic-appearing or diaphoretic.   HENT:      Head: Normocephalic and atraumatic.      Right Ear: External ear normal.      Left Ear: External ear normal.      Nose: Nose normal.      Mouth/Throat:      Mouth: Mucous membranes are moist.   Eyes:      Extraocular Movements: Extraocular movements intact.   Neck:      Comments: No JVD present  Cardiovascular:      Rate and Rhythm: Normal rate and regular rhythm.      Pulses: Normal pulses.      Heart sounds: Normal heart sounds.   Pulmonary:      Effort: Pulmonary effort is normal.      Breath sounds: Normal breath sounds.   Abdominal:      General: Bowel sounds are normal. There is no distension.      Tenderness: There is no abdominal tenderness.   Musculoskeletal:         General: Normal range of motion.      Cervical back: Normal  range of motion.      Right lower leg: No edema.      Left lower leg: No edema.   Skin:     General: Skin is warm and dry.   Neurological:      General: No focal deficit present.      Mental Status: He is alert and oriented to person, place, and time.   Psychiatric:         Mood and Affect: Mood normal.         Behavior: Behavior normal.         Thought Content: Thought content normal.         Judgment: Judgment normal.     Results Review:  I have personally reviewed most recent cardiac tracings, lab results and radiology images and interpretations and agree with findings, most notably: Troponin, CBC, CMP, magnesium, INR/PT, PTT, chest x-ray and EKG.    Results from last 7 days   Lab Units 05/30/21  1015 05/30/21  0922   WBC 10*3/mm3  --  7.80   HEMOGLOBIN g/dL  --  9.9*   HEMATOCRIT %  --  29.4*   PLATELETS 10*3/mm3  --  92*   INR  0.97  --      Results from last 7 days   Lab Units 05/30/21  0922 05/29/21  1949 05/29/21  1342   SODIUM mmol/L 133*  --  135*   POTASSIUM mmol/L 4.5  --  4.7   CHLORIDE mmol/L 95*  --  95*   CO2 mmol/L 24.0  --  17.0*   BUN mg/dL 28*  --  70*   CREATININE mg/dL 8.38*  --  15.26*   GLUCOSE mg/dL 98  --  173*   CALCIUM mg/dL 7.7*  --  7.7*   ALT (SGPT) U/L  --   --  13   AST (SGOT) U/L  --   --  9   TROPONIN T ng/mL 0.044* 0.035* 0.042*     Estimated Creatinine Clearance: 8.5 mL/min (A) (by C-G formula based on SCr of 8.38 mg/dL (H)).  Brief Urine Lab Results     None          Microbiology Results (last 10 days)     Procedure Component Value - Date/Time    COVID PRE-OP / PRE-PROCEDURE SCREENING ORDER (NO ISOLATION) - Swab, Nasopharynx [[id number]]  (Normal) Collected: 05/29/21 1634    Lab Status: Final result Specimen: Swab from Nasopharynx Updated: 05/29/21 7573    Narrative:      The following orders were created for panel order COVID PRE-OP / PRE-PROCEDURE SCREENING ORDER (NO ISOLATION) - Swab, Nasopharynx.  Procedure                               Abnormality         Status                      ---------                               -----------         ------                     COVID-19,CEPHEID,COR/DARRYN...[[id number]]  Normal              Final result                 Please view results for these tests on the individual orders.    COVID-19,CEPHEID,COR/DARRYN/PAD IN-HOUSE(OR EMERGENT/ADD-ON),NP SWAB IN TRANSPORT MEDIA 3-4 HR TAT, RT-PCR - Swab, Nasopharynx [[id number]]  (Normal) Collected: 05/29/21 1634    Lab Status: Final result Specimen: Swab from Nasopharynx Updated: 05/29/21 1703     COVID19 Not Detected    Narrative:      Fact sheet for providers: https://www.fda.gov/media/482206/download     Fact sheet for patients: https://www.fda.gov/media/132665/download  Fact sheet for providers: https://www.fda.gov/media/410061/download    Fact sheet for patients: https://www.fda.gov/media/869777/download    Test performed by PCR.    COVID PRE-OP / PRE-PROCEDURE SCREENING ORDER (NO ISOLATION) - Swab, Nasopharynx [[id number]]  (Normal) Collected: 05/23/21 1659    Lab Status: Final result Specimen: Swab from Nasopharynx Updated: 05/23/21 1723    Narrative:      The following orders were created for panel order COVID PRE-OP / PRE-PROCEDURE SCREENING ORDER (NO ISOLATION) - Swab, Nasopharynx.  Procedure                               Abnormality         Status                     ---------                               -----------         ------                     COVID-19,CEPHEID,COR/DARRYN...[[id number]]  Normal              Final result                 Please view results for these tests on the individual orders.    COVID-19,CEPHEID,COR/DARRYN/PAD IN-HOUSE(OR EMERGENT/ADD-ON),NP SWAB IN TRANSPORT MEDIA 3-4 HR TAT, RT-PCR - Swab, Nasopharynx [[id number]]  (Normal) Collected: 05/23/21 1659    Lab Status: Final result Specimen: Swab from Nasopharynx Updated: 05/23/21 1723     COVID19 Not Detected    Narrative:      Fact sheet for providers: https://www.fda.gov/media/401198/download     Fact sheet for patients:  https://www.fda.gov/media/798589/download  Fact sheet for providers: https://www.fda.gov/media/076458/download    Fact sheet for patients: https://www.fda.gov/media/612478/download    Test performed by PCR.          ECG/EMG Results (most recent)     Procedure Component Value Units Date/Time    ECG 12 Lead [[id number]] Collected: 05/29/21 1333     Updated: 05/30/21 0821     QT Interval 413 ms     Narrative:      HEART RATE= 70  bpm  RR Interval= 864  ms  MT Interval= 199  ms  P Horizontal Axis= -29  deg  P Front Axis= 47  deg  QRSD Interval= 110  ms  QT Interval= 413  ms  QRS Axis= -48  deg  T Wave Axis= 45  deg  - BORDERLINE ECG -  Sinus rhythm  Probable left ventricular hypertrophy  When compared with ECG of 23-May-2021 12:49:50,  Significant repolarization change  Electronically Signed By: Román Mcnair (Joseph) 30-May-2021 08:20:51  Date and Time of Study: 2021-05-29 13:33:44                  XR Chest 1 View    Result Date: 5/29/2021  No acute cardiopulmonary abnormality or significant change.  Electronically Signed By-Iesha Winter MD On:5/29/2021 3:19 PM This report was finalized on [id number] by  Iesha Winter MD.    XR Chest 1 View    Result Date: 5/23/2021  No active disease.  Electronically Signed By-Dank Rodriguez MD On:5/23/2021 1:36 PM This report was finalized on [id number] by  Dank Rodriguez MD.        Estimated Creatinine Clearance: 8.5 mL/min (A) (by C-G formula based on SCr of 8.38 mg/dL (H)).    Assessment/Plan   Assessment/Plan       Active Hospital Problems    Diagnosis  POA   • **ESRD (end stage renal disease) on dialysis (CMS/[facility]) [N18.6, Z99.2]  Not Applicable     Priority: High   • Essential hypertension [I10]  Yes     Priority: Medium   • Chronic kidney disease on chronic dialysis (CMS/[facility]) [N18.6, Z99.2]  Not Applicable      Resolved Hospital Problems   No resolved problems to display.     End-stage renal disease  -Discharge note from 5/24/2021 notes patient was unable to establish  outpatient dialysis due to insurance status and had planned to travel to [place] to attempt to obtain emergency insurance and establish care.  Unfortunately this has not yet been completed.  -On admission creatinine was 15.26 with a BUN of 70 and a BUN/creatinine ratio of 4.6 with an EGFR of 3  -Nephrology was consulted in the ED patient underwent hemodialysis with repeat plan on 5/30/2021.  -Encouraged to continue working towards establishing insurance and outpatient care.  He confirms he is currently being seen at an outpatient clinic who is attempting to assist in his health care needs as possible     Elevated troponin  -Initial troponin: 0.042 and remained elevated serially, possibly due to volume overload  -Patient does report some recent MARLEY which he states seems to is resolved following his dialysis but denies any recent chest pain  -Continue cardiac monitoring    Thrombocytopenia  -Platelets: 92 down from 269  -Check B12, INR/PT and PTT  -4 T score: 14%  -No obvious signs of bleeding, continue to monitor CBC     Hypertension  -Patient initially presented hypotensive with a systolic blood pressure ranging between 94 and 100.  He was given albumin along with dialysis and has improved to become hypertensive at 153/84 at his most recent check  -We will resume losartan and clonidine, nifedipine was held to evaluate the effect of his medications in attempt to prevent recurrence of hypotension.  -Encouraged to monitor his blood pressure as an outpatient and continue following up with outpatient clinic            VTE Prophylaxis -   Mechanical Order History:     None      Pharmalogical Order History:      Ordered     Dose Route Frequency Stop    05/29/21 1710  heparin (porcine) 5000 UNIT/ML injection 5,000 Units      5,000 Units SC Every 12 Hours Scheduled --                CODE STATUS:    Code Status and Medical Interventions:   Ordered at: 05/29/21 8584     Code Status:    CPR     Medical Interventions (Level  "of Support Prior to Arrest):    Full           I discussed the patient's findings and my recommendations with patient and nursing staff.      Signature:Electronically signed by Chalo Mann PA-C, 05/30/21, 2:32 PM EDT.            Electronically signed by Román Mcnair MD at 05/30/21 2222       Hitesh Streeter MD   Physician   Nephrology   Progress Notes       Signed   Date of Service:  06/02/21 1037   Creation Time:  06/02/21 1037            Signed        Expand AllCollapse All      Show:Clear all  [x]Manual[x]Template[]Copied    Added by:  [x]Hitesh Streeter MD    []Hover for details  RENAL/KCC:      LOS: 1 day    Patient Care Team:  Provider, No Known as PCP - Devan Drew MD as Consulting Physician (Hematology and Oncology)     Chief Complaint:  ESRD        Subjective         Interval History:   Chart reviewed.  S/P HD yesterday.  BP at goal on last reading this AM.  Denies any CP or SOA.           Objective         Vital Sign Min/Max for last 24 hours  Temp  Min: 98 °F (36.7 °C)  Max: 99 °F (37.2 °C)   BP  Min: 123/61  Max: 169/87   Pulse  Min: 62  Max: 83   Resp  Min: 14  Max: 18   SpO2  Min: 97 %  Max: 97 %   No data recorded   No data recorded          Flowsheet Rows      First Filed Value   Admission Height  162.6 cm (64\") Documented at 05/29/2021 1252   Admission Weight  61.9 kg (136 lb 7.4 oz) Documented at 05/29/2021 1252             No intake/output data recorded.  I/O last 3 completed shifts:  In: 840 [P.O.:840]  Out: 2000 [Other:2000]     Physical Exam:  GEN: Awake, NAD  ENT: PERRL, EOMI, MMM  NECK: Supple, no JVD  CHEST: CTAB, no W/R/C  CV: RRR, no M/G/R  ABD: Soft, NT, +BS  SKIN: Warm and Dry  NEURO: CN's intact        WBC       WBC   Date Value Ref Range Status   06/01/2021 7.80 3.40 - 10.80 10*3/mm3 Final   05/31/2021 6.50 3.40 - 10.80 10*3/mm3 Final       HGB       Hemoglobin   Date Value Ref Range Status   06/01/2021 10.6 (L) 13.0 - 17.7 g/dL Final "   05/31/2021 10.4 (L) 13.0 - 17.7 g/dL Final       HCT       Hematocrit   Date Value Ref Range Status   06/01/2021 31.2 (L) 37.5 - 51.0 % Final   05/31/2021 31.0 (L) 37.5 - 51.0 % Final       Platlets No results found for: LABPLAT   MCV       MCV   Date Value Ref Range Status   06/01/2021 93.7 79.0 - 97.0 fL Final   05/31/2021 94.1 79.0 - 97.0 fL Final             Sodium       Sodium   Date Value Ref Range Status   06/01/2021 133 (L) 136 - 145 mmol/L Final   05/31/2021 132 (L) 136 - 145 mmol/L Final       Potassium       Potassium   Date Value Ref Range Status   06/01/2021 4.6 3.5 - 5.2 mmol/L Final   05/31/2021 3.9 3.5 - 5.2 mmol/L Final       Chloride       Chloride   Date Value Ref Range Status   06/01/2021 94 (L) 98 - 107 mmol/L Final   05/31/2021 93 (L) 98 - 107 mmol/L Final       CO2       CO2   Date Value Ref Range Status   06/01/2021 24.0 22.0 - 29.0 mmol/L Final   05/31/2021 28.0 22.0 - 29.0 mmol/L Final       BUN         BUN   Date Value Ref Range Status   06/01/2021 36 (H) 6 - 20 mg/dL Final       Comment:       Result checked    05/31/2021 17 6 - 20 mg/dL Final       Creatinine       Creatinine   Date Value Ref Range Status   06/01/2021 7.92 (H) 0.76 - 1.27 mg/dL Final   05/31/2021 5.40 (H) 0.76 - 1.27 mg/dL Final       Calcium       Calcium   Date Value Ref Range Status   06/01/2021 8.7 8.6 - 10.5 mg/dL Final   05/31/2021 8.4 (L) 8.6 - 10.5 mg/dL Final       PO4 No results found for: CAPO4   Albumin No results found for: ALBUMIN   Magnesium       Magnesium   Date Value Ref Range Status   06/01/2021 2.2 1.6 - 2.6 mg/dL Final   05/31/2021 1.8 1.6 - 2.6 mg/dL Final       Uric Acid No results found for: URICACID                    Results Review:                I reviewed the patient's new clinical results.     calcitriol, 0.25 mcg, Oral, Daily  cloNIDine, 0.3 mg, Oral, TID  famotidine, 20 mg, Oral, BID AC  folic acid, 1 mg, Oral, Daily  losartan, 25 mg, Oral, Daily  predniSONE, 60 mg, Oral, Daily With  Breakfast  sevelamer, 800 mg, Oral, TID With Meals  sodium bicarbonate, 650 mg, Oral, TID  sodium chloride, 10 mL, Intravenous, Q12H           Medication Review: Reviewed           Assessment/Plan           ESRD (end stage renal disease) on dialysis (CMS/HCC)    Chronic kidney disease on chronic dialysis (CMS/HCC)    Essential hypertension    Anemia, chronic disease    Essential hypertension    Thrombocytopenia (CMS/HCC)        Plan: Continue HD TTS.  Needs emergency medicaid before he can get an outpatient HD spot.  Otherwise will need to continue coming to the hospital prn for HD.  Social work following.        Hitesh Streeter MD   [facility]  06/02/21  10:37 EDT                        Hepatitis B Surface Antigen [[id number]] (Order [id number])  Order  Date: 5/24/2021 Department: [facility] OBSERVATION Released By: Marylin Conrad RN (auto-released) Authorizing: Beryl Short MD   Reprint Order Requisition    Hepatitis B Surface Antigen (Order #[id number]) on 5/24/21       Hepatitis B Surface Antigen  Order: [id number]  Status:  Final result   Visible to patient:  No (not released)   Next appt:  None  Specimen Information: Blood        Component   Ref Range & Units 9 d ago   Hepatitis B Surface Ag   Non-Reactive Non-Reactive    Resulting Agency [facility]         Specimen Collected: 05/24/21 07:10   Last Resulted: 05/24/21 08:02        Lab Flowsheet     Order Details     View Encounter     Lab and Collection Details     Routing     Result History              Routing History    Priority Sent On From To Message Type    5/25/2021 11:26 AM Interface, Ekg Results In Román Mcnair MD Results   Result Read / Acknowledged    Acknowledge result  No acknowledgement history exists for this order.   Lab Component SmartPhrase Guide    Hepatitis B Surface Antigen (Order #[id number]) on 5/24/21   Other Results from 5/23/2021     CBC Auto Differential  Final result 5/24/2021    Basic Metabolic  Panel  Final result 5/24/2021    Basic Metabolic Panel  Final result 5/23/2021    COVID-19,CEPHEID,COR/DARRYN/PAD IN-HOUSE(OR EMERGENT/ADD-ON),NP SWAB IN TRANSPORT MEDIA 3-4 HR TAT, RT-PCR - Swab, Nasopharynx  Final result 5/23/2021    Basic Metabolic Panel  Final result 5/23/2021    CBC Auto Differential  Final result 5/23/2021       COVID PRE-OP / PRE-PROCEDURE SCREENING ORDER (NO ISOLATION) - Swab, Nasopharynx [[id number]] (Order [id number])  Order  Date: 5/29/2021 Department: [address] PEDIATRICS Released By: Lolis Lockwood RN (auto-released) Authorizing: Román Mcnair MD   Linked Results    Procedure Abnormality Status   COVID-19,CEPHEID,COR/DARRYN/PAD IN-HOUSE(OR EMERGENT/ADD-ON),NP SWAB IN TRANSPORT MEDIA 3-4 HR TAT, RT-PCR - Swab, Nasopharynx Normal Final result   Reprint Order Requisition    COVID PRE-OP / PRE-PROCEDURE SCREENING ORDER (NO ISOLATION) - Swab, Nasopharynx (Order #[id number]) on 5/29/21       COVID PRE-OP / PRE-PROCEDURE SCREENING ORDER (NO ISOLATION) - Swab, Nasopharynx  Order: [id number]  Status:  Final result   Visible to patient:  No (not released)   Next appt:  None  Specimen Information: Nasopharynx; Swab              Specimen Collected: 05/29/21 16:34   Last Resulted: 05/29/21 17:03       Order Details     View Encounter     Lab and Collection Details     Routing     Result History              COVID-19,CEPHEID,COR/DARRYN/PAD IN-HOUSE(OR EMERGENT/ADD-ON),NP SWAB IN TRANSPORT MEDIA 3-4 HR TAT, RT-PCR - Swab, Nasopharynx  Order: [id number] - Part of Panel Order [id number]  Status:  Final result   Visible to patient:  No (not released)   Next appt:  None  Specimen Information: Nasopharynx; Swab        Component   Ref Range & Units    COVID19   Not Detected - Ref. Range Not Detected    Resulting Agency [facility]      Narrative  Performed by: [facility]  Fact sheet for providers: https://www.fda.gov/media/256975/download     Fact sheet for patients:  https://www.fda.gov/media/593814/download   Fact sheet for providers: https://www.fda.gov/media/929926/download     Fact sheet for patients: https://www.fda.gov/media/432701/download     Test performed by PCR.      Specimen Collected: 05/29/21 16:34   Last Resulted: 05/29/21 17:03       Order Details     View Encounter     Lab and Collection Details     Routing     Result History              Result Read / Acknowledged    Acknowledge result  COVID-19,CEPHEID,COR/DARRYN/PAD IN-HOUSE(OR EMERGENT/ADD-ON),NP SWAB IN TRANSPORT MEDIA 3-4 HR TAT, RT-PCR - Swab, Nasopharynx (Order [id number])    No acknowledgement history exists for this order.   Lab Component SmartPhrase Guide    COVID PRE-OP / PRE-PROCEDURE SCREENING ORDER (NO ISOLATION) - Swab, Nasopharynx (Order #[id number]) on 5/29/21   Other Results from 5/29/2021     Platelet Count  Final result 6/1/2021    Protein Elec + Interp, Serum  In process 6/1/2021    Basic Metabolic Panel  Final result 6/1/2021    Magnesium  Final result 6/1/2021    CBC Auto Differential  Final result 6/1/2021    Haptoglobin  Final result 5/31/2021    Copper, Serum  In process 5/31/2021    Cytomegalovirus Antibody, IgM  Final result 5/31/2021    Shawn-Barr Virus VCA, IgM  Final result 5/31/2021    Heparin-induced Platelet Antibody  In process 5/31/2021    Erythropoietin  Final result 5/31/2021    CBC Auto Differential  Final result 5/31/2021    Scan Slide  Final result 5/31/2021    Reticulocytes  Final result 5/31/2021    Basic Metabolic Panel  Final result 5/31/2021    Magnesium  Final result 5/31/2021    Ferritin  Final result 5/31/2021    Iron Profile  Final result 5/31/2021    Protime-INR  Final result 5/30/2021    aPTT  Final result 5/30/2021   (important suggestion)  Warning: Additional results from 5/29/2021 are available but are not displayed in this report.     XR Chest 1 View [[id number]] (Order [id number])  Order  Status: Final result   Patient Location    Patient Class Location    Inpatient [facility]ED[facility], [address], [address]    [phone]   Study Notes     Genesis Muhammad on 5/29/2021  3:05 PM   Renal failure with hypotension.       Appointment Information    PACS Images     Radiology Images   Study Result    Narrative & Impression   DATE OF EXAM:  5/29/2021 2:55 PM     PROCEDURE:  XR CHEST 1 VW-     INDICATIONS:  renal failure, hypotension     COMPARISON:  AP chest x-ray 05/23/2021.     TECHNIQUE:   Single radiographic AP view of the chest was obtained.     FINDINGS:  Tip of the right internal jugular central venous catheter terminates  near the cavoatrial junction. Cardiac silhouette appears mildly  enlarged. The lungs are well-expanded and clear. No pneumothorax or  large pleural effusion is seen.      IMPRESSION:  No acute cardiopulmonary abnormality or significant change.     Electronically Signed By-Iesha Winter MD On:5/29/2021 3:19 PM  This report was finalized on [id number] by  Iesha Winter MD.   Imaging    XR Chest 1 View (Order: [id number]) - 5/29/2021  Result History    XR Chest 1 View (Order #[id number]) on 5/29/2021 - Order Result History Report   Reprint Order Requisition    XR Chest 1 View (Order #[id number]) on 5/29/21   Signed by    Signed Date/Time  Phone Pager   IESHA WINTER 5/29/2021  3:19 PM [phone]  3:19 [phone]    Exam Information    Status Exam Begun  Exam Ended    Final [99] 5/29/2021 14:55 5/29/2021  3:00 PM [id number]  3:00 PM   Questions  Reason for Exam: renal failure      Portable Yes      External Results Report    Open External Results Report    Encounter    View Encounter             Order-Level Documents - 05/29/2021:    Scan on 5/29/2021 1333 by [employer], [place]: ECG 12-LEAD  Scan on 5/29/2021 by [employer], [place]: TELEMETRY, [facility], 06/01/[phone]  Scan on 5/29/2021 by [employer], [place]: TELEMETRY, [facility], 06/02/2021 0046  Scan on 5/29/2021 by [employer], [place]: TELEMETRY, [facility], 05/31/2021 2348  Scan on 5/29/2021 by [employer],  [place]: [facility], 05/31/2021 [phone]  Scan on 5/29/2021 by [employer], [place]: [facility], 05/30/2021-05/31/2021 [phone]  Scan on 5/29/2021 by [employer], [place]: [facility], 05/29/2021-05/30/2021 [phone]  Scan on 5/29/2021 by [employer], [place]: [facility], 05/29/2021 1802         Encounter-Level Documents - 05/29/2021:    Electronic signature on 5/29/2021 1315 - E-signed         Reason for Exam  Priority: STAT  renal failure   Results Routing Tracking    View Results Routing Information   Order Report    Order Details  Only active medications are shown.  Scheduled Meds Sorted by Name  for Josh Klein as of 06/02/21 1535   Legend:                          Inactive     Active     Linked                 Medications 06/02/21 06/03/21 06/04/21 06/05/21 06/06/21 06/07/21 06/08/21   calcitriol (ROCALTROL) capsule 0.25 mcg   Dose: 0.25 mcg  Freq: Daily Route: PO  Start: 05/30/21 0915    0956      0900      0900      0900      0900      0900      0900        cloNIDine (CATAPRES) tablet 0.3 mg   Dose: 0.3 mg  Freq: 3 Times Daily Route: PO  Start: 05/30/21 0915    Admin Instructions:   Caution: Look alike/sound alike drug alert. Please read the label.  Caution: Look alike/sound alike drug alert.    0956     1600     2100      0900     1600     2100      0900     1600     2100      0900     1600     2100      0900     1600     2100      0900     1600     2100      0900     1600     2100        famotidine (PEPCID) tablet 20 mg   Dose: 20 mg  Freq: 2 Times Daily Before Meals Route: PO  Start: 05/31/21 1415    0957     1730      0730     1730      0730     1730      0730     1730      0730     1730      0730     1730      0730     1730        folic acid (FOLVITE) tablet 1 mg   Dose: 1 mg  Freq: Daily Route: PO  Start: 06/01/21 1115    0956      0900      0900      0900      0900      0900      0900        losartan (COZAAR) tablet 25 mg   Dose: 25 mg  Freq: Daily Route: PO  Start: 05/30/21  0915    0956      0900      0900      0900      0900      0900      0900        predniSONE (DELTASONE) tablet 60 mg   Dose: 60 mg  Freq: Daily With Breakfast Route: PO  Start: 05/31/21 1415    Admin Instructions:   Take with food.    1001      0800      0800      0800      0800      0800      0800        sevelamer (RENVELA) tablet 800 mg   Dose: 800 mg  Freq: 3 Times Daily With Meals Route: PO  Start: 06/01/21 1200    Admin Instructions:   Swallow whole; do not crush, split or chew tablet.    0956     1238     1800      0800     1200     1800      0800     1200     1800      0800     1200     1800      0800     1200     1800      0800     1200     1800      0800     1200     1800        sodium bicarbonate tablet 650 mg   Dose: 650 mg  Freq: 3 Times Daily Route: PO  Start: 05/30/21 1600    0956     1600     2100      0900     1600     2100      0900     1600     2100      0900     1600     2100      0900     1600     2100      0900     1600     2100      0900     1600     2100        sodium chloride 0.9 % flush 10 mL   Dose: 10 mL  Freq: Every 12 Hours Scheduled Route: IV  Start: 05/29/21 2100 0958     2100      0900 2100      0900     2100      0900     2100      0900     2100      0900     2100      0900     2100        Medications 06/02/21 06/03/21 06/04/21 06/05/21 06/06/21 06/07/21 06/08/21       Continuous Meds Sorted by Name  for Josh Klein as of 06/02/21 1535   Legend:                          Inactive     Active     Linked                 Medications 06/02/21 06/03/21 06/04/21 06/05/21 06/06/21 06/07/21 06/08/21       PRN Meds Sorted by Name  for Josh Klein as of 06/02/21 1535   Legend:                          Inactive     Active     Linked                 Medications 06/02/21 06/03/21 06/04/21 06/05/21 06/06/21 06/07/21 06/08/21   acetaminophen (TYLENOL) tablet 650 mg   Dose: 650 mg  Freq: Every 4 Hours PRN Route: PO  PRN Reason: Mild Pain   Start: 05/29/21 1710    Admin Instructions:   Do  not exceed 4 grams of acetaminophen in a 24 hr period. Max dose of 2gm for AST/ALT greater than 120 units/L      If given for pain, use the following pain scale:   Mild Pain = Pain Score of 1-3, CPOT 1-2  Moderate Pain = Pain Score of 4-6, CPOT 3-4  Severe Pain = Pain Score of 7-10, CPOT 5-8             Or  acetaminophen (TYLENOL) 160 MG/5ML solution 650 mg   Dose: 650 mg  Freq: Every 4 Hours PRN Route: PO  PRN Reason: Mild Pain   Start: 05/29/21 1710    Admin Instructions:   Do not exceed 4 grams of acetaminophen in a 24 hr period. Max dose of 2gm for AST/ALT greater than 120 units/L      If given for pain, use the following pain scale:   Mild Pain = Pain Score of 1-3, CPOT 1-2  Moderate Pain = Pain Score of 4-6, CPOT 3-4  Severe Pain = Pain Score of 7-10, CPOT 5-8             Or  acetaminophen (TYLENOL) suppository 650 mg   Dose: 650 mg  Freq: Every 4 Hours PRN Route: RE  PRN Reason: Mild Pain   Start: 05/29/21 1710    Admin Instructions:   Do not exceed 4 grams of acetaminophen in a 24 hr period. Max dose of 2gm for AST/ALT greater than 120 units/L      If given for pain, use the following pain scale:   Mild Pain = Pain Score of 1-3, CPOT 1-2  Moderate Pain = Pain Score of 4-6, CPOT 3-4  Severe Pain = Pain Score of 7-10, CPOT 5-8             acetaminophen (TYLENOL) tablet 650 mg   Dose: 650 mg  Freq: Every 6 Hours PRN Route: PO  PRN Reason: Mild Pain   Start: 05/30/21 0821    Admin Instructions:   Do not exceed 4 grams of acetaminophen in a 24 hr period. Max dose of 2gm for AST/ALT greater than 120 units/L      If given for pain, use the following pain scale:   Mild Pain = Pain Score of 1-3, CPOT 1-2  Moderate Pain = Pain Score of 4-6, CPOT 3-4  Severe Pain = Pain Score of 7-10, CPOT 5-8             labetalol (NORMODYNE,TRANDATE) injection 25 mg   Dose: 25 mg  Freq: Every 4 Hours PRN Route: IV  PRN Reason: High Blood Pressure  Start: 06/01/21 0616    Admin Instructions:   As needed for SBP greater than 140  Give  by slow IV Push each 20mg (or less) over 2 minutes             melatonin tablet 5 mg   Dose: 5 mg  Freq: Nightly PRN Route: PO  PRN Reason: Sleep  Start: 05/29/21 1710             nitroglycerin (NITROSTAT) SL tablet 0.4 mg   Dose: 0.4 mg  Freq: Every 5 Minutes PRN Route: SL  PRN Reason: Chest Pain  PRN Comment: Only if SBP Greater Than 100  Start: 05/29/21 1710    Admin Instructions:   If Pain Unrelieved After 3 Doses Notify MD             ondansetron (ZOFRAN) tablet 4 mg   Dose: 4 mg  Freq: Every 6 Hours PRN Route: PO  PRN Reasons: Nausea,Vomiting  Start: 05/29/21 1710    Admin Instructions:   If BOTH ondansetron (ZOFRAN) and promethazine (PHENERGAN) are ordered use ondansetron first and THEN promethazine IF ondansetron is ineffective.             Or  ondansetron (ZOFRAN) injection 4 mg   Dose: 4 mg  Freq: Every 6 Hours PRN Route: IV  PRN Reasons: Nausea,Vomiting  Start: 05/29/21 1710    Admin Instructions:   If BOTH ondansetron (ZOFRAN) and promethazine (PHENERGAN) are ordered use ondansetron first and THEN promethazine IF ondansetron is ineffective.             Pharmacy Message   Dose: 1 each  Freq: Once As Needed Route: XX  PRN Comment: .  Indications Comment: .  Start: 06/02/21 1529    Admin Instructions:   Message to Pharmacy  NO HEPARIN please due to thrombocytopenia.  Electronically signed by LAISHA Gastelum, 06/02/21, 1:58 PM EDT.  This order should only be used as a last resort for a complicated order.             sodium chloride 0.9 % flush 10 mL   Dose: 10 mL  Freq: As Needed Route: IV  PRN Reason: Line Care  Start: 05/29/21 1710             Medications 06/02/21 06/03/21 06/04/21 06/05/21 06/06/21 06/07/21 06/08/21

## 2021-06-03 ENCOUNTER — APPOINTMENT (OUTPATIENT)
Dept: CARDIOLOGY | Facility: HOSPITAL | Age: 51
End: 2021-06-03

## 2021-06-03 LAB
ANISOCYTOSIS BLD QL: NORMAL
BASOPHILS # BLD AUTO: 0.1 10*3/MM3 (ref 0–0.2)
BASOPHILS NFR BLD AUTO: 0.4 % (ref 0–1.5)
BH CV VAS DIALYSIS ARTERIAL ANASTOMOSIS EDV: 402 CM/SEC
BH CV VAS DIALYSIS ARTERIAL ANASTOMOSIS PSV: 644 CM/SEC
BH CV VAS DIALYSIS CONDUIT DIST DEPTH: 0.25 CM
BH CV VAS DIALYSIS CONDUIT DIST DIAMETER: 0.57 CM
BH CV VAS DIALYSIS CONDUIT DIST EDV: 81 CM/SEC
BH CV VAS DIALYSIS CONDUIT DIST FLOW VOL: 782 ML/MIN
BH CV VAS DIALYSIS CONDUIT DIST PSV: 132 CM/SEC
BH CV VAS DIALYSIS CONDUIT MID DEPTH: 0.22 CM
BH CV VAS DIALYSIS CONDUIT MID DIAMETER: 0.59 CM
BH CV VAS DIALYSIS CONDUIT MID EDV: 111 CM/SEC
BH CV VAS DIALYSIS CONDUIT MID FLOW VOL: 789 ML/MIN
BH CV VAS DIALYSIS CONDUIT MID PSV: 212 CM/SEC
BH CV VAS DIALYSIS CONDUIT PROX DEPTH: 0.31 CM
BH CV VAS DIALYSIS CONDUIT PROX DIAMETER: 0.51 CM
BH CV VAS DIALYSIS CONDUIT PROX EDV: 163 CM/SEC
BH CV VAS DIALYSIS CONDUIT PROX FLOW VOL: 855 ML/MIN
BH CV VAS DIALYSIS CONDUIT PROX PSV: 284 CM/SEC
BH CV VAS DIALYSIS PRE-INFLOW RADIAL EDV: 162 CM/SEC
BH CV VAS DIALYSIS PRE-INFLOW RADIAL FLOW VOL: 848 ML/MIN
BH CV VAS DIALYSIS PRE-INFLOW RADIAL PSV: 226 CM/SEC
BH CV VAS DIALYSIS RIGHT BRANCH 1 DIAMETER: 0.21 CM
BH CV VAS DIALYSIS RIGHT BRANCH 2 DIAMETER: 0.42 CM
BH CV VAS DIALYSIS VENOUS OUTFLOW CEPHALIC VEIN DIAMETE: 0.62 CM
BH CV VAS DIALYSIS VENOUS OUTFLOW CEPHALIC VEIN EDV: 20 CM/SEC
BH CV VAS DIALYSIS VENOUS OUTFLOW CEPHALIC VEIN PSV: 36 CM/SEC
BH CV VAS MEAS BASILIC ANTECUBITAL FOSSA RIGHT: 0.47 CM
BH CV VAS MEAS BASILIC FOREARM LEFT - DIST: 0.19 CM
BH CV VAS MEAS BASILIC FOREARM LEFT - MID: 0.25 CM
BH CV VAS MEAS BASILIC FOREARM LEFT - PROX: 0.23 CM
BH CV VAS MEAS BASILIC UPPER ARM LEFT - DIST: 0.51 CM
BH CV VAS MEAS BASILIC UPPER ARM LEFT - MID: 0.46 CM
BH CV VAS MEAS BASILIC UPPER ARM LEFT - PROX: 0.45 CM
BH CV VAS MEAS BASILIC UPPER ARM RIGHT - DIST: 0.55 CM
BH CV VAS MEAS BASILIC UPPER ARM RIGHT - MID: 0.48 CM
BH CV VAS MEAS BASILIC UPPER ARM RIGHT - PROX: 0.53 CM
BH CV VAS MEAS CEPHALIC ANTECUBITAL FOSSA RIGHT: 0.54 CM
BH CV VAS MEAS CEPHALIC FOREARM LEFT - DIST: 0.4 CM
BH CV VAS MEAS CEPHALIC FOREARM LEFT - MID: 0.39 CM
BH CV VAS MEAS CEPHALIC FOREARM LEFT - PROX: 0.43 CM
BH CV VAS MEAS CEPHALIC FOREARM RIGHT - DIST: 0.54 CM
BH CV VAS MEAS CEPHALIC FOREARM RIGHT - MID: 0.59 CM
BH CV VAS MEAS CEPHALIC FOREARM RIGHT - PROX: 0.58 CM
BH CV VAS MEAS CEPHALIC UPPER ARM LEFT - PROX: 0.1 CM
BH CV VAS MEAS CEPHALIC UPPER ARM RIGHT - DIST: 0.6 CM
BH CV VAS MEAS CEPHALIC UPPER ARM RIGHT - MID: 0.61 CM
BH CV VAS MEAS CEPHALIC UPPER ARM RIGHT - PROX: 0.53 CM
BH CV VAS MEAS RADIAL UPPER ARM LEFT - DIST: 0.18 CM
BH CV VAS MEAS RADIAL UPPER ARM LEFT - MID: 0.17 CM
BH CV VAS MEAS RADIAL UPPER ARM LEFT - PROX: 0.25 CM
BH CV VAS MEAS RADIAL UPPER ARM RIGHT - DIST: 0.45 CM
BH CV VAS MEAS RADIAL UPPER ARM RIGHT - MID: 0.47 CM
BH CV VAS MEAS RADIAL UPPER ARM RIGHT - PROX: 0.49 CM
COPPER SERPL-MCNC: 101 UG/DL (ref 69–132)
DEPRECATED RDW RBC AUTO: 47.7 FL (ref 37–54)
EOSINOPHIL # BLD AUTO: 0.1 10*3/MM3 (ref 0–0.4)
EOSINOPHIL NFR BLD AUTO: 0.5 % (ref 0.3–6.2)
ERYTHROCYTE [DISTWIDTH] IN BLOOD BY AUTOMATED COUNT: 14.7 % (ref 12.3–15.4)
HCT VFR BLD AUTO: 33.9 % (ref 37.5–51)
HGB BLD-MCNC: 11.2 G/DL (ref 13–17.7)
LARGE PLATELETS: NORMAL
LYMPHOCYTES # BLD AUTO: 2.3 10*3/MM3 (ref 0.7–3.1)
LYMPHOCYTES NFR BLD AUTO: 11.5 % (ref 19.6–45.3)
MAXIMAL PREDICTED HEART RATE: 168 BPM
MAXIMAL PREDICTED HEART RATE: 168 BPM
MCH RBC QN AUTO: 30.8 PG (ref 26.6–33)
MCHC RBC AUTO-ENTMCNC: 33.2 G/DL (ref 31.5–35.7)
MCV RBC AUTO: 92.8 FL (ref 79–97)
MONOCYTES # BLD AUTO: 1.9 10*3/MM3 (ref 0.1–0.9)
MONOCYTES NFR BLD AUTO: 9.7 % (ref 5–12)
NEUTROPHILS NFR BLD AUTO: 15.7 10*3/MM3 (ref 1.7–7)
NEUTROPHILS NFR BLD AUTO: 77.9 % (ref 42.7–76)
NRBC BLD AUTO-RTO: 0.1 /100 WBC (ref 0–0.2)
PF4 HEPARIN CMPLX IGG SERPL IA: 0.13 OD (ref 0–0.4)
PLATELET # BLD AUTO: 36 10*3/MM3 (ref 140–450)
PMV BLD AUTO: 8.7 FL (ref 6–12)
RBC # BLD AUTO: 3.65 10*6/MM3 (ref 4.14–5.8)
SMALL PLATELETS BLD QL SMEAR: NORMAL
STRESS TARGET HR: 143 BPM
STRESS TARGET HR: 143 BPM
UPPER ARTERIAL LEFT ARM BRACHIAL LENGTH: 0.53 CM
UPPER ARTERIAL RIGHT ARM BRACHIAL LENGTH: 0.6 CM
WBC # BLD AUTO: 20.1 10*3/MM3 (ref 3.4–10.8)
WBC MORPH BLD: NORMAL

## 2021-06-03 PROCEDURE — 99232 SBSQ HOSP IP/OBS MODERATE 35: CPT | Performed by: INTERNAL MEDICINE

## 2021-06-03 PROCEDURE — 93990 DOPPLER FLOW TESTING: CPT

## 2021-06-03 PROCEDURE — 85007 BL SMEAR W/DIFF WBC COUNT: CPT | Performed by: NURSE PRACTITIONER

## 2021-06-03 PROCEDURE — 93986 DUP-SCAN HEMO COMPL UNI STD: CPT

## 2021-06-03 PROCEDURE — 85025 COMPLETE CBC W/AUTO DIFF WBC: CPT | Performed by: NURSE PRACTITIONER

## 2021-06-03 PROCEDURE — 5A1D70Z PERFORMANCE OF URINARY FILTRATION, INTERMITTENT, LESS THAN 6 HOURS PER DAY: ICD-10-PCS | Performed by: INTERNAL MEDICINE

## 2021-06-03 RX ORDER — ALBUMIN (HUMAN) 12.5 G/50ML
12.5 SOLUTION INTRAVENOUS AS NEEDED
Status: ACTIVE | OUTPATIENT
Start: 2021-06-03 | End: 2021-06-03

## 2021-06-03 RX ORDER — FAMOTIDINE 20 MG/1
10 TABLET, FILM COATED ORAL DAILY
Status: DISCONTINUED | OUTPATIENT
Start: 2021-06-04 | End: 2021-06-05

## 2021-06-03 RX ORDER — FAMOTIDINE 20 MG/1
20 TABLET, FILM COATED ORAL DAILY
Status: DISCONTINUED | OUTPATIENT
Start: 2021-06-04 | End: 2021-06-03

## 2021-06-03 RX ADMIN — SEVELAMER CARBONATE 800 MG: 800 TABLET, FILM COATED ORAL at 18:07

## 2021-06-03 RX ADMIN — CLONIDINE HYDROCHLORIDE 0.3 MG: 0.1 TABLET ORAL at 18:08

## 2021-06-03 RX ADMIN — CLONIDINE HYDROCHLORIDE 0.3 MG: 0.1 TABLET ORAL at 21:13

## 2021-06-03 RX ADMIN — Medication 10 ML: at 21:13

## 2021-06-03 RX ADMIN — FOLIC ACID 1 MG: 1 TABLET ORAL at 13:52

## 2021-06-03 RX ADMIN — LABETALOL 20 MG/4 ML (5 MG/ML) INTRAVENOUS SYRINGE 25 MG: at 04:14

## 2021-06-03 RX ADMIN — Medication 10 ML: at 13:53

## 2021-06-03 RX ADMIN — LOSARTAN POTASSIUM 25 MG: 25 TABLET, FILM COATED ORAL at 13:52

## 2021-06-03 RX ADMIN — SODIUM BICARBONATE 650 MG: 650 TABLET ORAL at 18:07

## 2021-06-03 RX ADMIN — SODIUM BICARBONATE 650 MG: 650 TABLET ORAL at 21:14

## 2021-06-03 RX ADMIN — CALCITRIOL CAPSULES 0.25 MCG 0.25 MCG: 0.25 CAPSULE ORAL at 13:52

## 2021-06-03 NOTE — PROGRESS NOTES
Hematology/Oncology Inpatient Progress Note    PATIENT NAME: Josh Klein  : 1970  MRN: [id number]    CHIEF COMPLAINT: Acute thrombocytopenia    HISTORY OF PRESENT ILLNESS:    50 y.o. male presented to [facility] ER on 2021 reporting a need for hemodialysis.  He is an undocumented immigrant and has not been able to obtain insurance to receive hemodialysis as an outpatient.  He reported weakness dyspnea on exertion and a low blood pressure for the past 24 hours.  His chest x-ray was unremarkable. WBC 7.2, hemoglobin 10.3, MCV 94.2 and platelets 269,000.  His platelets started declining the following day, and were 33,000 on 2021.  PT 10.7 (9.6-11.7, PTT 25.2 (24-31), vitamin B12 424 (211-946), and LFTs were normal. According to the medical record, he was diagnosed with end-stage renal disease at [facility] on 2021 and was started on hemodialysis.  Since that time he has been unable to obtain insurance coverage for treatment of his renal disease.     21  Hematology/Oncology was consulted by Dr. Nayak for acute thrombocytopenia.  He denied any history of bleeding or low blood counts.     Past Medical History: End-stage renal disease and hypertension.  Surgical History:  Vascular surgery.  Social History: He lives in [place], [place] with his brother's family.  He has worked in construction.  He does not smoke or use alcohol or other recreational drugs.  Family History: His mother had hypertension.  Allergies: No known allergies.     PCP: Provider, No Known     INTERVAL HISTORY:  • 2021 -White blood count 7.8, hemoglobin 10.6, MCV 93.7, platelets 56,000. Folate 4.34 (4.78-24.20), iron 68 (), iron saturation 21 (20-50) transferrin 215 (200-360), TIBC 320 (298-536), reticulocytes 0.55 (0.7-1.9), haptoglobin 122 (). Started folic acid 1 mg p.o. once daily.  Received Venofer 200 mg IV and Retacrit 30,000 units subcu x1 dose per nephrology on 2021 with  hemodialysis.  Received heparin on dialysis.  • 6/2/2021- On 6/1/2021 1848 platelets 22,000, CMV IgM <30.0 (<30.0), erythropoietin 9.4 (2.6-18.5).   · 6/3/2021 -  EBV IgM <36.0 (<36.0), copper 101 (), and SPEP -no M spike or monoclonal protein is not present.     History of present illness reviewed since last visit and changes noted on 06/03/21.    Subjective     He feels fine.  His blood pressure is elevated in the morning.    ROS:  Review of Systems   Constitutional: Negative for activity change, chills, fatigue, fever and unexpected weight change.   HENT: Negative for congestion, dental problem, drooling, hearing loss, mouth sores, nosebleeds, sore throat and trouble swallowing.    Eyes: Negative for photophobia and visual disturbance.   Respiratory: Negative for cough, chest tightness and shortness of breath.    Cardiovascular: Negative for chest pain, palpitations and leg swelling.        Elevated blood pressure in the a.m.   Gastrointestinal: Negative for abdominal distention, abdominal pain, blood in stool, constipation, diarrhea, nausea and vomiting.   Endocrine: Negative for cold intolerance and heat intolerance.   Genitourinary: Negative for decreased urine volume, difficulty urinating, dysuria, frequency, hematuria and urgency.   Musculoskeletal: Negative for arthralgias and gait problem.   Skin: Negative for rash and wound.   Neurological: Negative for dizziness, tremors, seizures, syncope, weakness, light-headedness, numbness and headaches.   Hematological: Negative for adenopathy. Does not bruise/bleed easily.        No bleeding.   Psychiatric/Behavioral: Negative for confusion and hallucinations. The patient is not nervous/anxious.    All other systems reviewed and are negative.     MEDICATIONS:    Scheduled Meds:  calcitriol, 0.25 mcg, Oral, Daily  cloNIDine, 0.3 mg, Oral, TID  [START ON 6/4/2021] famotidine, 10 mg, Oral, Daily  folic acid, 1 mg, Oral, Daily  losartan, 25 mg, Oral,  "Daily  predniSONE, 60 mg, Oral, Daily With Breakfast  sevelamer, 800 mg, Oral, TID With Meals  sodium bicarbonate, 650 mg, Oral, TID  sodium chloride, 10 mL, Intravenous, Q12H       Continuous Infusions:      PRN Meds:  •  acetaminophen **OR** acetaminophen **OR** acetaminophen  •  acetaminophen  •  albumin human  •  labetalol  •  melatonin  •  nitroglycerin  •  ondansetron **OR** ondansetron  •  pharmacy  •  sodium chloride     ALLERGIES:    Allergies   Allergen Reactions   • Heparin Other (See Comments)     Patient is thrombocytopenic.  Avoiding heparin at this time.       Objective    VITALS:   BP (!) 194/94 (BP Location: Left arm, Patient Position: Lying) Comment: nptify nurse  Pulse 66   Temp 98.1 °F (36.7 °C) (Oral)   Resp 22   Ht 162.6 cm (64\")   Wt 59.8 kg (131 lb 14.4 oz)   SpO2 97%   BMI 22.64 kg/m²     PHYSICAL EXAM:  Physical Exam  Vitals and nursing note reviewed.   Constitutional:       General: He is not in acute distress.     Appearance: Normal appearance. He is well-developed. He is not diaphoretic.   HENT:      Head: Normocephalic and atraumatic.      Right Ear: External ear normal.      Left Ear: External ear normal.      Nose: Nose normal.      Mouth/Throat:      Mouth: Mucous membranes are moist.      Dentition: Abnormal dentition (Dental fillings).      Pharynx: Oropharynx is clear. No oropharyngeal exudate or posterior oropharyngeal erythema.   Eyes:      General: No scleral icterus.     Extraocular Movements: Extraocular movements intact.      Conjunctiva/sclera: Conjunctivae normal.      Pupils: Pupils are equal, round, and reactive to light.      Comments: Wears corrective lenses.    Cardiovascular:      Rate and Rhythm: Normal rate and regular rhythm.      Heart sounds: Normal heart sounds. No murmur heard.     Pulmonary:      Effort: Pulmonary effort is normal. No respiratory distress.      Breath sounds: Normal breath sounds. No wheezing or rales.   Chest:      Comments: Luigi " catheter right chest wall.   Abdominal:      General: Bowel sounds are normal. There is no distension.      Palpations: Abdomen is soft. There is no mass.      Tenderness: There is no abdominal tenderness. There is no guarding.   Genitourinary:     Comments: Deferred   Musculoskeletal:         General: No swelling, tenderness or deformity. Normal range of motion.      Cervical back: Normal range of motion and neck supple.      Right lower leg: No edema.      Left lower leg: No edema.      Comments: Left upper extremity peripheral IV.   Lymphadenopathy:      Cervical: No cervical adenopathy.      Upper Body:      Right upper body: No supraclavicular adenopathy.      Left upper body: No supraclavicular adenopathy.   Skin:     General: Skin is warm and dry.      Coloration: Skin is not pale.      Findings: No bruising, erythema or rash.   Neurological:      General: No focal deficit present.      Mental Status: He is alert and oriented to person, place, and time.      Cranial Nerves: No cranial nerve deficit.      Coordination: Coordination normal.   Psychiatric:         Mood and Affect: Mood normal.         Behavior: Behavior normal.         Thought Content: Thought content normal.         RECENT LABS:  Lab Results (last 24 hours)     Procedure Component Value Units Date/Time    Copper, Serum [[id number]] Collected: 05/31/21 1352    Specimen: Blood Updated: 06/03/21 0509     Copper 101 ug/dL      Comment:                                     Detection Limit = 5                **Please note reference interval change**       Narrative:      Test(s) 001586-Copper, Serum or Plasma  was developed and its performance characteristics determined  by [facility]. It has not been cleared or approved by the Food  and Drug Administration.  Performed at:  [address]  [id number]  : Gaudencio Chester MD, Phone:  [phone]    Protein Elec + Interp, Serum [[id number]] Collected:  06/01/21 1343    Specimen: Blood Updated: 06/02/21 1611     Total Protein 7.7 g/dL      Albumin 4.2 g/dL      Alpha-1-Globulin 0.3 g/dL      Alpha-2-Globulin 0.7 g/dL      Beta Globulin 1.2 g/dL      Gamma Globulin 1.3 g/dL      M-Geovanni Not Observed g/dL      Globulin 3.5 g/dL      A/G Ratio 1.2     Please note Comment     Comment: Protein electrophoresis scan will follow via computer, mail, or   delivery.        SPE Interpretation Comment     Comment: The SPE pattern appears unremarkable. Evidence of monoclonal  protein is not apparent.       Narrative:      Performed at:  [address]  [id number]  : Jacky Ann PhD, Phone:  [phone]        PENDING RESULTS: HIT antibody, CBC (today)    IMAGING REVIEWED:  No radiology results for the last day    I have reviewed the patient's labs, imaging, reports, and other clinician documentation.    Assessment/Plan   ASSESSMENT  1. Thrombocytopenia: Acute and significantly low.  No overt bleeding.  LFTs and coags normal.  Only possible culprit drug is heparin with dialysis and folate deficiency.  Other possible etiologies immune or viral.  Thrombocytopenia work-up - CMV IgM and EBV Igm negative - remainder of work-up is pending. Started treatment with oral steroids on 5/31/2021 with improvement in platelet count noted initially but platelets now declining.  Received heparin  with dialysis on 6/1/2021. Orders given to pharmacy for no heparin.  2. Anemia/Folate deficiency anemia: Anemia probably chronic due to ESRD.  Anemia work-up revealed folate deficiency. Received Venofer and Retacrit on 5/31/2021 x1 dose with dialysis per nephrology.  Started on folate supplementation.  3. ESRD/Chronic kidney disease on chronic dialysis/Essential hypertension: On hemodialysis per nephrology. No heparin with HD due to thrombocytopenia. Needs emergency medicaid for outpatient HD.   4. Uninsured status:   consulted.     PLAN  1. Follow CBC - waiting for today's CBC to result.   2. Transfuse 1 unit SDP prn platelet count < 10,000 or with bleeding   3. Continue folic acid 1 mg p.o. once daily.  4. Continue prednisone 60 mg p.o. once daily  5. Continue Pepcid while on steroids.   6. Continue Retacrit and Venofer per nephrology with hemodialysis.  7. Continue to hold heparin with hemodialysis.   8. Continue empiric treatment with steroids.    Electronically signed by LAISHA Gastelum, 06/03/21, 10:39 AM EDT.      I have personally performed a face-to-face diagnostic evaluation on this patient.  I have discussed the case with Natacha Amaya NP, have edited/reviewed the note, and agree with the care plan.  Claims to get high blood pressure in the mornings.  On examination, he has right chest wall Shiley catheter.  Labs are significant for normal SPEP and copper level.  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  Await rest work-up results.    I discussed the patients findings and my recommendations with patient.    Electronically signed by Devan Bray MD, 06/03/21, 4:37 PM EDT.

## 2021-06-03 NOTE — CASE MANAGEMENT/SOCIAL WORK
Continued Stay Note   [place]oval     Patient Name: Josh Klein  MRN: [id number]  Today's Date: 6/3/2021    Admit Date: 5/29/2021    Discharge Plan     Row Name 06/03/21 1440       Plan    Plan Comments  Received call from [facility] representative who confirmed they have a referral for patient that is being worked up. Per rep,  at [facility] is working on case.  requested that [facility] contact this SW to discuss case, pending call. No return calls from care coordination department at [facility].     Phone communication or documentation only - no physical contact with patient or family.  JAKOB Trejo    Phone: [phone]  Cell: [phone]  Fax: [phone]  [email or web address]

## 2021-06-03 NOTE — CONSULTS
"Name: Josh Klein ADMIT: 2021   : 1970  PCP: Provider, No Known    MRN: [id number] LOS: 2 days   AGE/SEX: 50 y.o. male  ROOM: 201/   [facility]      Patient Care Team:  Provider, No Known as PCP - Devan Drew MD as Consulting Physician (Hematology and Oncology)  Chief Complaint   Patient presents with   • Hypotension     pt seen here on  to receive dialysis-pt without insurance and recently started receiving dialysis with the hospital, pt has not received dialysis since last . pt denies any pain, swelling or SOA     CC: Evaluation for hemodialysis access    Subjective     Inpatient Vascular Surgery Consult  Consult performed by: Blanca Hilton PA-C  Consult ordered by: Hitesh Streeter MD        History of Present Illness   Josh Klein is a 50-year-old male patient past medical history to include end-stage renal disease, anemia, thrombocytopenia and hypertension who reported to [facility] on 2021 stating \" I need dialysis\" and complains of weakness, dyspnea on exertion and low blood pressure.  Nephrology has asked us to see this patient for permanent hemodialysis access.  Patient interviewed with the assistance of an interpreting service via telephone.  Patient states he is feeling much better since receiving hemodialysis.  He reports that he was initiated on dialysis at the beginning of May.  He has been using a right-sided tunneled catheter for successful hemodialysis Tuesday, Thursday and Saturday, which was done at [facility].  He states due to insurance issues, he was unable to dialyze.  He had not dialyzed in several days when he presented to [facility].  Mr. Klein reports having had aVF creation at the beginning of May when he progressed to end-stage renal disease.  He denies having any symptoms consistent with steal syndrome.  He is unsure who created the aVF and has had no follow-up.    Review of " Systems   Constitutional: Positive for activity change and fatigue.   Respiratory: Negative for shortness of breath.    Cardiovascular: Negative for chest pain.   Gastrointestinal: Negative for abdominal pain.   Musculoskeletal: Negative for back pain.   Skin: Negative for wound.   All other systems reviewed and are negative.      Past Medical History:   Diagnosis Date   • History of renal dialysis    • Hypertension    • Renal disorder      Past Surgical History:   Procedure Laterality Date   • VASCULAR SURGERY      tunneled catheter     Family History   Problem Relation Age of Onset   • Kidney disease Mother      Social History     Tobacco Use   • Smoking status: Former Smoker   • Smokeless tobacco: Former User   Vaping Use   • Vaping Use: Never used   Substance Use Topics   • Alcohol use: Never   • Drug use: Never     Medications Prior to Admission   Medication Sig Dispense Refill Last Dose   • acetaminophen (TYLENOL) 325 MG tablet Take 650 mg by mouth Every 6 (Six) Hours As Needed for Mild Pain .      • calcitriol (ROCALTROL) 0.25 MCG capsule Take 0.25 mcg by mouth Daily.   5/29/2021 at 0800   • cloNIDine (CATAPRES) 0.3 MG tablet Take 0.3 mg by mouth 3 (Three) Times a Day.   5/29/2021 at 0800   • losartan (COZAAR) 25 MG tablet Take 25 mg by mouth Daily.   5/29/2021 at 0800   • NIFEdipine XL (PROCARDIA XL) 90 MG 24 hr tablet Take 90 mg by mouth Daily.   5/29/2021 at 0800   • sodium bicarbonate 650 MG tablet Take 1 tablet by mouth 3 (Three) Times a Day. 90 tablet 1 5/29/2021 at 0800   • sevelamer (RENAGEL) 800 MG tablet Take 800 mg by mouth 3 (Three) Times a Day With Meals.        calcitriol, 0.25 mcg, Oral, Daily  cloNIDine, 0.3 mg, Oral, TID  [START ON 6/4/2021] famotidine, 10 mg, Oral, Daily  folic acid, 1 mg, Oral, Daily  losartan, 25 mg, Oral, Daily  predniSONE, 60 mg, Oral, Daily With Breakfast  sevelamer, 800 mg, Oral, TID With Meals  sodium bicarbonate, 650 mg, Oral, TID  sodium chloride, 10 mL,  Intravenous, Q12H         •  acetaminophen **OR** acetaminophen **OR** acetaminophen  •  acetaminophen  •  albumin human  •  labetalol  •  melatonin  •  nitroglycerin  •  ondansetron **OR** ondansetron  •  pharmacy  •  sodium chloride  Heparin    Objective     Physical Exam:  Physical Exam  Constitutional:       Appearance: He is well-developed.   HENT:      Head: Normocephalic and atraumatic.   Eyes:      Pupils: Pupils are equal, round, and reactive to light.   Neck:      Trachea: No tracheal deviation.   Cardiovascular:      Rate and Rhythm: Normal rate and regular rhythm.      Comments: Patient has right radial cephalic AV fistula creation with strong thrill and bruit. Overall, incision line is healing well with a small healing scabbed area along the proximal line, noninfected.  Hand feels warm and well-perfused.  He has a right sided tunneled catheter in place.  Patient has left palpable radial pulse.  Bilateral femoral pulses palpable.  Bilateral pedal pulses palpable.  No ulcerations noted.  Pulmonary:      Effort: Pulmonary effort is normal. No respiratory distress.   Abdominal:      Palpations: Abdomen is soft. There is no mass.      Tenderness: There is no abdominal tenderness. There is no guarding.   Musculoskeletal:      Cervical back: Normal range of motion.   Skin:     General: Skin is warm and dry.   Neurological:      Mental Status: He is alert.          Vital Signs and Labs:  Vital Signs Patient Vitals for the past 24 hrs:   BP Temp Temp src Pulse Resp SpO2 Weight   06/03/21 1300 155/85 98.3 °F (36.8 °C) Oral 69 16 99 % --   06/03/21 1155 146/89 97.7 °F (36.5 °C) Oral 68 16 -- --   06/03/21 0352 (!) 194/94 98.1 °F (36.7 °C) Oral 66 22 97 % 59.8 kg (131 lb 14.4 oz)   06/02/21 1904 159/86 97.9 °F (36.6 °C) Oral 61 12 97 % --   06/02/21 1759 154/89 98.2 °F (36.8 °C) Oral 68 18 -- --     I/O:   Intake/Output Summary (Last 24 hours) at 6/3/2021 1405  Last data filed at 6/3/2021 1155  Gross per 24 hour    Intake 480 ml   Output 1000 ml   Net -520 ml     BMI:  Body mass index is 22.64 kg/m².    CBC    Results from last 7 days   Lab Units 06/03/21  1110 06/01/21  1756 06/01/21  0516 05/31/21  0856 05/30/21  0922 05/29/21  1342   WBC 10*3/mm3 20.10*  --  7.80 6.50 7.80 7.20   HEMOGLOBIN g/dL 11.2*  --  10.6* 10.4* 9.9* 10.3*   PLATELETS 10*3/mm3 36* 22* 56* 33* 92* 269     BMP   Results from last 7 days   Lab Units 06/01/21  0516 05/31/21  0731 05/30/21  0922 05/29/21  1342   SODIUM mmol/L 133* 132* 133* 135*   POTASSIUM mmol/L 4.6 3.9 4.5 4.7   CHLORIDE mmol/L 94* 93* 95* 95*   CO2 mmol/L 24.0 28.0 24.0 17.0*   BUN mg/dL 36* 17 28* 70*   CREATININE mg/dL 7.92* 5.40* 8.38* 15.26*   GLUCOSE mg/dL 129* 92 98 173*   MAGNESIUM mg/dL 2.2 1.8 2.0  --      Coag   Results from last 7 days   Lab Units 05/30/21  1015 05/29/21  1342   INR  0.97 0.93   APTT seconds 25.2  --      HbA1C   Lab Results   Component Value Date    HGBA1C 5.0 05/30/2021     Infection     Radiology(recent) No radiology results for the last day    Active Hospital Problems    Diagnosis  POA   • **ESRD (end stage renal disease) on dialysis (CMS/[facility]) [N18.6, Z99.2]  Not Applicable   • Thrombocytopenia (CMS/[facility]) [D69.6]  Yes   • Essential hypertension [I10]  Yes   • Anemia, chronic disease [D63.8]  Yes   • Essential hypertension [I10]  Yes   • Chronic kidney disease on chronic dialysis (CMS/[facility]) [N18.6, Z99.2]  Not Applicable      Resolved Hospital Problems    Diagnosis Date Resolved POA   • Essential hypertension [I10] 05/30/2021 Yes       [id number]    Assessment/Plan       ESRD (end stage renal disease) on dialysis (CMS/HCC)    Chronic kidney disease on chronic dialysis (CMS/HCC)    Essential hypertension    Anemia, chronic disease    Essential hypertension    Thrombocytopenia (CMS/HCC)      50 y.o. male who we have been asked to evaluate for permanent hemodialysis access in patient with right-sided tunneled catheter. He will be dialyzing in [place], due to  insurance.  Patient has a right radiocephalic AV fistula that was created approximately 4 to 5 weeks ago.  There is a strong thrill and bruit in the aVF.  Will obtain duplex to assess maturation, still may be too early to access.  Recommendations after scan.    Personal protective equipment used for this patient encounter:  Patient wearing surgical mask []    Provider wearing a surgical mask [x]    Gloves [x]    Eye protection []    Face Shield []    Gown []    N 95 respirator or CAPR/PAPR []   Duration of interaction 15 minutes    I discussed the patients findings and my recommendations with patient.    Blanca Santos PA-C  06/03/21  13:58 EDT    Please call my office with any question: [phone]

## 2021-06-03 NOTE — PROGRESS NOTES
[facility] Services Daily Progress Note      Hospitalist Team  LOS 2 days      Patient Care Team:  Provider, No Known as PCP - Devan Drew MD as Consulting Physician (Hematology and Oncology)    Patient Location: 201/1      Subjective   Subjective     Chief Complaint / Subjective  Chief Complaint   Patient presents with   • Hypotension     pt seen here on Sunday to receive dialysis-pt without insurance and recently started receiving dialysis with the hospital, pt has not received dialysis since last Sunday. pt denies any pain, swelling or SOA         Brief Synopsis of Hospital Course/HPI    Patient is a 50-year-old male with past medical history of anemia of chronic kidney disease, end-stage renal disease on hemodialysis and hypertension who presented to the emergency room because of hypotension.  Patient was recently in the hospital and completed his hemodialysis.  Patient is unable to get dialysis because of his lack of insurance.  Patient needs emergent dialysis and was recommended for admission for further treatment and management.  Nephrology consult was completed.  Hematology consult was completed because of thrombocytopenia.    Patient reported no new symptoms.      Date:: 6/3/2021.        Review of Systems   Constitutional: Negative.   HENT: Negative.    Eyes: Negative.    Cardiovascular: Negative.    Respiratory: Negative.    Endocrine: Negative.    Hematologic/Lymphatic: Negative.    Skin: Negative.    Musculoskeletal: Negative.    Gastrointestinal: Negative.    Genitourinary: Negative.    Neurological: Negative.    Psychiatric/Behavioral: Negative.    Allergic/Immunologic: Negative.          Objective   Objective      Vital Signs  Temp:  [97.7 °F (36.5 °C)-98.3 °F (36.8 °C)] 98.3 °F (36.8 °C)  Heart Rate:  [61-69] 69  Resp:  [12-22] 16  BP: (146-194)/(85-94) 155/85  Oxygen Therapy  SpO2: 99 %  Pulse Oximetry Type: Intermittent  Device (Oxygen Therapy):  "room air  Flowsheet Rows      First Filed Value   Admission Height  162.6 cm (64\") Documented at 05/29/2021 1252   Admission Weight  61.9 kg (136 lb 7.4 oz) Documented at 05/29/2021 1252        Intake & Output (last 3 days)       05/31 0701 - 06/01 0700 06/01 0701 - 06/02 0700 06/02 0701 - 06/03 0700 06/03 0701 - 06/04 0700    P.O. 1260 600 480     IV Piggyback        Total Intake(mL/kg) 1260 (21.3) 600 (10.2) 480 (8)     Urine (mL/kg/hr)    0 (0)    Other  2000  1000    Total Output  2000  1000    Net +1260 -1400 +480 -1000            Urine Unmeasured Occurrence 1 x   1 x        Lines, Drains & Airways    Active LDAs     Name:   Placement date:   Placement time:   Site:   Days:    CVC Double Lumen 05/06/21 Tunneled Right Subclavian   05/06/21    --    Subclavian   25    Peripheral IV 05/29/21 1340 Left Antecubital   05/29/21    1340    Antecubital   1                  Physical Exam:    Physical Exam  Vitals and nursing note reviewed.   Constitutional:       General: He is not in acute distress.     Comments: Patient is lying comfortably in bed.   HENT:      Head: Normocephalic.      Nose: Nose normal.      Mouth/Throat:      Mouth: Mucous membranes are dry.      Pharynx: Oropharynx is clear.   Eyes:      Extraocular Movements: Extraocular movements intact.      Conjunctiva/sclera: Conjunctivae normal.      Pupils: Pupils are equal, round, and reactive to light.   Cardiovascular:      Pulses: Normal pulses.      Heart sounds: No murmur heard.   No friction rub. No gallop.       Comments: S1 and S2 present.  No tachycardia.  Pulmonary:      Effort: Pulmonary effort is normal.      Breath sounds: No stridor. No wheezing or rales.   Chest:      Chest wall: No tenderness.   Abdominal:      General: Bowel sounds are normal. There is no distension.      Palpations: Abdomen is soft.      Tenderness: There is no abdominal tenderness. There is no right CVA tenderness or guarding.   Musculoskeletal:         General: No " swelling, tenderness, deformity or signs of injury.      Cervical back: Normal range of motion. No rigidity.      Right lower leg: No edema.      Left lower leg: No edema.   Skin:     General: Skin is warm and dry.      Capillary Refill: Capillary refill takes less than 2 seconds.      Coloration: Skin is not jaundiced.      Findings: No bruising, erythema, lesion or rash.   Neurological:      General: No focal deficit present.   Psychiatric:      Comments: No agitation.               Procedures:              Results Review:     I reviewed the patient's new clinical results.      Lab Results (last 24 hours)     Procedure Component Value Units Date/Time    CBC & Differential [[id number]]  (Abnormal) Collected: 06/03/21 1110    Specimen: Blood Updated: 06/03/21 1156    Narrative:      The following orders were created for panel order CBC & Differential.  Procedure                               Abnormality         Status                     ---------                               -----------         ------                     Scan Slide[[id number]]                                       Final result               CBC Auto Differential[[id number]]        Abnormal            Final result                 Please view results for these tests on the individual orders.    Scan Slide [[id number]] Collected: 06/03/21 1110    Specimen: Blood Updated: 06/03/21 1156     Anisocytosis Slight/1+     WBC Morphology Normal     Platelet Estimate Decreased     Large Platelets Slight/1+    Narrative:      Slide Reviewed      CBC Auto Differential [[id number]]  (Abnormal) Collected: 06/03/21 1110    Specimen: Blood Updated: 06/03/21 1156     WBC 20.10 10*3/mm3      RBC 3.65 10*6/mm3      Hemoglobin 11.2 g/dL      Hematocrit 33.9 %      MCV 92.8 fL      MCH 30.8 pg      MCHC 33.2 g/dL      RDW 14.7 %      RDW-SD 47.7 fl      MPV 8.7 fL      Platelets 36 10*3/mm3      Neutrophil % 77.9 %      Lymphocyte % 11.5 %      Monocyte % 9.7 %       Eosinophil % 0.5 %      Basophil % 0.4 %      Neutrophils, Absolute 15.70 10*3/mm3      Lymphocytes, Absolute 2.30 10*3/mm3      Monocytes, Absolute 1.90 10*3/mm3      Eosinophils, Absolute 0.10 10*3/mm3      Basophils, Absolute 0.10 10*3/mm3      nRBC 0.1 /100 WBC     Narrative:      Appended report. These results have been appended to a previously verified report.    Copper, Serum [[id number]] Collected: 05/31/21 1352    Specimen: Blood Updated: 06/03/21 0509     Copper 101 ug/dL      Comment:                                     Detection Limit = 5                **Please note reference interval change**       Narrative:      Test(s) 001586-Copper, Serum or Plasma  was developed and its performance characteristics determined  by [facility]. It has not been cleared or approved by the Food  and Drug Administration.  Performed at:  [address]  [id number]  : Gaudencio Chester MD, Phone:  [phone]        No results found for: HGBA1C  Results from last 7 days   Lab Units 05/30/21  1015 05/29/21  1342   INR  0.97 0.93           No results found for: LIPASE  No results found for: CHOL, CHLPL, TRIG, HDL, LDL, LDLDIRECT    No results found for: INTRAOP, PREDX, FINALDX, COMDX    Microbiology Results (last 10 days)     Procedure Component Value - Date/Time    COVID PRE-OP / PRE-PROCEDURE SCREENING ORDER (NO ISOLATION) - Swab, Nasopharynx [[id number]]  (Normal) Collected: 05/29/21 1634    Lab Status: Final result Specimen: Swab from Nasopharynx Updated: 05/29/21 1703    Narrative:      The following orders were created for panel order COVID PRE-OP / PRE-PROCEDURE SCREENING ORDER (NO ISOLATION) - Swab, Nasopharynx.  Procedure                               Abnormality         Status                     ---------                               -----------         ------                     COVID-19,CEPHEID,COR/DARRYN...[[id number]]  Normal              Final result                  Please view results for these tests on the individual orders.    COVID-19,CEPHEID,COR/DARRYN/PAD IN-HOUSE(OR EMERGENT/ADD-ON),NP SWAB IN TRANSPORT MEDIA 3-4 HR TAT, RT-PCR - Swab, Nasopharynx [[id number]]  (Normal) Collected: 05/29/21 1634    Lab Status: Final result Specimen: Swab from Nasopharynx Updated: 05/29/21 1703     COVID19 Not Detected    Narrative:      Fact sheet for providers: https://www.fda.gov/media/710314/download     Fact sheet for patients: https://www.fda.gov/media/697229/download  Fact sheet for providers: https://www.fda.gov/media/358388/download    Fact sheet for patients: https://www.fda.gov/media/677318/download    Test performed by PCR.          ECG/EMG Results (most recent)     Procedure Component Value Units Date/Time    ECG 12 Lead [[id number]] Collected: 05/29/21 1333     Updated: 05/30/21 0821     QT Interval 413 ms     Narrative:      HEART RATE= 70  bpm  RR Interval= 864  ms  NJ Interval= 199  ms  P Horizontal Axis= -29  deg  P Front Axis= 47  deg  QRSD Interval= 110  ms  QT Interval= 413  ms  QRS Axis= -48  deg  T Wave Axis= 45  deg  - BORDERLINE ECG -  Sinus rhythm  Probable left ventricular hypertrophy  When compared with ECG of 23-May-2021 12:49:50,  Significant repolarization change  Electronically Signed By: Román Mcnair (Darryn) 30-May-2021 08:20:51  Date and Time of Study: 2021-05-29 13:33:44                  XR Chest 1 View    Result Date: 5/29/2021  No acute cardiopulmonary abnormality or significant change.  Electronically Signed By-Iesha Winter MD On:5/29/2021 3:19 PM This report was finalized on [id number] by  Iesha Winter MD.          Xrays, labs reviewed personally by physician.    Medication Review:   I have reviewed the patient's current medication list      Scheduled Meds  calcitriol, 0.25 mcg, Oral, Daily  cloNIDine, 0.3 mg, Oral, TID  [START ON 6/4/2021] famotidine, 10 mg, Oral, Daily  folic acid, 1 mg, Oral, Daily  losartan, 25 mg, Oral, Daily  predniSONE,  60 mg, Oral, Daily With Breakfast  sevelamer, 800 mg, Oral, TID With Meals  sodium bicarbonate, 650 mg, Oral, TID  sodium chloride, 10 mL, Intravenous, Q12H        Meds Infusions       Meds PRN  •  acetaminophen **OR** acetaminophen **OR** acetaminophen  •  acetaminophen  •  albumin human  •  labetalol  •  melatonin  •  nitroglycerin  •  ondansetron **OR** ondansetron  •  pharmacy  •  sodium chloride        Assessment/Plan   Assessment/Plan     Active Hospital Problems    Diagnosis  POA   • **ESRD (end stage renal disease) on dialysis (CMS/[facility]) [N18.6, Z99.2]  Not Applicable     Priority: High  Follow nephrology recommendations.  Continue hemodialysis.     • Anemia, chronic disease [D63.8]  Yes     Priority: Medium  Monitor H&H.    Thrombocytopenia  Follow hematology oncology recommendations.     • Essential hypertension [I10]  Yes     Priority: Medium  Treat with Catapres.     • Essential hypertension [I10]  Yes   • Chronic kidney disease on chronic dialysis (CMS/[facility]) [N18.6, Z99.2]  Follow nephrology recommendations.      Thrombocytopenia.  Follow heme-onc recommendations.    DVT prophylaxis   -Heparin and SCDs.      Not Applicable      Resolved Hospital Problems    Diagnosis Date Resolved POA      Yes       MEDICAL DECISION MAKING COMPLEXITY BY PROBLEM:     Continue appropriate patient's home medications for other chronic medical conditions.  Continue the present level of care.  Patient and family agreed with the plan of care.          VTE Prophylaxis -   Mechanical Order History:      Ordered        05/30/21 1432  Place Sequential Compression Device  Once         05/30/21 1432  Maintain Sequential Compression Device  Continuous                 Pharmalogical Order History:      Ordered     Dose Route Frequency Stop    05/29/21 1710  heparin (porcine) 5000 UNIT/ML injection 5,000 Units  Status:  Discontinued      5,000 Units SC Every 12 Hours Scheduled 05/30/21 1432                  Code Status -   Code Status and  Medical Interventions:   Ordered at: 05/29/21 1628     Code Status:    CPR     Medical Interventions (Level of Support Prior to Arrest):    Full       This patient has been examined wearing appropriate Personal Protective Equipment and discussed with hospital infection control department, [facility] department, infectious disease specialist and pulmonologist. 06/03/21        Discharge Planning  Pending patient's clinical improvement.        Electronically signed by Benjy Nayak MD, 06/03/21, 14:50 EDT.  [facility] Hospitalist Team

## 2021-06-03 NOTE — CASE MANAGEMENT/SOCIAL WORK
Continued Stay Note  TREVON Nick     Patient Name: Josh Klein  MRN: [id number]  Today's Date: 6/3/2021    Admit Date: 5/29/2021    Discharge Plan     Row Name 06/03/21 1018       Plan    Plan  [place] Plan: Return home with brother. [facility] or [facility] requested for dialysis.  Awaiting chair time.    Plan Comments  Received call from Racheal at [facility] ([phone] direct line) regarding patient insurability assessment form submission.  Racheal said patient has been clinically accepted, and they are just in need of completed form.  Assured her that as soon as information was retrieved that it would be faxed to number provided on form.  Met with patient in HD unit to discuss form and retrieved information.  Faxed completed form through [employer] to [phone].            Expected Discharge Date and Time     Expected Discharge Date Expected Discharge Time    Jun 5, 2021             Swathi Nugent RN

## 2021-06-03 NOTE — CASE MANAGEMENT/SOCIAL WORK
Continued Stay Note  TREVON Nick     Patient Name: Josh Klein  MRN: [id number]  Today's Date: 6/3/2021    Admit Date: 5/29/2021    Discharge Plan     Row Name 06/03/21 1531       Plan    Plan Comments  Left VM for Racheal's direct line at [facility] at [phone] to inquire if she received patient insurability form through fax, awaiting reply.    Row Name 06/03/21 3547       Plan    Plan Comments  Received call from [facility] representative who confirmed they have a referral for patient that is being worked up. Per rep,  at [facility] is working on case. SW requested that [facility] SW contact this SW to discuss case, pending call. No return calls from care coordination department at [facility].            Expected Discharge Date and Time     Expected Discharge Date Expected Discharge Time    Jun 5, 2021             Swathi Nugent RN

## 2021-06-03 NOTE — PROGRESS NOTES
"RENAL/KCC:     LOS: 2 days    Patient Care Team:  Provider, No Known as PCP - Devan Drew MD as Consulting Physician (Hematology and Oncology)    Chief Complaint:  ESRD    Subjective     Interval History:   Chart reviewed.  S/P HD today.  BP stable.  Denies any CP or SOA.    Objective     Vital Sign Min/Max for last 24 hours  Temp  Min: 97.7 °F (36.5 °C)  Max: 98.2 °F (36.8 °C)   BP  Min: 146/89  Max: 194/94   Pulse  Min: 61  Max: 68   Resp  Min: 12  Max: 22   SpO2  Min: 97 %  Max: 97 %   No data recorded   Weight  Min: 59.8 kg (131 lb 14.4 oz)  Max: 59.8 kg (131 lb 14.4 oz)     Flowsheet Rows      First Filed Value   Admission Height  162.6 cm (64\") Documented at 05/29/2021 1252   Admission Weight  61.9 kg (136 lb 7.4 oz) Documented at 05/29/2021 1252          I/O this shift:  In: -   Out: 1000 [Other:1000]  I/O last 3 completed shifts:  In: 840 [P.O.:840]  Out: -     Physical Exam:  GEN: Awake, NAD  ENT: PERRL, EOMI, MMM  NECK: Supple, no JVD  CHEST: CTAB, no W/R/C  CV: RRR, no M/G/R  ABD: Soft, NT, +BS  SKIN: Warm and Dry  NEURO: CN's intact      WBC WBC   Date Value Ref Range Status   06/03/2021 20.10 (H) 3.40 - 10.80 10*3/mm3 Final   06/01/2021 7.80 3.40 - 10.80 10*3/mm3 Final      HGB Hemoglobin   Date Value Ref Range Status   06/03/2021 11.2 (L) 13.0 - 17.7 g/dL Final   06/01/2021 10.6 (L) 13.0 - 17.7 g/dL Final      HCT Hematocrit   Date Value Ref Range Status   06/03/2021 33.9 (L) 37.5 - 51.0 % Final   06/01/2021 31.2 (L) 37.5 - 51.0 % Final      Platlets No results found for: LABPLAT   MCV MCV   Date Value Ref Range Status   06/03/2021 92.8 79.0 - 97.0 fL Final   06/01/2021 93.7 79.0 - 97.0 fL Final          Sodium Sodium   Date Value Ref Range Status   06/01/2021 133 (L) 136 - 145 mmol/L Final      Potassium Potassium   Date Value Ref Range Status   06/01/2021 4.6 3.5 - 5.2 mmol/L Final      Chloride Chloride   Date Value Ref Range Status   06/01/2021 94 (L) 98 - 107 mmol/L Final      CO2 " CO2   Date Value Ref Range Status   06/01/2021 24.0 22.0 - 29.0 mmol/L Final      BUN BUN   Date Value Ref Range Status   06/01/2021 36 (H) 6 - 20 mg/dL Final     Comment:     Result checked       Creatinine Creatinine   Date Value Ref Range Status   06/01/2021 7.92 (H) 0.76 - 1.27 mg/dL Final      Calcium Calcium   Date Value Ref Range Status   06/01/2021 8.7 8.6 - 10.5 mg/dL Final      PO4 No results found for: CAPO4   Albumin Albumin   Date Value Ref Range Status   06/01/2021 4.2 2.9 - 4.4 g/dL Final      Magnesium Magnesium   Date Value Ref Range Status   06/01/2021 2.2 1.6 - 2.6 mg/dL Final      Uric Acid No results found for: URICACID        Results Review:     I reviewed the patient's new clinical results.    calcitriol, 0.25 mcg, Oral, Daily  cloNIDine, 0.3 mg, Oral, TID  [START ON 6/4/2021] famotidine, 10 mg, Oral, Daily  folic acid, 1 mg, Oral, Daily  losartan, 25 mg, Oral, Daily  predniSONE, 60 mg, Oral, Daily With Breakfast  sevelamer, 800 mg, Oral, TID With Meals  sodium bicarbonate, 650 mg, Oral, TID  sodium chloride, 10 mL, Intravenous, Q12H           Medication Review: Reviewed    Assessment/Plan       ESRD (end stage renal disease) on dialysis (CMS/HCC)    Chronic kidney disease on chronic dialysis (CMS/HCC)    Essential hypertension    Anemia, chronic disease    Essential hypertension    Thrombocytopenia (CMS/HCC)      Plan: Continue HD TTS.  Working on an outpatient HD spot.  Continue current BP meds.  No heparin with dialysis.  Hematology following for TCP work-up and management.  Social work following.      Hitesh Streeter MD   [facility]  06/03/21  12:35 EDT

## 2021-06-03 NOTE — PLAN OF CARE
Goal Outcome Evaluation:  Plan of Care Reviewed With: patient  Progress: improvingpatient has been very pleasant and has not had any complaints. Rested in bed the entire shift

## 2021-06-03 NOTE — PLAN OF CARE
Goal Outcome Evaluation:  Plan of Care Reviewed With: patient  Progress: improving  Outcome Summary: plan to go home with [facility]-W[facility] at discharge    Pt A&O. Dialysis completed this morning. Pt pleasant without any c/o voiced at this time. Will cont to monitor.

## 2021-06-04 LAB
BASOPHILS # BLD AUTO: 0.1 10*3/MM3 (ref 0–0.2)
BASOPHILS NFR BLD AUTO: 0.6 % (ref 0–1.5)
DEPRECATED RDW RBC AUTO: 49.9 FL (ref 37–54)
EOSINOPHIL # BLD AUTO: 0.5 10*3/MM3 (ref 0–0.4)
EOSINOPHIL NFR BLD AUTO: 4.4 % (ref 0.3–6.2)
ERYTHROCYTE [DISTWIDTH] IN BLOOD BY AUTOMATED COUNT: 15.1 % (ref 12.3–15.4)
HCT VFR BLD AUTO: 33.8 % (ref 37.5–51)
HGB BLD-MCNC: 11.2 G/DL (ref 13–17.7)
LYMPHOCYTES # BLD AUTO: 3.4 10*3/MM3 (ref 0.7–3.1)
LYMPHOCYTES NFR BLD AUTO: 30.2 % (ref 19.6–45.3)
MCH RBC QN AUTO: 30.9 PG (ref 26.6–33)
MCHC RBC AUTO-ENTMCNC: 33 G/DL (ref 31.5–35.7)
MCV RBC AUTO: 93.7 FL (ref 79–97)
MONOCYTES # BLD AUTO: 1.4 10*3/MM3 (ref 0.1–0.9)
MONOCYTES NFR BLD AUTO: 12.3 % (ref 5–12)
NEUTROPHILS NFR BLD AUTO: 52.5 % (ref 42.7–76)
NEUTROPHILS NFR BLD AUTO: 6 10*3/MM3 (ref 1.7–7)
NRBC BLD AUTO-RTO: 0.2 /100 WBC (ref 0–0.2)
PLATELET # BLD AUTO: 68 10*3/MM3 (ref 140–450)
PMV BLD AUTO: 10.7 FL (ref 6–12)
RBC # BLD AUTO: 3.61 10*6/MM3 (ref 4.14–5.8)
WBC # BLD AUTO: 11.4 10*3/MM3 (ref 3.4–10.8)

## 2021-06-04 PROCEDURE — 85025 COMPLETE CBC W/AUTO DIFF WBC: CPT | Performed by: NURSE PRACTITIONER

## 2021-06-04 PROCEDURE — 63710000001 PREDNISONE PER 1 MG: Performed by: INTERNAL MEDICINE

## 2021-06-04 PROCEDURE — 99233 SBSQ HOSP IP/OBS HIGH 50: CPT | Performed by: INTERNAL MEDICINE

## 2021-06-04 RX ORDER — PREDNISONE 20 MG/1
40 TABLET ORAL
Status: DISCONTINUED | OUTPATIENT
Start: 2021-06-04 | End: 2021-06-05

## 2021-06-04 RX ORDER — CLONIDINE HYDROCHLORIDE 0.1 MG/1
0.1 TABLET ORAL 3 TIMES DAILY
Status: DISCONTINUED | OUTPATIENT
Start: 2021-06-04 | End: 2021-06-07

## 2021-06-04 RX ADMIN — CLONIDINE HYDROCHLORIDE 0.1 MG: 0.1 TABLET ORAL at 09:04

## 2021-06-04 RX ADMIN — LOSARTAN POTASSIUM 25 MG: 25 TABLET, FILM COATED ORAL at 09:06

## 2021-06-04 RX ADMIN — CLONIDINE HYDROCHLORIDE 0.1 MG: 0.1 TABLET ORAL at 17:15

## 2021-06-04 RX ADMIN — Medication 10 ML: at 20:58

## 2021-06-04 RX ADMIN — SEVELAMER CARBONATE 800 MG: 800 TABLET, FILM COATED ORAL at 17:15

## 2021-06-04 RX ADMIN — SEVELAMER CARBONATE 800 MG: 800 TABLET, FILM COATED ORAL at 12:50

## 2021-06-04 RX ADMIN — SODIUM BICARBONATE 650 MG: 650 TABLET ORAL at 17:15

## 2021-06-04 RX ADMIN — Medication 10 ML: at 09:06

## 2021-06-04 RX ADMIN — SODIUM BICARBONATE 650 MG: 650 TABLET ORAL at 20:58

## 2021-06-04 RX ADMIN — SODIUM BICARBONATE 650 MG: 650 TABLET ORAL at 09:04

## 2021-06-04 RX ADMIN — CLONIDINE HYDROCHLORIDE 0.1 MG: 0.1 TABLET ORAL at 20:58

## 2021-06-04 RX ADMIN — CALCITRIOL CAPSULES 0.25 MCG 0.25 MCG: 0.25 CAPSULE ORAL at 09:04

## 2021-06-04 RX ADMIN — SEVELAMER CARBONATE 800 MG: 800 TABLET, FILM COATED ORAL at 09:04

## 2021-06-04 RX ADMIN — FOLIC ACID 1 MG: 1 TABLET ORAL at 09:04

## 2021-06-04 RX ADMIN — PREDNISONE 40 MG: 20 TABLET ORAL at 09:04

## 2021-06-04 RX ADMIN — FAMOTIDINE 10 MG: 20 TABLET, FILM COATED ORAL at 09:04

## 2021-06-04 NOTE — PROGRESS NOTES
Hematology/Oncology Inpatient Progress Note    PATIENT NAME: Josh Klein  : 1970  MRN: [id number]    CHIEF COMPLAINT: Acute thrombocytopenia    HISTORY OF PRESENT ILLNESS:    50 y.o. male presented to [facility] on 2021 reporting a need for hemodialysis.  He is an undocumented immigrant and has not been able to obtain insurance to receive hemodialysis as an outpatient.  He reported weakness dyspnea on exertion and a low blood pressure for the past 24 hours.  His chest x-ray was unremarkable. WBC 7.2, hemoglobin 10.3, MCV 94.2 and platelets 269,000.  His platelets started declining the following day, and were 33,000 on 2021.  PT 10.7 (9.6-11.7, PTT 25.2 (24-31), vitamin B12 424 (211-946), and LFTs were normal. According to the medical record, he was diagnosed with end-stage renal disease at [facility] on 2021 and was started on hemodialysis.  Since that time he has been unable to obtain insurance coverage for treatment of his renal disease.     21  Hematology/Oncology was consulted by Dr. Nayak for acute thrombocytopenia.  He denied any history of bleeding or low blood counts.     Past Medical History: End-stage renal disease and hypertension.  Surgical History:  Vascular surgery.  Social History: He lives in [place], [place] with his brother's family.  He has worked in construction.  He does not smoke or use alcohol or other recreational drugs.  Family History: His mother had hypertension.  Allergies: No known allergies.     PCP: Provider, No Known     INTERVAL HISTORY:  • 2021 -White blood count 7.8, hemoglobin 10.6, MCV 93.7, platelets 56,000. Folate 4.34 (4.78-24.20), iron 68 (), iron saturation 21 (20-50) transferrin 215 (200-360), TIBC 320 (298-536), reticulocytes 0.55 (0.7-1.9), haptoglobin 122 (). Started folic acid 1 mg p.o. once daily.  Received Venofer 200 mg IV and Retacrit 30,000 units subcu x1 dose per nephrology on 2021 with  hemodialysis.  Received heparin on dialysis.  • 6/2/2021- On 6/1/2021 1848 platelets 22,000, CMV IgM <30.0 (<30.0), erythropoietin 9.4 (2.6-18.5).   · 6/3/2021 -  EBV IgM <36.0 (<36.0), copper 101 (), and SPEP -no M spike or monoclonal protein is not present.   · 6/4/2021-platelet count 68,000.  HIT 0.133 (0-0.4).    History of present illness reviewed since last visit and changes noted on 06/04/21.    Subjective     He feels fine.     ROS:  Review of Systems   Constitutional: Negative for activity change, chills, fatigue, fever and unexpected weight change.   HENT: Negative for congestion, dental problem, drooling, hearing loss, mouth sores, nosebleeds, sore throat and trouble swallowing.    Eyes: Negative for photophobia and visual disturbance.   Respiratory: Negative for cough, chest tightness and shortness of breath.    Cardiovascular: Negative for chest pain, palpitations and leg swelling.        Elevated blood pressure in the a.m.   Gastrointestinal: Negative for abdominal distention, abdominal pain, blood in stool, constipation, diarrhea, nausea and vomiting.   Endocrine: Negative for cold intolerance, heat intolerance and polydipsia.   Genitourinary: Negative for decreased urine volume, difficulty urinating, dysuria, frequency, hematuria and urgency.   Musculoskeletal: Negative for arthralgias and gait problem.   Skin: Negative for rash and wound.   Neurological: Negative for dizziness, tremors, seizures, syncope, weakness, light-headedness, numbness and headaches.   Hematological: Negative for adenopathy. Does not bruise/bleed easily.        No bleeding.   Psychiatric/Behavioral: Negative for confusion and hallucinations. The patient is not nervous/anxious.    All other systems reviewed and are negative.     MEDICATIONS:    Scheduled Meds:  calcitriol, 0.25 mcg, Oral, Daily  cloNIDine, 0.3 mg, Oral, TID  famotidine, 10 mg, Oral, Daily  folic acid, 1 mg, Oral, Daily  losartan, 25 mg, Oral,  "Daily  predniSONE, 60 mg, Oral, Daily With Breakfast  sevelamer, 800 mg, Oral, TID With Meals  sodium bicarbonate, 650 mg, Oral, TID  sodium chloride, 10 mL, Intravenous, Q12H       Continuous Infusions:      PRN Meds:  •  acetaminophen **OR** acetaminophen **OR** acetaminophen  •  acetaminophen  •  labetalol  •  melatonin  •  nitroglycerin  •  ondansetron **OR** ondansetron  •  pharmacy  •  sodium chloride     ALLERGIES:    Allergies   Allergen Reactions   • Heparin Other (See Comments)     Patient is thrombocytopenic.  Avoiding heparin at this time.       Objective    VITALS:   BP 98/61 (BP Location: Left arm, Patient Position: Lying)   Pulse 67   Temp 97.5 °F (36.4 °C) (Oral)   Resp 18   Ht 162.6 cm (64\")   Wt 61.8 kg (136 lb 3.9 oz)   SpO2 97%   BMI 23.39 kg/m²     PHYSICAL EXAM:  Physical Exam  Vitals and nursing note reviewed.   Constitutional:       General: He is not in acute distress.     Appearance: Normal appearance. He is well-developed. He is not diaphoretic.   HENT:      Head: Normocephalic and atraumatic.      Right Ear: External ear normal.      Left Ear: External ear normal.      Nose: Nose normal.      Mouth/Throat:      Mouth: Mucous membranes are moist.      Dentition: Abnormal dentition (Dental fillings).      Pharynx: Oropharynx is clear. No oropharyngeal exudate or posterior oropharyngeal erythema.      Comments: Dental fillings  Eyes:      General: No scleral icterus.     Extraocular Movements: Extraocular movements intact.      Conjunctiva/sclera: Conjunctivae normal.      Pupils: Pupils are equal, round, and reactive to light.      Comments: Wears corrective lenses.    Cardiovascular:      Rate and Rhythm: Normal rate and regular rhythm.      Heart sounds: Normal heart sounds. No murmur heard.        Comments: Cardiac monitor leads.    Pulmonary:      Effort: Pulmonary effort is normal. No respiratory distress.      Breath sounds: Normal breath sounds. No wheezing or rales.   Chest: "      Comments: Shiley catheter right chest wall.   Abdominal:      General: Bowel sounds are normal. There is no distension.      Palpations: Abdomen is soft. There is no mass.      Tenderness: There is no abdominal tenderness. There is no guarding.   Genitourinary:     Comments: Deferred   Musculoskeletal:         General: No swelling, tenderness or deformity. Normal range of motion.      Cervical back: Normal range of motion and neck supple.      Right lower leg: No edema.      Left lower leg: No edema.      Comments: Left upper extremity peripheral IV.   Lymphadenopathy:      Cervical: No cervical adenopathy.      Upper Body:      Right upper body: No supraclavicular adenopathy.      Left upper body: No supraclavicular adenopathy.   Skin:     General: Skin is warm and dry.      Coloration: Skin is not pale.      Findings: No bruising, erythema or rash.   Neurological:      General: No focal deficit present.      Mental Status: He is alert and oriented to person, place, and time.      Cranial Nerves: No cranial nerve deficit.      Coordination: Coordination normal.   Psychiatric:         Mood and Affect: Mood normal.         Behavior: Behavior normal.         Thought Content: Thought content normal.         RECENT LABS:  Lab Results (last 24 hours)     Procedure Component Value Units Date/Time    CBC & Differential [[id number]]  (Abnormal) Collected: 06/04/21 0255    Specimen: Blood Updated: 06/04/21 0350    Narrative:      The following orders were created for panel order CBC & Differential.  Procedure                               Abnormality         Status                     ---------                               -----------         ------                     CBC Auto Differential[[id number]]        Abnormal            Final result                 Please view results for these tests on the individual orders.    CBC Auto Differential [[id number]]  (Abnormal) Collected: 06/04/21 0255    Specimen: Blood Updated:  06/04/21 0350     WBC 11.40 10*3/mm3      RBC 3.61 10*6/mm3      Hemoglobin 11.2 g/dL      Hematocrit 33.8 %      MCV 93.7 fL      MCH 30.9 pg      MCHC 33.0 g/dL      RDW 15.1 %      RDW-SD 49.9 fl      MPV 10.7 fL      Platelets 68 10*3/mm3      Comment: Result checked         Neutrophil % 52.5 %      Lymphocyte % 30.2 %      Monocyte % 12.3 %      Eosinophil % 4.4 %      Basophil % 0.6 %      Neutrophils, Absolute 6.00 10*3/mm3      Lymphocytes, Absolute 3.40 10*3/mm3      Monocytes, Absolute 1.40 10*3/mm3      Eosinophils, Absolute 0.50 10*3/mm3      Basophils, Absolute 0.10 10*3/mm3      nRBC 0.2 /100 WBC     Heparin-induced Platelet Antibody [[id number]] Collected: 05/31/21 1352    Specimen: Blood Updated: 06/03/21 1611     Heparin Induced Plt Ab 0.133 OD     Narrative:      Performed at:  [address] [place] [address]  [id number]  : Gaudencio Chester MD, Phone:  [phone]    CBC & Differential [[id number]]  (Abnormal) Collected: 06/03/21 1110    Specimen: Blood Updated: 06/03/21 1156    Narrative:      The following orders were created for panel order CBC & Differential.  Procedure                               Abnormality         Status                     ---------                               -----------         ------                     Scan Slide[[id number]]                                       Final result               CBC Auto Differential[[id number]]        Abnormal            Final result                 Please view results for these tests on the individual orders.    Scan Slide [[id number]] Collected: 06/03/21 1110    Specimen: Blood Updated: 06/03/21 1156     Anisocytosis Slight/1+     WBC Morphology Normal     Platelet Estimate Decreased     Large Platelets Slight/1+    Narrative:      Slide Reviewed      CBC Auto Differential [[id number]]  (Abnormal) Collected: 06/03/21 1110    Specimen: Blood Updated: 06/03/21 1156     WBC 20.10 10*3/mm3      RBC 3.65 10*6/mm3       Hemoglobin 11.2 g/dL      Hematocrit 33.9 %      MCV 92.8 fL      MCH 30.8 pg      MCHC 33.2 g/dL      RDW 14.7 %      RDW-SD 47.7 fl      MPV 8.7 fL      Platelets 36 10*3/mm3      Neutrophil % 77.9 %      Lymphocyte % 11.5 %      Monocyte % 9.7 %      Eosinophil % 0.5 %      Basophil % 0.4 %      Neutrophils, Absolute 15.70 10*3/mm3      Lymphocytes, Absolute 2.30 10*3/mm3      Monocytes, Absolute 1.90 10*3/mm3      Eosinophils, Absolute 0.10 10*3/mm3      Basophils, Absolute 0.10 10*3/mm3      nRBC 0.1 /100 WBC     Narrative:      Appended report. These results have been appended to a previously verified report.        PENDING RESULTS: n/a    IMAGING REVIEWED:  No radiology results for the last day    I have reviewed the patient's labs, imaging, reports, and other clinician documentation.    Assessment/Plan   ASSESSMENT  1. Thrombocytopenia: Acute and significantly low.  No overt bleeding.  LFTs and coags normal.  Only possible culprit drug is heparin with dialysis and folate deficiency.  Other possible etiologies immune or viral.  Thrombocytopenia work-up - CMV IgM and EBV Igm negative. Started treatment with oral steroids on 5/31/2021 with improvement in platelet count noted initially but platelets declined after dialysis with use of heparin.  Received heparin  with dialysis on 6/1/2021. Orders given to pharmacy for no heparin.  Heparin antibodies negative but course compatible with heparin-induced thrombocytopenia.  2. Anemia/Folate deficiency anemia: Anemia probably chronic due to ESRD.  Anemia work-up revealed folate deficiency. Received Venofer and Retacrit on 5/31/2021 x1 dose with dialysis per nephrology.  Started on folate supplementation.  3. ESRD/Chronic kidney disease on chronic dialysis/Essential hypertension: On hemodialysis per nephrology. No heparin with HD due to thrombocytopenia. Needs emergency medicaid for outpatient HD.   4. Uninsured status:  consulted.     PLAN  1. Follow  CBC.   2. Transfuse 1 unit SDP prn platelet count < 10,000 or with bleeding   3. Continue folic acid 1 mg p.o. once daily.  4. Start tapering prednisone  5. Continue Pepcid while on steroids.   6. Continue Retacrit and Venofer per nephrology with hemodialysis.  7. Continue to hold heparin with hemodialysis.               I discussed the patients findings and my recommendations with patient.    Electronically signed by Devan Bray MD, 06/04/21, 7:53 AM EDT.

## 2021-06-04 NOTE — PROGRESS NOTES
"RENAL/KCC:     LOS: 3 days    Patient Care Team:  Provider, No Known as PCP - Devan Drew MD as Consulting Physician (Hematology and Oncology)    Chief Complaint:  ESRD    Subjective     Interval History:   Chart reviewed.  BP has been low at times.  Denies any CP or SOA.    Objective     Vital Sign Min/Max for last 24 hours  Temp  Min: 97.5 °F (36.4 °C)  Max: 98.3 °F (36.8 °C)   BP  Min: 98/61  Max: 155/85   Pulse  Min: 67  Max: 80   Resp  Min: 16  Max: 20   SpO2  Min: 97 %  Max: 99 %   No data recorded   Weight  Min: 61.8 kg (136 lb 3.9 oz)  Max: 61.9 kg (136 lb 6.4 oz)     Flowsheet Rows      First Filed Value   Admission Height  162.6 cm (64\") Documented at 05/29/2021 1252   Admission Weight  61.9 kg (136 lb 7.4 oz) Documented at 05/29/2021 1252          No intake/output data recorded.  I/O last 3 completed shifts:  In: 720 [P.O.:720]  Out: 1000 [Other:1000]    Physical Exam:  GEN: Awake, NAD  ENT: PERRL, EOMI, MMM  NECK: Supple, no JVD  CHEST: CTAB, no W/R/C  CV: RRR, no M/G/R  ABD: Soft, NT, +BS  SKIN: Warm and Dry  NEURO: CN's intact      WBC WBC   Date Value Ref Range Status   06/04/2021 11.40 (H) 3.40 - 10.80 10*3/mm3 Final   06/03/2021 20.10 (H) 3.40 - 10.80 10*3/mm3 Final      HGB Hemoglobin   Date Value Ref Range Status   06/04/2021 11.2 (L) 13.0 - 17.7 g/dL Final   06/03/2021 11.2 (L) 13.0 - 17.7 g/dL Final      HCT Hematocrit   Date Value Ref Range Status   06/04/2021 33.8 (L) 37.5 - 51.0 % Final   06/03/2021 33.9 (L) 37.5 - 51.0 % Final      Platlets No results found for: LABPLAT   MCV MCV   Date Value Ref Range Status   06/04/2021 93.7 79.0 - 97.0 fL Final   06/03/2021 92.8 79.0 - 97.0 fL Final          Sodium No results found for: NA   Potassium No results found for: K   Chloride No results found for: CL   CO2 No results found for: CO2   BUN No results found for: BUN   Creatinine No results found for: CREATININE   Calcium No results found for: CALCIUM   PO4 No results found for: " CAPO4   Albumin Albumin   Date Value Ref Range Status   06/01/2021 4.2 2.9 - 4.4 g/dL Final      Magnesium No results found for: MG   Uric Acid No results found for: URICACID        Results Review:     I reviewed the patient's new clinical results.    calcitriol, 0.25 mcg, Oral, Daily  cloNIDine, 0.1 mg, Oral, TID  famotidine, 10 mg, Oral, Daily  folic acid, 1 mg, Oral, Daily  losartan, 25 mg, Oral, Daily  predniSONE, 40 mg, Oral, Daily With Breakfast  sevelamer, 800 mg, Oral, TID With Meals  sodium bicarbonate, 650 mg, Oral, TID  sodium chloride, 10 mL, Intravenous, Q12H           Medication Review: Reviewed    Assessment/Plan       ESRD (end stage renal disease) on dialysis (CMS/[facility])    Chronic kidney disease on chronic dialysis (CMS/[facility])    Essential hypertension    Anemia, chronic disease    Essential hypertension    Thrombocytopenia (CMS/[facility])      Plan: Continue HD TTS.  Working on an outpatient HD spot.  Decrease Clonidine as BP low at times.  No heparin with dialysis.  Hematology following for TCP work-up and management.  Social work following.      Hitesh Streeter MD   [facility]  06/04/21  09:00 EDT

## 2021-06-04 NOTE — PROGRESS NOTES
Name: Josh Klein ADMIT: 2021   : 1970  PCP: Provider, No Known    MRN: [id number] LOS: 3 days   AGE/SEX: 50 y.o. male  ROOM: [facility]    [place], Subsequent Hospital Care    Chief Complaint   Patient presents with   • Hypotension     pt seen here on  to receive dialysis-pt without insurance and recently started receiving dialysis with the hospital, pt has not received dialysis since last . pt denies any pain, swelling or SOA     CC: AV fistula evaluation  Subjective     50 y.o. male resting comfortably in bed this AM.  No new complaints.  Denies any symptoms consistent with steal syndrome.    Review of Systems  Unchanged  Objective     Scheduled Medications:   calcitriol, 0.25 mcg, Oral, Daily  cloNIDine, 0.3 mg, Oral, TID  famotidine, 10 mg, Oral, Daily  folic acid, 1 mg, Oral, Daily  losartan, 25 mg, Oral, Daily  predniSONE, 60 mg, Oral, Daily With Breakfast  sevelamer, 800 mg, Oral, TID With Meals  sodium bicarbonate, 650 mg, Oral, TID  sodium chloride, 10 mL, Intravenous, Q12H        Active Infusions:       As Needed Medications:  •  acetaminophen **OR** acetaminophen **OR** acetaminophen  •  acetaminophen  •  labetalol  •  melatonin  •  nitroglycerin  •  ondansetron **OR** ondansetron  •  pharmacy  •  sodium chloride    Vital Signs  Vital Signs Patient Vitals for the past 24 hrs:   BP Temp Temp src Pulse Resp SpO2 Weight   21 0300 98/61 97.5 °F (36.4 °C) Oral 67 18 97 % 61.8 kg (136 lb 3.9 oz)   21 1901 112/67 97.9 °F (36.6 °C) Oral 80 20 98 % 61.9 kg (136 lb 6.4 oz)   21 1808 125/65 -- -- 73 -- -- --   21 1300 155/85 98.3 °F (36.8 °C) Oral 69 16 99 % --   21 1155 146/89 97.7 °F (36.5 °C) Oral 68 16 -- --     I/O:   Intake/Output Summary (Last 24 hours) at 2021 0715  Last data filed at 6/3/2021 1901  Gross per 24 hour   Intake 240 ml   Output 1000 ml   Net -760 ml     BMI:  Body mass index is 23.39 kg/m².    Physical Exam:  Physical  Exam   Excellent thrill in right radiocephalic aVF.  Tunneled catheter in place.  Results Review:     CBC    Results from last 7 days   Lab Units 06/04/21  0255 06/03/21  1110 06/01/21  1756 06/01/21  0516 05/31/21  0856 05/30/21  0922 05/29/21  1342   WBC 10*3/mm3 11.40* 20.10*  --  7.80 6.50 7.80 7.20   HEMOGLOBIN g/dL 11.2* 11.2*  --  10.6* 10.4* 9.9* 10.3*   PLATELETS 10*3/mm3 68* 36* 22* 56* 33* 92* 269     BMP   Results from last 7 days   Lab Units 06/01/21  0516 05/31/21  0731 05/30/21  0922 05/29/21  1342   SODIUM mmol/L 133* 132* 133* 135*   POTASSIUM mmol/L 4.6 3.9 4.5 4.7   CHLORIDE mmol/L 94* 93* 95* 95*   CO2 mmol/L 24.0 28.0 24.0 17.0*   BUN mg/dL 36* 17 28* 70*   CREATININE mg/dL 7.92* 5.40* 8.38* 15.26*   GLUCOSE mg/dL 129* 92 98 173*   MAGNESIUM mg/dL 2.2 1.8 2.0  --      Coag   Results from last 7 days   Lab Units 05/30/21  1015 05/29/21  1342   INR  0.97 0.93   APTT seconds 25.2  --      HbA1C   Lab Results   Component Value Date    HGBA1C 5.0 05/30/2021     Infection     Radiology(recent) No radiology results for the last day    Assessment/Plan     Assessment & Plan      ESRD (end stage renal disease) on dialysis (CMS/[facility]ly [facility])    Chronic kidney disease on chronic dialysis (CMS/[facility]ly [facility])    Essential hypertension    Anemia, chronic disease    Essential hypertension    Thrombocytopenia (CMS/[facility])      50 y.o. male who we are asked for AV fistula evaluation.  Patient with recent recent progression to end-stage renal disease using a right-sided tunneled catheter for successful hemodialysis.  Patient underwent right radiocephalic AV fistula creation at [facility], per patient.  Upon review of AV fistula duplex, flow volumes and diameters are adequate, it is too young for cannulation.  Recommend ongoing catheter-based dialysis for several weeks to allow fistula to mature.  He may return to [facility] surgeons for continued follow-up or we will be happy to see this  patient in outpatient vascular clinic for ongoing surveillance.    Personal protective equipment used for this patient encounter:  Patient wearing surgical mask []    Provider wearing a surgical mask [x]    Gloves [x]    Eye protection []    Face Shield []    Gown []    N 95 respirator or CAPR/PAPR []   Duration of interaction 5 minutes    Blanca Santos PA-C  06/04/21  07:14 EDT    Please call my office with any question: [phone]    Active Hospital Problems    Diagnosis  POA   • **ESRD (end stage renal disease) on dialysis (CMS/[facility]) [N18.6, Z99.2]  Not Applicable   • Thrombocytopenia (CMS/HCC) [D69.6]  Yes   • Essential hypertension [I10]  Yes   • Anemia, chronic disease [D63.8]  Yes   • Essential hypertension [I10]  Yes   • Chronic kidney disease on chronic dialysis (CMS/HCC) [N18.6, Z99.2]  Not Applicable      Resolved Hospital Problems    Diagnosis Date Resolved POA   • Essential hypertension [I10] 05/30/2021 Yes

## 2021-06-04 NOTE — PLAN OF CARE
Problem: Adult Inpatient Plan of Care  Goal: Plan of Care Review  Outcome: Ongoing, Progressing  Flowsheets (Taken 6/4/2021 1818)  Outcome Summary: Pt sitting abed watching TV. Very pleasant to this writer. Ambulates without any difficulty. VSS. No complaints of pain, discomfort. Able to understand patients needs Ambulated to shower this evening. Tolerated well. Awaitng chair time with [facility], Anticipate DC to home with brother. Will continue to monitor.  Goal: Patient-Specific Goal (Individualized)  Outcome: Ongoing, Progressing  Goal: Absence of Hospital-Acquired Illness or Injury  Outcome: Ongoing, Progressing  Intervention: Identify and Manage Fall Risk  Recent Flowsheet Documentation  Taken 6/4/2021 1700 by Bharti June LPN  Safety Promotion/Fall Prevention: safety round/check completed  Taken 6/4/2021 1500 by Bharti June LPN  Safety Promotion/Fall Prevention: safety round/check completed  Taken 6/4/2021 1300 by Bharti June LPN  Safety Promotion/Fall Prevention: safety round/check completed  Taken 6/4/2021 1100 by Bharti June LPN  Safety Promotion/Fall Prevention: safety round/check completed  Taken 6/4/2021 0900 by Bharti June LPN  Safety Promotion/Fall Prevention: safety round/check completed  Taken 6/4/2021 0745 by Bharti June LPN  Safety Promotion/Fall Prevention:   assistive device/personal items within reach   clutter free environment maintained   lighting adjusted   nonskid shoes/slippers when out of bed   room organization consistent   safety round/check completed  Intervention: Prevent Skin Injury  Recent Flowsheet Documentation  Taken 6/4/2021 0745 by Bharti June LPN  Skin Protection: adhesive use limited  Intervention: Prevent and Manage VTE (venous thromboembolism) Risk  Recent Flowsheet Documentation  Taken 6/4/2021 0745 by Bharti June LPN  VTE Prevention/Management:   sequential compression devices off   patient refused  intervention  Intervention: Prevent Infection  Recent Flowsheet Documentation  Taken 6/4/2021 0745 by Bharti June LPN  Infection Prevention:   hand hygiene promoted   personal protective equipment utilized   rest/sleep promoted   single patient room provided   visitors restricted/screened  Goal: Optimal Comfort and Wellbeing  Outcome: Ongoing, Progressing  Goal: Readiness for Transition of Care  Outcome: Ongoing, Progressing     Problem: Electrolyte Imbalance (Acute Kidney Injury/Impairment)  Goal: Serum Electrolyte Balance  Outcome: Ongoing, Progressing  Intervention: Monitor and Manage Electrolyte Imbalance  Recent Flowsheet Documentation  Taken 6/4/2021 0745 by Bharti June LPN  Fluid/Electrolyte Management: fluids provided     Problem: Renal Function Impairment (Acute Kidney Injury/Impairment)  Goal: Effective Renal Function  Outcome: Ongoing, Progressing  Intervention: Monitor and Support Renal Function  Recent Flowsheet Documentation  Taken 6/4/2021 1700 by Bharti June LPN  Medication Review/Management: medications reviewed  Taken 6/4/2021 1500 by Bharti June LPN  Medication Review/Management: medications reviewed  Taken 6/4/2021 1300 by Bharti June LPN  Medication Review/Management: medications reviewed  Taken 6/4/2021 1100 by Bharti June LPN  Medication Review/Management: medications reviewed  Taken 6/4/2021 0900 by Bharti June LPN  Medication Review/Management: medications reviewed  Taken 6/4/2021 0745 by Bharti June LPN  Medication Review/Management: medications reviewed   Goal Outcome Evaluation:        Outcome Summary: Pt sitting abed watching TV. Very pleasant to this writer. Ambulates without any difficulty. VSS. No complaints of pain, discomfort. Able to understand patients needs Ambulated to shower this evening. Tolerated well. Awaitng chair time with [facility], Anticipate DC to home with brother. Will continue to monitor.

## 2021-06-04 NOTE — CASE MANAGEMENT/SOCIAL WORK
Continued Stay Note  [facility]     Patient Name: Josh Klein  MRN: [id number]  Today's Date: 6/4/2021    Admit Date: 5/29/2021    Discharge Plan     Row Name 06/04/21 1216       Plan    Plan  Anticipate routine home with brother. Waiting on new OP HD chair time at [facility], see note for details.    Plan Comments  Called and left Fabiola at [facility] a  a 0930 (# [phone]), no return phone call. Called and spoke to Darlene at [facility] at 1200. She informed they need the date that patients Medicaid application was filed ([facility] has this information), and needing to clarify if $26,000 income is for the household or patient only. Called and spoke to gerardo Armando and she informed that is patients income. Called and left VM for the  at [facility] to request the date that patients Medicaid application was filed. Dong GLORIA also working to obtain this date. Attempted to call Fabiola at [facility] to provide this updated information and had to leave a voicemail, waiting on return phone call. Waiting on patients outpatient chair time.    Update 1320-- received notice from Dong GLORIA that she confirmed with [facility] care coordination staff member that they submitted patient for [place] Medicaid insurance, however patient lives in [place]. See FAIZAN note for details. FAIZAN has emailed Medassist department at [facility] for guidance.   Received a return phone call from Fabiola at [facility] and provided updated information regarding newly learned Medicaid information and household income question. Pending clinical and financial clearance for chair time.         Phone communication or documentation only - no physical contact with patient or family.      Hollie Stewart RN

## 2021-06-04 NOTE — CASE MANAGEMENT/SOCIAL WORK
Continued Stay Note   [facility]     Patient Name: Josh Klein  MRN: [id number]  Today's Date: 6/4/2021    Admit Date: 5/29/2021    Discharge Plan     Row Name 06/04/21 1222       Plan    Plan Comments  Contacted [facility] ([phone]) regarding speaking with care coordination. Requested to speak with manager/director d/t having already left multiple voicemails, directed to voicemail box of supervisor, left another message 6/4. Called back & requested to speak with someon. Spoke with care coordination staff member, Shanon, who reports she will look into application status & return call. Pending call back.     Phone communication or documentation only - no physical contact with patient or family.  JAKOB Trejo    Phone: [phone]  Cell: [phone]  Fax: [phone]  [email or web address]

## 2021-06-04 NOTE — PLAN OF CARE
Goal Outcome Evaluation:  Plan of Care Reviewed With: patient  Progress: improving  Outcome Summary: Pt is resting in bed. Pt shows no s/s of distress and vitals aer stable. Pt voiced no concerns. Pt uses  on phone when he does not understand what staff is saying. Call light within reach. Will continue to monitor.

## 2021-06-05 LAB
ANION GAP SERPL CALCULATED.3IONS-SCNC: 19 MMOL/L (ref 5–15)
BASOPHILS # BLD AUTO: 0.1 10*3/MM3 (ref 0–0.2)
BASOPHILS NFR BLD AUTO: 0.8 % (ref 0–1.5)
BUN SERPL-MCNC: 67 MG/DL (ref 6–20)
BUN/CREAT SERPL: 7.3 (ref 7–25)
CALCIUM SPEC-SCNC: 8.5 MG/DL (ref 8.6–10.5)
CHLORIDE SERPL-SCNC: 93 MMOL/L (ref 98–107)
CO2 SERPL-SCNC: 22 MMOL/L (ref 22–29)
CREAT SERPL-MCNC: 9.17 MG/DL (ref 0.76–1.27)
DEPRECATED RDW RBC AUTO: 47.3 FL (ref 37–54)
EOSINOPHIL # BLD AUTO: 0 10*3/MM3 (ref 0–0.4)
EOSINOPHIL NFR BLD AUTO: 0.3 % (ref 0.3–6.2)
ERYTHROCYTE [DISTWIDTH] IN BLOOD BY AUTOMATED COUNT: 14.5 % (ref 12.3–15.4)
GFR SERPL CREATININE-BSD FRML MDRD: 6 ML/MIN/1.73
GFR SERPL CREATININE-BSD FRML MDRD: 7 ML/MIN/1.73
GLUCOSE SERPL-MCNC: 141 MG/DL (ref 65–99)
HCT VFR BLD AUTO: 33.1 % (ref 37.5–51)
HGB BLD-MCNC: 11.1 G/DL (ref 13–17.7)
LYMPHOCYTES # BLD AUTO: 1.7 10*3/MM3 (ref 0.7–3.1)
LYMPHOCYTES NFR BLD AUTO: 12.6 % (ref 19.6–45.3)
MCH RBC QN AUTO: 31.2 PG (ref 26.6–33)
MCHC RBC AUTO-ENTMCNC: 33.4 G/DL (ref 31.5–35.7)
MCV RBC AUTO: 93.4 FL (ref 79–97)
MONOCYTES # BLD AUTO: 1.3 10*3/MM3 (ref 0.1–0.9)
MONOCYTES NFR BLD AUTO: 10 % (ref 5–12)
NEUTROPHILS NFR BLD AUTO: 10.1 10*3/MM3 (ref 1.7–7)
NEUTROPHILS NFR BLD AUTO: 76.3 % (ref 42.7–76)
NRBC BLD AUTO-RTO: 0.1 /100 WBC (ref 0–0.2)
PLATELET # BLD AUTO: 128 10*3/MM3 (ref 140–450)
PMV BLD AUTO: 9.9 FL (ref 6–12)
POTASSIUM SERPL-SCNC: 4.8 MMOL/L (ref 3.5–5.2)
RBC # BLD AUTO: 3.54 10*6/MM3 (ref 4.14–5.8)
SODIUM SERPL-SCNC: 134 MMOL/L (ref 136–145)
WBC # BLD AUTO: 13.2 10*3/MM3 (ref 3.4–10.8)

## 2021-06-05 PROCEDURE — 99233 SBSQ HOSP IP/OBS HIGH 50: CPT | Performed by: INTERNAL MEDICINE

## 2021-06-05 PROCEDURE — 80048 BASIC METABOLIC PNL TOTAL CA: CPT | Performed by: INTERNAL MEDICINE

## 2021-06-05 PROCEDURE — 85025 COMPLETE CBC W/AUTO DIFF WBC: CPT | Performed by: NURSE PRACTITIONER

## 2021-06-05 PROCEDURE — 5A1D70Z PERFORMANCE OF URINARY FILTRATION, INTERMITTENT, LESS THAN 6 HOURS PER DAY: ICD-10-PCS | Performed by: INTERNAL MEDICINE

## 2021-06-05 RX ORDER — ALBUMIN (HUMAN) 12.5 G/50ML
12.5 SOLUTION INTRAVENOUS AS NEEDED
Status: ACTIVE | OUTPATIENT
Start: 2021-06-05 | End: 2021-06-05

## 2021-06-05 RX ADMIN — CLONIDINE HYDROCHLORIDE 0.1 MG: 0.1 TABLET ORAL at 15:58

## 2021-06-05 RX ADMIN — SODIUM BICARBONATE 650 MG: 650 TABLET ORAL at 21:03

## 2021-06-05 RX ADMIN — SEVELAMER CARBONATE 800 MG: 800 TABLET, FILM COATED ORAL at 17:40

## 2021-06-05 RX ADMIN — Medication 10 ML: at 21:03

## 2021-06-05 RX ADMIN — CALCITRIOL CAPSULES 0.25 MCG 0.25 MCG: 0.25 CAPSULE ORAL at 13:47

## 2021-06-05 RX ADMIN — Medication 10 ML: at 13:48

## 2021-06-05 RX ADMIN — CLONIDINE HYDROCHLORIDE 0.1 MG: 0.1 TABLET ORAL at 21:03

## 2021-06-05 RX ADMIN — FOLIC ACID 1 MG: 1 TABLET ORAL at 13:47

## 2021-06-05 RX ADMIN — LOSARTAN POTASSIUM 25 MG: 25 TABLET, FILM COATED ORAL at 13:47

## 2021-06-05 RX ADMIN — SEVELAMER CARBONATE 800 MG: 800 TABLET, FILM COATED ORAL at 13:47

## 2021-06-05 RX ADMIN — SODIUM BICARBONATE 650 MG: 650 TABLET ORAL at 15:58

## 2021-06-05 NOTE — PROGRESS NOTES
[facility] Medicine Services Daily Progress Note      Hospitalist Team  LOS 4 days      Patient Care Team:  Provider, No Known as PCP - Devan Drew MD as Consulting Physician (Hematology and Oncology)    Patient Location: 311/1      Subjective   Subjective     Chief Complaint / Subjective  Chief Complaint   Patient presents with   • Hypotension     pt seen here on Sunday to receive dialysis-pt without insurance and recently started receiving dialysis with the hospital, pt has not received dialysis since last Sunday. pt denies any pain, swelling or SOA         Brief Synopsis of Hospital Course/HPI    Patient is a 50-year-old male with past medical history of end-stage renal disease on hemodialysis, anemia of chronic kidney disease and hypertension who presented to the emergency room because of hypotension.  Patient was recently in the hospital and completed his hemodialysis.  Patient is unable to get dialysis because of his lack of insurance.  Patient needs emergent dialysis and was recommended for admission for further treatment and management.  Nephrology consult was completed.  Hematology consult was completed because of thrombocytopenia.    Patient reported no new symptoms.      Date:: 6/4/2021.        Review of Systems   Constitutional: Negative.   HENT: Negative.    Eyes: Negative.    Cardiovascular: Negative.    Respiratory: Negative.    Endocrine: Negative.    Hematologic/Lymphatic: Negative.    Skin: Negative.    Musculoskeletal: Negative.    Gastrointestinal: Negative.    Genitourinary: Negative.    Neurological: Negative.    Psychiatric/Behavioral: Negative.    Allergic/Immunologic: Negative.          Objective   Objective      Vital Signs  Temp:  [98.1 °F (36.7 °C)-98.9 °F (37.2 °C)] 98.3 °F (36.8 °C)  Heart Rate:  [] 82  Resp:  [16-18] 16  BP: (122-158)/(73-98) 158/96  Oxygen Therapy  SpO2: 98 %  Pulse Oximetry Type: Intermittent  Device (Oxygen Therapy):  "room air  Flowsheet Rows      First Filed Value   Admission Height  162.6 cm (64\") Documented at 05/29/2021 1252   Admission Weight  61.9 kg (136 lb 7.4 oz) Documented at 05/29/2021 1252        Intake & Output (last 3 days)       06/02 0701 - 06/03 0700 06/03 0701 - 06/04 0700 06/04 0701 - 06/05 0700 06/05 0701 - 06/06 0700    P.O. 480 240 720     Total Intake(mL/kg) 480 (8) 240 (3.9) 720 (11.8)     Urine (mL/kg/hr)  0 (0)      Other  1000  1000    Total Output  1000  1000    Net +480 -760 +720 -1000            Urine Unmeasured Occurrence  1 x 1 x         Lines, Drains & Airways    Active LDAs     Name:   Placement date:   Placement time:   Site:   Days:    CVC Double Lumen 05/06/21 Tunneled Right Subclavian   05/06/21    --    Subclavian   25    Peripheral IV 05/29/21 1340 Left Antecubital   05/29/21    1340    Antecubital   1                  Physical Exam:    Physical Exam  Vitals and nursing note reviewed.   Constitutional:       General: He is not in acute distress.  HENT:      Head: Normocephalic.      Nose: Nose normal.      Mouth/Throat:      Mouth: Mucous membranes are dry.      Pharynx: Oropharynx is clear.   Eyes:      Extraocular Movements: Extraocular movements intact.      Conjunctiva/sclera: Conjunctivae normal.      Pupils: Pupils are equal, round, and reactive to light.   Cardiovascular:      Pulses: Normal pulses.      Heart sounds: No murmur heard.   No friction rub. No gallop.       Comments: S1 and S2 present.  No tachycardia.  Pulmonary:      Breath sounds: No stridor. No wheezing or rales.      Comments: Good air entry bilaterally.  No crackles.  Chest:      Chest wall: No tenderness.   Abdominal:      General: Bowel sounds are normal. There is no distension.      Palpations: Abdomen is soft.      Tenderness: There is no abdominal tenderness. There is no right CVA tenderness or guarding.   Musculoskeletal:         General: No swelling, tenderness, deformity or signs of injury.      Cervical " back: Normal range of motion. No rigidity.      Right lower leg: No edema.      Left lower leg: No edema.   Skin:     General: Skin is warm and dry.      Capillary Refill: Capillary refill takes less than 2 seconds.      Coloration: Skin is not jaundiced.      Findings: No bruising, erythema, lesion or rash.   Neurological:      General: No focal deficit present.   Psychiatric:      Comments: No agitation.               Procedures:              Results Review:     I reviewed the patient's new clinical results.      Lab Results (last 24 hours)     Procedure Component Value Units Date/Time    Basic Metabolic Panel [[id number]]  (Abnormal) Collected: 06/05/21 0301    Specimen: Blood Updated: 06/05/21 0355     Glucose 141 mg/dL      BUN 67 mg/dL      Creatinine 9.17 mg/dL      Sodium 134 mmol/L      Potassium 4.8 mmol/L      Comment: Slight hemolysis detected by analyzer. Results may be affected.        Chloride 93 mmol/L      CO2 22.0 mmol/L      Calcium 8.5 mg/dL      eGFR  African Amer 7 mL/min/1.73      Comment: <15 Indicative of kidney failure.        eGFR Non African Amer 6 mL/min/1.73      Comment: <15 Indicative of kidney failure.        BUN/Creatinine Ratio 7.3     Anion Gap 19.0 mmol/L     Narrative:      GFR Normal >60  Chronic Kidney Disease <60  Kidney Failure <15      CBC & Differential [[id number]]  (Abnormal) Collected: 06/05/21 0301    Specimen: Blood Updated: 06/05/21 0334    Narrative:      The following orders were created for panel order CBC & Differential.  Procedure                               Abnormality         Status                     ---------                               -----------         ------                     CBC Auto Differential[[id number]]        Abnormal            Final result                 Please view results for these tests on the individual orders.    CBC Auto Differential [[id number]]  (Abnormal) Collected: 06/05/21 0301    Specimen: Blood Updated: 06/05/21 0334     WBC  13.20 10*3/mm3      RBC 3.54 10*6/mm3      Hemoglobin 11.1 g/dL      Hematocrit 33.1 %      MCV 93.4 fL      MCH 31.2 pg      MCHC 33.4 g/dL      RDW 14.5 %      RDW-SD 47.3 fl      MPV 9.9 fL      Platelets 128 10*3/mm3      Comment: Result checked         Neutrophil % 76.3 %      Lymphocyte % 12.6 %      Monocyte % 10.0 %      Eosinophil % 0.3 %      Basophil % 0.8 %      Neutrophils, Absolute 10.10 10*3/mm3      Lymphocytes, Absolute 1.70 10*3/mm3      Monocytes, Absolute 1.30 10*3/mm3      Eosinophils, Absolute 0.00 10*3/mm3      Basophils, Absolute 0.10 10*3/mm3      nRBC 0.1 /100 WBC         No results found for: HGBA1C  Results from last 7 days   Lab Units 05/30/21  1015   INR  0.97           No results found for: LIPASE  No results found for: CHOL, CHLPL, TRIG, HDL, LDL, LDLDIRECT    No results found for: INTRAOP, PREDX, FINALDX, COMDX    Microbiology Results (last 10 days)     Procedure Component Value - Date/Time    COVID PRE-OP / PRE-PROCEDURE SCREENING ORDER (NO ISOLATION) - Swab, Nasopharynx [[id number]]  (Normal) Collected: 05/29/21 1634    Lab Status: Final result Specimen: Swab from Nasopharynx Updated: 05/29/21 1703    Narrative:      The following orders were created for panel order COVID PRE-OP / PRE-PROCEDURE SCREENING ORDER (NO ISOLATION) - Swab, Nasopharynx.  Procedure                               Abnormality         Status                     ---------                               -----------         ------                     COVID-19,CEPHEID,COR/DARRYN...[[id number]]  Normal              Final result                 Please view results for these tests on the individual orders.    COVID-19,CEPHEID,COR/DARRYN/PAD IN-HOUSE(OR EMERGENT/ADD-ON),NP SWAB IN TRANSPORT MEDIA 3-4 HR TAT, RT-PCR - Swab, Nasopharynx [[id number]]  (Normal) Collected: 05/29/21 1634    Lab Status: Final result Specimen: Swab from Nasopharynx Updated: 05/29/21 1703     COVID19 Not Detected    Narrative:      Fact sheet for  providers: https://www.fda.gov/media/474071/download     Fact sheet for patients: https://www.fda.gov/media/230369/download  Fact sheet for providers: https://www.fda.gov/media/002204/download    Fact sheet for patients: https://www.fda.gov/media/588050/download    Test performed by PCR.          ECG/EMG Results (most recent)     Procedure Component Value Units Date/Time    ECG 12 Lead [[id number]] Collected: 05/29/21 1333     Updated: 05/30/21 0821     QT Interval 413 ms     Narrative:      HEART RATE= 70  bpm  RR Interval= 864  ms  VA Interval= 199  ms  P Horizontal Axis= -29  deg  P Front Axis= 47  deg  QRSD Interval= 110  ms  QT Interval= 413  ms  QRS Axis= -48  deg  T Wave Axis= 45  deg  - BORDERLINE ECG -  Sinus rhythm  Probable left ventricular hypertrophy  When compared with ECG of 23-May-2021 12:49:50,  Significant repolarization change  Electronically Signed By: Román Mcnair ([facility]) 30-May-2021 08:20:51  Date and Time of Study: 2021-05-29 13:33:44          Results for orders placed during the hospital encounter of 05/29/21    Duplex Hemodialysis Access CAR    Interpretation Summary  · Patent right radiocephalic AV fistula with an anastomotic stenosis and adequate volume flow. 4 mm cephalic vein tributary proximally.          XR Chest 1 View    Result Date: 5/29/2021  No acute cardiopulmonary abnormality or significant change.  Electronically Signed By-Iesha Winter MD On:5/29/2021 3:19 PM This report was finalized on [id number] by  Iesha Winter MD.          Xrays, labs reviewed personally by physician.    Medication Review:   I have reviewed the patient's current medication list      Scheduled Meds  calcitriol, 0.25 mcg, Oral, Daily  cloNIDine, 0.1 mg, Oral, TID  folic acid, 1 mg, Oral, Daily  losartan, 25 mg, Oral, Daily  sevelamer, 800 mg, Oral, TID With Meals  sodium bicarbonate, 650 mg, Oral, TID  sodium chloride, 10 mL, Intravenous, Q12H        Meds Infusions       Meds PRN  •  acetaminophen  **OR** acetaminophen **OR** acetaminophen  •  acetaminophen  •  albumin human  •  labetalol  •  melatonin  •  nitroglycerin  •  ondansetron **OR** ondansetron  •  pharmacy  •  sodium chloride        Assessment/Plan   Assessment/Plan     Active Hospital Problems    Diagnosis  POA   • **ESRD (end stage renal disease) on dialysis (CMS/[facility]) [N18.6, Z99.2]  Not Applicable     Priority: High  Follow nephrology recommendations.  Continue hemodialysis.     • Anemia, chronic disease [D63.8]  Yes     Priority: Medium  Monitor H&H.    Thrombocytopenia  Follow hematology oncology recommendations.     • Essential hypertension [I10]  Yes     Priority: Medium  Treat with Catapres.          Yes   • Chronic kidney disease on chronic dialysis (CMS/[facility]) [N18.6, Z99.2]  Follow nephrology recommendations.      Thrombocytopenia.  Follow heme-onc recommendations.    DVT prophylaxis   -Heparin and SCDs.      Not Applicable      Resolved Hospital Problems    Diagnosis Date Resolved POA      Yes       MEDICAL DECISION MAKING COMPLEXITY BY PROBLEM:     Continue appropriate patient's home medications for other chronic medical conditions.  Continue the present level of care.  Patient and family agreed with the plan of care.          VTE Prophylaxis -   Mechanical Order History:      Ordered        05/30/21 1432  Place Sequential Compression Device  Once         05/30/21 1432  Maintain Sequential Compression Device  Continuous                 Pharmalogical Order History:      Ordered     Dose Route Frequency Stop    05/29/21 1710  heparin (porcine) 5000 UNIT/ML injection 5,000 Units  Status:  Discontinued      5,000 Units SC Every 12 Hours Scheduled 05/30/21 1432                  Code Status -   Code Status and Medical Interventions:   Ordered at: 05/29/21 1628     Code Status:    CPR     Medical Interventions (Level of Support Prior to Arrest):    Full       This patient has been examined wearing appropriate Personal Protective Equipment and  discussed with hospital infection control department, [facility], infectious disease specialist and pulmonologist. 06/04/21        Discharge Planning  Pending patient's clinical improvement.        Electronically signed by Benjy Nayak MD, 06/04/21, 11:54 EDT.  [facility]

## 2021-06-05 NOTE — PROGRESS NOTES
[facility] Medicine Services Daily Progress Note      Hospitalist Team  LOS 4 days      Patient Care Team:  Provider, No Known as PCP - Devan Drew MD as Consulting Physician (Hematology and Oncology)    Patient Location: 311/1      Subjective   Subjective     Chief Complaint / Subjective  Chief Complaint   Patient presents with   • Hypotension     pt seen here on Sunday to receive dialysis-pt without insurance and recently started receiving dialysis with the hospital, pt has not received dialysis since last Sunday. pt denies any pain, swelling or SOA         Brief Synopsis of Hospital Course/HPI    Patient is a 50-year-old male with past medical history of hypertension, anemia of chronic kidney disease, end-stage renal disease on hemodialysis who presented to the emergency room because of hypotension.  Patient was recently in the hospital and completed his hemodialysis.  Patient is unable to get dialysis because of his lack of insurance.  Patient needs emergent dialysis and was recommended for admission for further treatment and management.  Nephrology consult was completed.  Hematology consult was completed because of thrombocytopenia.    Patient reported sleeping well last night.      Date:: 6/5/2021.        Review of Systems   Constitutional: Negative.   HENT: Negative.    Eyes: Negative.    Cardiovascular: Negative.    Respiratory: Negative.    Endocrine: Negative.    Hematologic/Lymphatic: Negative.    Skin: Negative.    Musculoskeletal: Negative.    Gastrointestinal: Negative.    Genitourinary: Negative.    Neurological: Negative.    Psychiatric/Behavioral: Negative.    Allergic/Immunologic: Negative.          Objective   Objective      Vital Signs  Temp:  [98.1 °F (36.7 °C)-98.9 °F (37.2 °C)] 98.3 °F (36.8 °C)  Heart Rate:  [] 82  Resp:  [16-18] 16  BP: (122-158)/(73-98) 158/96  Oxygen Therapy  SpO2: 98 %  Pulse Oximetry Type: Intermittent  Device (Oxygen  "Therapy): room air  Flowsheet Rows      First Filed Value   Admission Height  162.6 cm (64\") Documented at 05/29/2021 1252   Admission Weight  61.9 kg (136 lb 7.4 oz) Documented at 05/29/2021 1252        Intake & Output (last 3 days)       06/02 0701 - 06/03 0700 06/03 0701 - 06/04 0700 06/04 0701 - 06/05 0700 06/05 0701 - 06/06 0700    P.O. 480 240 720     Total Intake(mL/kg) 480 (8) 240 (3.9) 720 (11.8)     Urine (mL/kg/hr)  0 (0)      Other  1000  1000    Total Output  1000  1000    Net +480 -760 +720 -1000            Urine Unmeasured Occurrence  1 x 1 x         Lines, Drains & Airways    Active LDAs     Name:   Placement date:   Placement time:   Site:   Days:    CVC Double Lumen 05/06/21 Tunneled Right Subclavian   05/06/21    --    Subclavian   25    Peripheral IV 05/29/21 1340 Left Antecubital   05/29/21    1340    Antecubital   1                  Physical Exam:    Physical Exam  Vitals and nursing note reviewed.   Constitutional:       General: He is not in acute distress.     Comments: Patient is lying comfortably in bed.   HENT:      Head: Normocephalic.      Nose: Nose normal.      Mouth/Throat:      Mouth: Mucous membranes are dry.      Pharynx: Oropharynx is clear.   Eyes:      Extraocular Movements: Extraocular movements intact.      Conjunctiva/sclera: Conjunctivae normal.      Pupils: Pupils are equal, round, and reactive to light.   Cardiovascular:      Pulses: Normal pulses.      Heart sounds: No murmur heard.   No friction rub. No gallop.       Comments: S1 and S2 present.  No tachycardia.  Pulmonary:      Effort: Pulmonary effort is normal.      Breath sounds: No stridor. No wheezing or rales.      Comments: Good air entry bilaterally.  No wheezing.  Chest:      Chest wall: No tenderness.   Abdominal:      General: Bowel sounds are normal. There is no distension.      Palpations: Abdomen is soft.      Tenderness: There is no abdominal tenderness. There is no right CVA tenderness or guarding. "   Musculoskeletal:         General: No swelling, tenderness, deformity or signs of injury.      Cervical back: Normal range of motion. No rigidity.      Right lower leg: No edema.      Left lower leg: No edema.   Skin:     General: Skin is warm and dry.      Capillary Refill: Capillary refill takes less than 2 seconds.      Coloration: Skin is not jaundiced.      Findings: No bruising, erythema, lesion or rash.   Neurological:      General: No focal deficit present.   Psychiatric:      Comments: No agitation.               Procedures:              Results Review:     I reviewed the patient's new clinical results.      Lab Results (last 24 hours)     Procedure Component Value Units Date/Time    Basic Metabolic Panel [[id number]]  (Abnormal) Collected: 06/05/21 0301    Specimen: Blood Updated: 06/05/21 0355     Glucose 141 mg/dL      BUN 67 mg/dL      Creatinine 9.17 mg/dL      Sodium 134 mmol/L      Potassium 4.8 mmol/L      Comment: Slight hemolysis detected by analyzer. Results may be affected.        Chloride 93 mmol/L      CO2 22.0 mmol/L      Calcium 8.5 mg/dL      eGFR  African Amer 7 mL/min/1.73      Comment: <15 Indicative of kidney failure.        eGFR Non African Amer 6 mL/min/1.73      Comment: <15 Indicative of kidney failure.        BUN/Creatinine Ratio 7.3     Anion Gap 19.0 mmol/L     Narrative:      GFR Normal >60  Chronic Kidney Disease <60  Kidney Failure <15      CBC & Differential [[id number]]  (Abnormal) Collected: 06/05/21 0301    Specimen: Blood Updated: 06/05/21 0334    Narrative:      The following orders were created for panel order CBC & Differential.  Procedure                               Abnormality         Status                     ---------                               -----------         ------                     CBC Auto Differential[[id number]]        Abnormal            Final result                 Please view results for these tests on the individual orders.    CBC Auto  Differential [[id number]]  (Abnormal) Collected: 06/05/21 0301    Specimen: Blood Updated: 06/05/21 0334     WBC 13.20 10*3/mm3      RBC 3.54 10*6/mm3      Hemoglobin 11.1 g/dL      Hematocrit 33.1 %      MCV 93.4 fL      MCH 31.2 pg      MCHC 33.4 g/dL      RDW 14.5 %      RDW-SD 47.3 fl      MPV 9.9 fL      Platelets 128 10*3/mm3      Comment: Result checked         Neutrophil % 76.3 %      Lymphocyte % 12.6 %      Monocyte % 10.0 %      Eosinophil % 0.3 %      Basophil % 0.8 %      Neutrophils, Absolute 10.10 10*3/mm3      Lymphocytes, Absolute 1.70 10*3/mm3      Monocytes, Absolute 1.30 10*3/mm3      Eosinophils, Absolute 0.00 10*3/mm3      Basophils, Absolute 0.10 10*3/mm3      nRBC 0.1 /100 WBC         No results found for: HGBA1C  Results from last 7 days   Lab Units 05/30/21  1015   INR  0.97           No results found for: LIPASE  No results found for: CHOL, CHLPL, TRIG, HDL, LDL, LDLDIRECT    No results found for: INTRAOP, PREDX, FINALDX, COMDX    Microbiology Results (last 10 days)     Procedure Component Value - Date/Time    COVID PRE-OP / PRE-PROCEDURE SCREENING ORDER (NO ISOLATION) - Swab, Nasopharynx [[id number]]  (Normal) Collected: 05/29/21 1634    Lab Status: Final result Specimen: Swab from Nasopharynx Updated: 05/29/21 1703    Narrative:      The following orders were created for panel order COVID PRE-OP / PRE-PROCEDURE SCREENING ORDER (NO ISOLATION) - Swab, Nasopharynx.  Procedure                               Abnormality         Status                     ---------                               -----------         ------                     COVID-19,CEPHEID,COR/DARRYN...[[id number]]  Normal              Final result                 Please view results for these tests on the individual orders.    COVID-19,CEPHEID,COR/DARRYN/PAD IN-HOUSE(OR EMERGENT/ADD-ON),NP SWAB IN TRANSPORT MEDIA 3-4 HR TAT, RT-PCR - Swab, Nasopharynx [[id number]]  (Normal) Collected: 05/29/21 1634    Lab Status: Final result Specimen:  Swab from Nasopharynx Updated: 05/29/21 1703     COVID19 Not Detected    Narrative:      Fact sheet for providers: https://www.fda.gov/media/415047/download     Fact sheet for patients: https://www.fda.gov/media/659086/download  Fact sheet for providers: https://www.fda.gov/media/849579/download    Fact sheet for patients: https://www.fda.gov/media/417460/download    Test performed by PCR.          ECG/EMG Results (most recent)     Procedure Component Value Units Date/Time    ECG 12 Lead [[id number]] Collected: 05/29/21 1333     Updated: 05/30/21 0821     QT Interval 413 ms     Narrative:      HEART RATE= 70  bpm  RR Interval= 864  ms  OK Interval= 199  ms  P Horizontal Axis= -29  deg  P Front Axis= 47  deg  QRSD Interval= 110  ms  QT Interval= 413  ms  QRS Axis= -48  deg  T Wave Axis= 45  deg  - BORDERLINE ECG -  Sinus rhythm  Probable left ventricular hypertrophy  When compared with ECG of 23-May-2021 12:49:50,  Significant repolarization change  Electronically Signed By: Román Mcnair (Joseph) 30-May-2021 08:20:51  Date and Time of Study: 2021-05-29 13:33:44          Results for orders placed during the hospital encounter of 05/29/21    Duplex Hemodialysis Access CAR    Interpretation Summary  · Patent right radiocephalic AV fistula with an anastomotic stenosis and adequate volume flow. 4 mm cephalic vein tributary proximally.          XR Chest 1 View    Result Date: 5/29/2021  No acute cardiopulmonary abnormality or significant change.  Electronically Signed By-Iesha Winter MD On:5/29/2021 3:19 PM This report was finalized on [id number] by  Iesha Winter MD.          Xrays, labs reviewed personally by physician.    Medication Review:   I have reviewed the patient's current medication list      Scheduled Meds  calcitriol, 0.25 mcg, Oral, Daily  cloNIDine, 0.1 mg, Oral, TID  folic acid, 1 mg, Oral, Daily  losartan, 25 mg, Oral, Daily  sevelamer, 800 mg, Oral, TID With Meals  sodium bicarbonate, 650 mg, Oral,  TID  sodium chloride, 10 mL, Intravenous, Q12H        Meds Infusions       Meds PRN  •  acetaminophen **OR** acetaminophen **OR** acetaminophen  •  acetaminophen  •  albumin human  •  labetalol  •  melatonin  •  nitroglycerin  •  ondansetron **OR** ondansetron  •  pharmacy  •  sodium chloride        Assessment/Plan   Assessment/Plan     Active Hospital Problems    Diagnosis  POA   • **ESRD (end stage renal disease) on dialysis (CMS/[facility]) [N18.6, Z99.2]  Not Applicable     Priority: High  Follow nephrology recommendations.  Continue hemodialysis.     • Anemia, chronic disease [D63.8]  Yes     Priority: Medium  Monitor H&H.    Thrombocytopenia  Follow hematology oncology recommendations.     • Essential hypertension [I10]  Yes     Priority: Medium  Treat with Catapres.     • Essential hypertension [I10]  Treat with losartan.  Follow nephrology recommendations.      Yes   • Chronic kidney disease on chronic dialysis (CMS/[facility]) [N18.6, Z99.2]  Follow nephrology recommendations.      Thrombocytopenia.  Follow heme-onc recommendations.    DVT prophylaxis   -Heparin and SCDs.      Not Applicable      Resolved Hospital Problems    Diagnosis Date Resolved POA      Yes       MEDICAL DECISION MAKING COMPLEXITY BY PROBLEM:     Continue appropriate patient's home medications for other chronic medical conditions.  Continue the present level of care.  Patient and family agreed with the plan of care.          VTE Prophylaxis -   Mechanical Order History:      Ordered        05/30/21 1432  Place Sequential Compression Device  Once         05/30/21 1432  Maintain Sequential Compression Device  Continuous                 Pharmalogical Order History:      Ordered     Dose Route Frequency Stop    05/29/21 1710  heparin (porcine) 5000 UNIT/ML injection 5,000 Units  Status:  Discontinued      5,000 Units SC Every 12 Hours Scheduled 05/30/21 1432                  Code Status -   Code Status and Medical Interventions:   Ordered at: 05/29/21  1628     Code Status:    CPR     Medical Interventions (Level of Support Prior to Arrest):    Full       This patient has been examined wearing appropriate Personal Protective Equipment and discussed with hospital infection control department, [facility] department, infectious disease specialist and pulmonologist. 06/05/21        Discharge Planning  Pending patient's clinical improvement.        Electronically signed by Benjy Nayak MD, 06/05/21, 14:50 EDT.  [facility] Hospitalist Team

## 2021-06-05 NOTE — PLAN OF CARE
Goal Outcome Evaluation:        Outcome Summary: Pt rested well throughout the night, no c/o pain or discomfort at this time. Pt to have Dialysis in AM. Currently awaiting outpt chair time for discharge. Will continue to monitor.

## 2021-06-05 NOTE — PROGRESS NOTES
"RENAL/KCC:     LOS: 4 days    Patient Care Team:  Provider, No Known as PCP - Devan Drew MD as Consulting Physician (Hematology and Oncology)    Chief Complaint:  ESRD    Subjective     Interval History:   Chart reviewed.  Had HD this am, tolerated well. Feeling ok today. No chest pain, sob, nausea or vomiting.     Objective     Vital Sign Min/Max for last 24 hours  Temp  Min: 98.1 °F (36.7 °C)  Max: 98.9 °F (37.2 °C)   BP  Min: 122/73  Max: 150/89   Pulse  Min: 81  Max: 109   Resp  Min: 16  Max: 18   SpO2  Min: 96 %  Max: 98 %   No data recorded   Weight  Min: 60.8 kg (134 lb 0.6 oz)  Max: 60.8 kg (134 lb 0.6 oz)     Flowsheet Rows      First Filed Value   Admission Height  162.6 cm (64\") Documented at 05/29/2021 1252   Admission Weight  61.9 kg (136 lb 7.4 oz) Documented at 05/29/2021 1252          No intake/output data recorded.  I/O last 3 completed shifts:  In: 960 [P.O.:960]  Out: -     Physical Exam:  GEN: Awake, NAD  ENT: PERRL, EOMI, MMM  NECK: Supple, no JVD, rij tunneled HD catheter   CHEST: CTAB, no W/R/C  CV: RRR, no M/G/R  ABD: Soft, NT, +BS  SKIN: Warm and Dry  NEURO: CN's intact      WBC WBC   Date Value Ref Range Status   06/05/2021 13.20 (H) 3.40 - 10.80 10*3/mm3 Final   06/04/2021 11.40 (H) 3.40 - 10.80 10*3/mm3 Final   06/03/2021 20.10 (H) 3.40 - 10.80 10*3/mm3 Final      HGB Hemoglobin   Date Value Ref Range Status   06/05/2021 11.1 (L) 13.0 - 17.7 g/dL Final   06/04/2021 11.2 (L) 13.0 - 17.7 g/dL Final   06/03/2021 11.2 (L) 13.0 - 17.7 g/dL Final      HCT Hematocrit   Date Value Ref Range Status   06/05/2021 33.1 (L) 37.5 - 51.0 % Final   06/04/2021 33.8 (L) 37.5 - 51.0 % Final   06/03/2021 33.9 (L) 37.5 - 51.0 % Final      Platlets No results found for: LABPLAT   MCV MCV   Date Value Ref Range Status   06/05/2021 93.4 79.0 - 97.0 fL Final   06/04/2021 93.7 79.0 - 97.0 fL Final   06/03/2021 92.8 79.0 - 97.0 fL Final          Sodium Sodium   Date Value Ref Range Status "   06/05/2021 134 (L) 136 - 145 mmol/L Final      Potassium Potassium   Date Value Ref Range Status   06/05/2021 4.8 3.5 - 5.2 mmol/L Final     Comment:     Slight hemolysis detected by analyzer. Results may be affected.      Chloride Chloride   Date Value Ref Range Status   06/05/2021 93 (L) 98 - 107 mmol/L Final      CO2 CO2   Date Value Ref Range Status   06/05/2021 22.0 22.0 - 29.0 mmol/L Final      BUN BUN   Date Value Ref Range Status   06/05/2021 67 (H) 6 - 20 mg/dL Final      Creatinine Creatinine   Date Value Ref Range Status   06/05/2021 9.17 (H) 0.76 - 1.27 mg/dL Final      Calcium Calcium   Date Value Ref Range Status   06/05/2021 8.5 (L) 8.6 - 10.5 mg/dL Final      PO4 No results found for: CAPO4   Albumin No results found for: ALBUMIN   Magnesium No results found for: MG   Uric Acid No results found for: URICACID        Results Review:     I reviewed the patient's new clinical results.    calcitriol, 0.25 mcg, Oral, Daily  cloNIDine, 0.1 mg, Oral, TID  folic acid, 1 mg, Oral, Daily  losartan, 25 mg, Oral, Daily  sevelamer, 800 mg, Oral, TID With Meals  sodium bicarbonate, 650 mg, Oral, TID  sodium chloride, 10 mL, Intravenous, Q12H           Medication Review: Reviewed    Assessment/Plan       ESRD (end stage renal disease) on dialysis (CMS/HCC)    Chronic kidney disease on chronic dialysis (CMS/HCC)    Essential hypertension    Anemia, chronic disease    Essential hypertension    Thrombocytopenia (CMS/HCC)      Plan: Continue HD TTS.  Working on an outpatient HD spot. No heparin with dialysis.  Hematology following for TCP work-up and management.  Social work following.      Paris Bazzi MD   [facility]  06/05/21  13:21 EDT

## 2021-06-05 NOTE — PLAN OF CARE
Goal Outcome Evaluation:     Progress: no change  Outcome Summary: pt has been resting during the shift. pt spent most of the morning in dialysis. pt states feeling better after dialysis. pt has had no complaints of pain at this point. pt has been encouraged to turn to prevent skin breakdown.

## 2021-06-05 NOTE — PROGRESS NOTES
Hematology/Oncology Inpatient Progress Note    PATIENT NAME: Josh Klein  : 1970  MRN: [id number]    CHIEF COMPLAINT: Acute thrombocytopenia    HISTORY OF PRESENT ILLNESS:    50 y.o. male presented to [facility] ER on 2021 reporting a need for hemodialysis.  He is an undocumented immigrant and has not been able to obtain insurance to receive hemodialysis as an outpatient.  He reported weakness dyspnea on exertion and a low blood pressure for the past 24 hours.  His chest x-ray was unremarkable. WBC 7.2, hemoglobin 10.3, MCV 94.2 and platelets 269,000.  His platelets started declining the following day, and were 33,000 on 2021.  PT 10.7 (9.6-11.7, PTT 25.2 (24-31), vitamin B12 424 (211-946), and LFTs were normal. According to the medical record, he was diagnosed with end-stage renal disease at [facility] on 2021 and was started on hemodialysis.  Since that time he has been unable to obtain insurance coverage for treatment of his renal disease.     21  Hematology/Oncology was consulted by Dr. Nayak for acute thrombocytopenia.  He denied any history of bleeding or low blood counts.     Past Medical History: End-stage renal disease and hypertension.  Surgical History:  Vascular surgery.  Social History: He lives in [place], [place] with his brother's family.  He has worked in construction.  He does not smoke or use alcohol or other recreational drugs.  Family History: His mother had hypertension.  Allergies: No known allergies.     PCP: Provider, No Known     INTERVAL HISTORY:  • 2021 -  Prednisone 60 mg by mouth daily was started on 2021.White blood count 7.8, hemoglobin 10.6, MCV 93.7, platelets 56,000. Folate 4.34 (4.78-24.20), iron 68 (), iron saturation 21 (20-50) transferrin 215 (200-360), TIBC 320 (298-536), reticulocytes 0.55 (0.7-1.9), haptoglobin 122 (). Started folic acid 1 mg p.o. once daily.  Received Venofer 200 mg IV and Retacrit  30,000 units subcu x1 dose per nephrology on 5/31/2021 with hemodialysis.  Received heparin with dialysis.   • 6/2/2021- On 6/1/2021 1848 platelets 22,000, CMV IgM <30.0 (<30.0), erythropoietin 9.4 (2.6-18.5).   · 6/3/2021 -  EBV IgM <36.0 (<36.0), copper 101 (), and SPEP with no M spike or monoclonal protein is not present.   · 6/4/2021-platelet count 68,000.  HIT 0.133 (0-0.4) however low platelet count felt secondary to heparin.  Prednisone decreased to 40 mg by mouth daily.  Holding heparin with dialysis.  · 6/5/2021-platelets 128,000.  Prednisone discontinued.    History of present illness reviewed since last visit and changes noted on 06/05/21.    Subjective   Feeling well.  Waiting on finding outpatient dialysis that will accept him without insurance.    ROS:  Review of Systems   Constitutional: Negative for activity change, chills, fatigue, fever and unexpected weight change.   HENT: Negative for congestion, dental problem, hearing loss, mouth sores, nosebleeds, sore throat and trouble swallowing.    Eyes: Negative for photophobia and visual disturbance.   Respiratory: Negative for cough, chest tightness and shortness of breath.    Cardiovascular: Negative for chest pain, palpitations and leg swelling.   Gastrointestinal: Negative for abdominal distention, abdominal pain, blood in stool, constipation, diarrhea, nausea and vomiting.   Endocrine: Negative for cold intolerance and heat intolerance.   Genitourinary: Negative for decreased urine volume, difficulty urinating, frequency, hematuria and urgency.   Musculoskeletal: Negative for arthralgias and gait problem.   Skin: Negative for rash and wound.   Neurological: Negative for dizziness, tremors, seizures, weakness, light-headedness, numbness and headaches.   Hematological: Negative for adenopathy. Does not bruise/bleed easily.   Psychiatric/Behavioral: Negative for confusion and hallucinations. The patient is not nervous/anxious.    All other  "systems reviewed and are negative.     MEDICATIONS:    Scheduled Meds:  calcitriol, 0.25 mcg, Oral, Daily  cloNIDine, 0.1 mg, Oral, TID  famotidine, 10 mg, Oral, Daily  folic acid, 1 mg, Oral, Daily  losartan, 25 mg, Oral, Daily  predniSONE, 40 mg, Oral, Daily With Breakfast  sevelamer, 800 mg, Oral, TID With Meals  sodium bicarbonate, 650 mg, Oral, TID  sodium chloride, 10 mL, Intravenous, Q12H       Continuous Infusions:      PRN Meds:  •  acetaminophen **OR** acetaminophen **OR** acetaminophen  •  acetaminophen  •  albumin human  •  labetalol  •  melatonin  •  nitroglycerin  •  ondansetron **OR** ondansetron  •  pharmacy  •  sodium chloride     ALLERGIES:    Allergies   Allergen Reactions   • Heparin Other (See Comments)     Patient is thrombocytopenic.  Avoiding heparin at this time.       Objective    VITALS:   /80 (BP Location: Left arm, Patient Position: Lying)   Pulse 86   Temp 98.2 °F (36.8 °C) (Oral)   Resp 17   Ht 162.6 cm (64\")   Wt 60.8 kg (134 lb 0.6 oz)   SpO2 98%   BMI 23.01 kg/m²     PHYSICAL EXAM:  Physical Exam  Vitals and nursing note reviewed.   Constitutional:       General: He is not in acute distress.     Appearance: Normal appearance. He is well-developed. He is not diaphoretic.   HENT:      Head: Normocephalic and atraumatic.      Nose: Nose normal.      Mouth/Throat:      Mouth: Mucous membranes are moist.      Pharynx: Oropharynx is clear. No oropharyngeal exudate or posterior oropharyngeal erythema.      Comments: Dental fillings  Eyes:      General: No scleral icterus.     Extraocular Movements: Extraocular movements intact.      Conjunctiva/sclera: Conjunctivae normal.      Pupils: Pupils are equal, round, and reactive to light.      Comments: Eyeglasses   Cardiovascular:      Rate and Rhythm: Normal rate and regular rhythm.      Heart sounds: Normal heart sounds. No murmur heard.        Comments: Right chest wall Shiley.  Monitor leads.  Pulmonary:      Effort: Pulmonary " effort is normal. No respiratory distress.      Breath sounds: Normal breath sounds. No wheezing or rales.   Abdominal:      General: Bowel sounds are normal. There is no distension.      Palpations: Abdomen is soft. There is no mass.      Tenderness: There is no abdominal tenderness. There is no guarding.   Genitourinary:     Comments: Deferred   Musculoskeletal:         General: No swelling, tenderness or deformity. Normal range of motion.      Cervical back: Normal range of motion and neck supple.      Right lower leg: No edema.      Left lower leg: No edema.      Comments: LUE BP cuff and IV   Lymphadenopathy:      Cervical: No cervical adenopathy.      Upper Body:      Right upper body: No supraclavicular adenopathy.      Left upper body: No supraclavicular adenopathy.   Skin:     General: Skin is warm and dry.      Coloration: Skin is not jaundiced or pale.      Findings: No bruising, erythema or rash.   Neurological:      General: No focal deficit present.      Mental Status: He is alert and oriented to person, place, and time.      Coordination: Coordination normal.   Psychiatric:         Mood and Affect: Mood normal.         Behavior: Behavior normal.         Thought Content: Thought content normal.         RECENT LABS:  Lab Results (last 24 hours)     Procedure Component Value Units Date/Time    Basic Metabolic Panel [[id number]]  (Abnormal) Collected: 06/05/21 0301    Specimen: Blood Updated: 06/05/21 0355     Glucose 141 mg/dL      BUN 67 mg/dL      Creatinine 9.17 mg/dL      Sodium 134 mmol/L      Potassium 4.8 mmol/L      Comment: Slight hemolysis detected by analyzer. Results may be affected.        Chloride 93 mmol/L      CO2 22.0 mmol/L      Calcium 8.5 mg/dL      eGFR  African Amer 7 mL/min/1.73      Comment: <15 Indicative of kidney failure.        eGFR Non African Amer 6 mL/min/1.73      Comment: <15 Indicative of kidney failure.        BUN/Creatinine Ratio 7.3     Anion Gap 19.0 mmol/L      Narrative:      GFR Normal >60  Chronic Kidney Disease <60  Kidney Failure <15      CBC & Differential [[id number]]  (Abnormal) Collected: 06/05/21 0301    Specimen: Blood Updated: 06/05/21 0334    Narrative:      The following orders were created for panel order CBC & Differential.  Procedure                               Abnormality         Status                     ---------                               -----------         ------                     CBC Auto Differential[[id number]]        Abnormal            Final result                 Please view results for these tests on the individual orders.    CBC Auto Differential [[id number]]  (Abnormal) Collected: 06/05/21 0301    Specimen: Blood Updated: 06/05/21 0334     WBC 13.20 10*3/mm3      RBC 3.54 10*6/mm3      Hemoglobin 11.1 g/dL      Hematocrit 33.1 %      MCV 93.4 fL      MCH 31.2 pg      MCHC 33.4 g/dL      RDW 14.5 %      RDW-SD 47.3 fl      MPV 9.9 fL      Platelets 128 10*3/mm3      Comment: Result checked         Neutrophil % 76.3 %      Lymphocyte % 12.6 %      Monocyte % 10.0 %      Eosinophil % 0.3 %      Basophil % 0.8 %      Neutrophils, Absolute 10.10 10*3/mm3      Lymphocytes, Absolute 1.70 10*3/mm3      Monocytes, Absolute 1.30 10*3/mm3      Eosinophils, Absolute 0.00 10*3/mm3      Basophils, Absolute 0.10 10*3/mm3      nRBC 0.1 /100 WBC         PENDING RESULTS: n/a    IMAGING REVIEWED:  No radiology results for the last day    I have reviewed the patient's labs, imaging, reports, and other clinician documentation.    Assessment/Plan   ASSESSMENT  1. Thrombocytopenia: Acute and had been significantly low.  No overt bleeding.  LFTs and coags normal.  Only possible culprit drug is heparin with dialysis and folate deficiency. CMV and EBV IgMs were negative. Started treatment with oral steroids on 5/31/2021 with improvement in platelet count noted initially but platelets declined after dialysis and use of heparin.  Received heparin  with dialysis on  6/1/2021. Heparin antibodies negative but course compatible with heparin-induced thrombocytopenia.  Prednisone discontinued 6/4/2021 with increase in platelets 128,000.  2. Anemia/Folate deficiency anemia: Anemia probably chronic due to ESRD.  Anemia work-up revealed folate deficiency. Received Venofer and epo on 5/31/2021 with dialysis per nephrology.  Started on folate supplementation.  3. ESRD/Chronic kidney disease on chronic dialysis/Essential hypertension: On hemodialysis per nephrology. No heparin with HD due to thrombocytopenia. Needs emergency medicaid for outpatient HD.   4. Uninsured status:  consulted.     PLAN  1. Continue to hold all heparin including heparin with hemodialysis.  2. Ensure outpatient dialysis center is aware to hold heparin.  3. Discontinue prednisone and Pepcid.  4. Follow CBC.   5. Continue folic acid 1 mg p.o. once daily.  6. Continue EPO and Venofer per nephrology with hemodialysis.  7. Outpatient platelet antibodies if platelets remain low or thrombocytopenia recurs.    Note prepared by HONEY Guzman.  Patient seen and examined by Devan Bray MD.  Electronically signed by LAISHA Lilly, 06/05/21, 10:41 AM EDT.        I have personally performed a face-to-face diagnostic evaluation on this patient.  I have discussed the case with Ksenia Rodríguez NP, have edited/reviewed the note, and agree with the care plan.  The patient claims to be feeling fine.  On examination, he has left arm IV and right chest wall Shiley catheter.  Labs are significant for increase in platelet count 228,000.  Picture is consistent with heparin-induced thrombocytopenia.  Prednisone discontinued.  Would recommend no heparin use with outpatient dialysis.        I discussed the patients findings and my recommendations with patient.    Electronically signed by Devan Bray MD, 06/05/21, 10:54 AM EDT.

## 2021-06-06 LAB
BASOPHILS # BLD AUTO: 0.1 10*3/MM3 (ref 0–0.2)
BASOPHILS NFR BLD AUTO: 0.4 % (ref 0–1.5)
DEPRECATED RDW RBC AUTO: 48.6 FL (ref 37–54)
EOSINOPHIL # BLD AUTO: 0.6 10*3/MM3 (ref 0–0.4)
EOSINOPHIL NFR BLD AUTO: 5.1 % (ref 0.3–6.2)
ERYTHROCYTE [DISTWIDTH] IN BLOOD BY AUTOMATED COUNT: 14.8 % (ref 12.3–15.4)
HCT VFR BLD AUTO: 32.8 % (ref 37.5–51)
HGB BLD-MCNC: 10.8 G/DL (ref 13–17.7)
LYMPHOCYTES # BLD AUTO: 3.8 10*3/MM3 (ref 0.7–3.1)
LYMPHOCYTES NFR BLD AUTO: 30 % (ref 19.6–45.3)
MCH RBC QN AUTO: 31.1 PG (ref 26.6–33)
MCHC RBC AUTO-ENTMCNC: 33.1 G/DL (ref 31.5–35.7)
MCV RBC AUTO: 94 FL (ref 79–97)
MONOCYTES # BLD AUTO: 1.3 10*3/MM3 (ref 0.1–0.9)
MONOCYTES NFR BLD AUTO: 10.1 % (ref 5–12)
NEUTROPHILS NFR BLD AUTO: 54.4 % (ref 42.7–76)
NEUTROPHILS NFR BLD AUTO: 6.8 10*3/MM3 (ref 1.7–7)
NRBC BLD AUTO-RTO: 0.1 /100 WBC (ref 0–0.2)
PLATELET # BLD AUTO: 80 10*3/MM3 (ref 140–450)
PMV BLD AUTO: 9.1 FL (ref 6–12)
RBC # BLD AUTO: 3.48 10*6/MM3 (ref 4.14–5.8)
WBC # BLD AUTO: 12.6 10*3/MM3 (ref 3.4–10.8)

## 2021-06-06 PROCEDURE — 85025 COMPLETE CBC W/AUTO DIFF WBC: CPT | Performed by: NURSE PRACTITIONER

## 2021-06-06 PROCEDURE — 99233 SBSQ HOSP IP/OBS HIGH 50: CPT | Performed by: INTERNAL MEDICINE

## 2021-06-06 PROCEDURE — 63710000001 PREDNISONE PER 1 MG: Performed by: INTERNAL MEDICINE

## 2021-06-06 RX ORDER — FAMOTIDINE 20 MG/1
20 TABLET, FILM COATED ORAL DAILY
Status: DISCONTINUED | OUTPATIENT
Start: 2021-06-06 | End: 2021-06-09 | Stop reason: HOSPADM

## 2021-06-06 RX ORDER — PREDNISONE 20 MG/1
40 TABLET ORAL
Status: DISCONTINUED | OUTPATIENT
Start: 2021-06-06 | End: 2021-06-07

## 2021-06-06 RX ADMIN — PREDNISONE 40 MG: 20 TABLET ORAL at 12:21

## 2021-06-06 RX ADMIN — FAMOTIDINE 20 MG: 20 TABLET ORAL at 12:21

## 2021-06-06 RX ADMIN — Medication 10 ML: at 19:52

## 2021-06-06 RX ADMIN — SODIUM BICARBONATE 650 MG: 650 TABLET ORAL at 08:40

## 2021-06-06 RX ADMIN — FOLIC ACID 1 MG: 1 TABLET ORAL at 08:40

## 2021-06-06 RX ADMIN — CLONIDINE HYDROCHLORIDE 0.1 MG: 0.1 TABLET ORAL at 16:07

## 2021-06-06 RX ADMIN — SEVELAMER CARBONATE 800 MG: 800 TABLET, FILM COATED ORAL at 17:14

## 2021-06-06 RX ADMIN — SEVELAMER CARBONATE 800 MG: 800 TABLET, FILM COATED ORAL at 12:21

## 2021-06-06 RX ADMIN — Medication 10 ML: at 08:40

## 2021-06-06 RX ADMIN — LOSARTAN POTASSIUM 25 MG: 25 TABLET, FILM COATED ORAL at 08:40

## 2021-06-06 RX ADMIN — CLONIDINE HYDROCHLORIDE 0.1 MG: 0.1 TABLET ORAL at 19:52

## 2021-06-06 RX ADMIN — SEVELAMER CARBONATE 800 MG: 800 TABLET, FILM COATED ORAL at 08:40

## 2021-06-06 RX ADMIN — CLONIDINE HYDROCHLORIDE 0.1 MG: 0.1 TABLET ORAL at 08:40

## 2021-06-06 RX ADMIN — CALCITRIOL CAPSULES 0.25 MCG 0.25 MCG: 0.25 CAPSULE ORAL at 08:40

## 2021-06-06 RX ADMIN — SODIUM BICARBONATE 650 MG: 650 TABLET ORAL at 19:52

## 2021-06-06 RX ADMIN — SODIUM BICARBONATE 650 MG: 650 TABLET ORAL at 16:07

## 2021-06-06 NOTE — PROGRESS NOTES
Nephrology Note    Chart reviewed. No labs today. Had HD yesterday.   Outpatient dialysis chair has not been secured yet. Will follow.   For now continue with HD TTS, no heparin with HD due to thrombocytopenia  Will see him in am.     Paris Bazzi MD  [facility].

## 2021-06-06 NOTE — PLAN OF CARE
Goal Outcome Evaluation:  Plan of Care Reviewed With: patient  Progress: improving  Outcome Summary: Pt waiting on dialysis to be set up for discharge.

## 2021-06-06 NOTE — PLAN OF CARE
Goal Outcome Evaluation:     Progress: no change  Outcome Summary: pt is currently resting in bed with no complaints of pain. pt is just waiting for dialysis to be set so he can discharge.

## 2021-06-06 NOTE — PROGRESS NOTES
Hematology/Oncology Inpatient Progress Note    PATIENT NAME: Josh Klein  : 1970  MRN: [id number]    CHIEF COMPLAINT: Acute thrombocytopenia    HISTORY OF PRESENT ILLNESS:    50 y.o. male presented to [facility] ER on 2021 reporting a need for hemodialysis.  He is an undocumented immigrant and has not been able to obtain insurance to receive hemodialysis as an outpatient.  He reported weakness dyspnea on exertion and a low blood pressure for the past 24 hours.  His chest x-ray was unremarkable. WBC 7.2, hemoglobin 10.3, MCV 94.2 and platelets 269,000.  His platelets started declining the following day, and were 33,000 on 2021.  PT 10.7 (9.6-11.7, PTT 25.2 (24-31), vitamin B12 424 (211-946), and LFTs were normal. According to the medical record, he was diagnosed with end-stage renal disease at [facility] on 2021 and was started on hemodialysis.  Since that time he has been unable to obtain insurance coverage for treatment of his renal disease.     21  Hematology/Oncology was consulted by Dr. Nayak for acute thrombocytopenia.  He denied any history of bleeding or low blood counts.     Past Medical History: End-stage renal disease and hypertension.  Surgical History:  Vascular surgery.  Social History: He lives in [place], [place] with his brother's family.  He has worked in construction.  He does not smoke or use alcohol or other recreational drugs.  Family History: His mother had hypertension.  Allergies: No known allergies.     PCP: Provider, No Known     INTERVAL HISTORY:  • 2021 -  Prednisone 60 mg by mouth daily was started on 2021.White blood count 7.8, hemoglobin 10.6, MCV 93.7, platelets 56,000. Folate 4.34 (4.78-24.20), iron 68 (), iron saturation 21 (20-50) transferrin 215 (200-360), TIBC 320 (298-536), reticulocytes 0.55 (0.7-1.9), haptoglobin 122 (). Started folic acid 1 mg p.o. once daily.  Received Venofer 200 mg IV and Retacrit  30,000 units subcu x1 dose per nephrology on 5/31/2021 with hemodialysis.  Received heparin with dialysis.   • 6/2/2021- On 6/1/2021 1848 platelets 22,000, CMV IgM <30.0 (<30.0), erythropoietin 9.4 (2.6-18.5).   · 6/3/2021 -  EBV IgM <36.0 (<36.0), copper 101 (), and SPEP with no M spike or monoclonal protein is not present.   · 6/4/2021-platelet count 68,000.  HIT 0.133 (0-0.4) however low platelet count felt secondary to heparin.  Prednisone decreased to 40 mg by mouth daily.  Holding heparin with dialysis.  · 6/5/2021-platelets 128,000.  Prednisone discontinued.   · 6/6/2021-platelets dropped to 80,000.  Prednisone 40 mg p.o. daily started.    History of present illness reviewed since last visit and changes noted on 06/06/21.    Subjective   Denies any problems.    ROS:  Review of Systems   Constitutional: Negative for activity change, chills, fatigue, fever and unexpected weight change.   HENT: Negative for congestion, dental problem, hearing loss, mouth sores, nosebleeds, sore throat and trouble swallowing.    Eyes: Negative for photophobia and visual disturbance.   Respiratory: Negative for cough, chest tightness and shortness of breath.    Cardiovascular: Negative for chest pain, palpitations and leg swelling.   Gastrointestinal: Negative for abdominal distention, abdominal pain, blood in stool, constipation, diarrhea, nausea and vomiting.   Endocrine: Negative for cold intolerance and heat intolerance.   Genitourinary: Negative for decreased urine volume, difficulty urinating, dysuria, frequency, hematuria and urgency.   Musculoskeletal: Negative for arthralgias and gait problem.   Skin: Negative for rash and wound.   Neurological: Negative for dizziness, tremors, seizures, weakness, light-headedness, numbness and headaches.   Hematological: Negative for adenopathy. Does not bruise/bleed easily.   Psychiatric/Behavioral: Negative for confusion and hallucinations. The patient is not nervous/anxious.   "  All other systems reviewed and are negative.     MEDICATIONS:    Scheduled Meds:  calcitriol, 0.25 mcg, Oral, Daily  cloNIDine, 0.1 mg, Oral, TID  folic acid, 1 mg, Oral, Daily  losartan, 25 mg, Oral, Daily  sevelamer, 800 mg, Oral, TID With Meals  sodium bicarbonate, 650 mg, Oral, TID  sodium chloride, 10 mL, Intravenous, Q12H       Continuous Infusions:      PRN Meds:  •  acetaminophen **OR** acetaminophen **OR** acetaminophen  •  acetaminophen  •  labetalol  •  melatonin  •  nitroglycerin  •  ondansetron **OR** ondansetron  •  pharmacy  •  sodium chloride     ALLERGIES:    Allergies   Allergen Reactions   • Heparin Other (See Comments)     Patient is thrombocytopenic.  Avoiding heparin at this time.       Objective    VITALS:   /79 (BP Location: Left arm, Patient Position: Sitting)   Pulse 88   Temp 98.5 °F (36.9 °C) (Oral)   Resp 17   Ht 162.6 cm (64\")   Wt 60 kg (132 lb 4.4 oz)   SpO2 97%   BMI 22.71 kg/m²     PHYSICAL EXAM:  Physical Exam  Vitals and nursing note reviewed.   Constitutional:       General: He is not in acute distress.     Appearance: Normal appearance. He is well-developed. He is not diaphoretic.   HENT:      Head: Normocephalic and atraumatic.      Nose: Nose normal.      Mouth/Throat:      Mouth: Mucous membranes are moist.      Pharynx: Oropharynx is clear. No oropharyngeal exudate or posterior oropharyngeal erythema.      Comments: Dental fillings  Eyes:      General: No scleral icterus.     Extraocular Movements: Extraocular movements intact.      Conjunctiva/sclera: Conjunctivae normal.      Pupils: Pupils are equal, round, and reactive to light.      Comments: Eyeglasses   Cardiovascular:      Rate and Rhythm: Normal rate and regular rhythm.      Heart sounds: Normal heart sounds. No murmur heard.        Comments: Right chest wall Shiley.  Monitor leads.  Pulmonary:      Effort: Pulmonary effort is normal. No respiratory distress.      Breath sounds: Normal breath sounds. " No stridor. No wheezing or rales.   Abdominal:      General: Bowel sounds are normal. There is no distension.      Palpations: Abdomen is soft. There is no mass.      Tenderness: There is no abdominal tenderness. There is no guarding.   Genitourinary:     Comments: Deferred   Musculoskeletal:         General: No swelling, tenderness or deformity. Normal range of motion.      Cervical back: Normal range of motion and neck supple.      Right lower leg: No edema.      Left lower leg: No edema.      Comments: LUE BP cuff and IV   Lymphadenopathy:      Cervical: No cervical adenopathy.      Upper Body:      Right upper body: No supraclavicular adenopathy.      Left upper body: No supraclavicular adenopathy.   Skin:     General: Skin is warm and dry.      Coloration: Skin is not jaundiced or pale.      Findings: No bruising, erythema or rash.   Neurological:      General: No focal deficit present.      Mental Status: He is alert and oriented to person, place, and time.      Coordination: Coordination normal.   Psychiatric:         Mood and Affect: Mood normal.         Behavior: Behavior normal.         Thought Content: Thought content normal.         RECENT LABS:  Lab Results (last 24 hours)     Procedure Component Value Units Date/Time    CBC & Differential [[id number]]  (Abnormal) Collected: 06/06/21 0327    Specimen: Blood Updated: 06/06/21 0413    Narrative:      The following orders were created for panel order CBC & Differential.  Procedure                               Abnormality         Status                     ---------                               -----------         ------                     CBC Auto Differential[[id number]]        Abnormal            Final result                 Please view results for these tests on the individual orders.    CBC Auto Differential [[id number]]  (Abnormal) Collected: 06/06/21 0327    Specimen: Blood Updated: 06/06/21 0413     WBC 12.60 10*3/mm3      RBC 3.48 10*6/mm3       Hemoglobin 10.8 g/dL      Hematocrit 32.8 %      MCV 94.0 fL      MCH 31.1 pg      MCHC 33.1 g/dL      RDW 14.8 %      RDW-SD 48.6 fl      MPV 9.1 fL      Platelets 80 10*3/mm3      Neutrophil % 54.4 %      Lymphocyte % 30.0 %      Monocyte % 10.1 %      Eosinophil % 5.1 %      Basophil % 0.4 %      Neutrophils, Absolute 6.80 10*3/mm3      Lymphocytes, Absolute 3.80 10*3/mm3      Monocytes, Absolute 1.30 10*3/mm3      Eosinophils, Absolute 0.60 10*3/mm3      Basophils, Absolute 0.10 10*3/mm3      nRBC 0.1 /100 WBC         PENDING RESULTS: n/a    IMAGING REVIEWED:  No radiology results for the last day    I have reviewed the patient's labs, imaging, reports, and other clinician documentation.    Assessment/Plan   ASSESSMENT  1. Thrombocytopenia: Acute and had been significantly low.  No overt bleeding.  LFTs and coags normal.  Only possible culprit drug is heparin with dialysis and he has folate deficiency. CMV and EBV IgMs were negative. Started treatment with oral steroids on 5/31/2021 with improvement in platelet count noted initially but platelets declined after dialysis and use of heparin.  Received heparin  with dialysis on 6/1/2021. 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  Prednisone discontinued 6/4/2021 with increase in platelets 128,000, but restarted next day for platelet count drop to 80,000.  2. Anemia/Folate deficiency anemia: Anemia probably chronic due to ESRD.  Anemia work-up revealed folate deficiency. Received Venofer and epo on 5/31/2021 with dialysis per nephrology.  Started on folate supplementation.  3. ESRD/Chronic kidney disease on chronic dialysis/Essential hypertension: On hemodialysis per nephrology. No heparin with HD due to thrombocytopenia. Needs emergency medicaid for outpatient HD.   4. Uninsured status:  consulted.     PLAN  1. Continue to hold all heparin including heparin with hemodialysis.  2. Ensure outpatient  dialysis center is aware to hold heparin.  3. Restart prednisone and Pepcid.  4. Follow CBC.   5. Continue folic acid 1 mg p.o. once daily.  6. Continue EPO and Venofer per nephrology with hemodialysis.  7. Outpatient platelet antibodies.                I discussed the patients findings and my recommendations with patient.    Electronically signed by Devan Bray MD, 06/06/21, 11:11 AM EDT.

## 2021-06-06 NOTE — PROGRESS NOTES
[facility] Medicine Services Daily Progress Note      Hospitalist Team  LOS 5 days      Patient Care Team:  Provider, No Known as PCP - Devan Drew MD as Consulting Physician (Hematology and Oncology)    Patient Location: 311/1      Subjective   Subjective     Chief Complaint / Subjective  Chief Complaint   Patient presents with   • Hypotension     pt seen here on Sunday to receive dialysis-pt without insurance and recently started receiving dialysis with the hospital, pt has not received dialysis since last Sunday. pt denies any pain, swelling or SOA         Brief Synopsis of Hospital Course/HPI    Patient is a 50-year-old male with past medical history of end-stage renal disease on hemodialysis, hypertension, anemia of chronic kidney disease who presented to the emergency room because of hypotension.  Patient was recently in the hospital and completed his hemodialysis.  Patient is unable to get dialysis because of his lack of insurance.  Patient needs emergent dialysis and was recommended for admission for further treatment and management.  Nephrology consult was completed.  Hematology consult was completed because of thrombocytopenia.    No overnight events noted.      Date:: 6/6/2021.        Review of Systems   Constitutional: Negative.   HENT: Negative.    Eyes: Negative.    Cardiovascular: Negative.    Respiratory: Negative.    Endocrine: Negative.    Hematologic/Lymphatic: Negative.    Skin: Negative.    Musculoskeletal: Negative.    Gastrointestinal: Negative.    Genitourinary: Negative.    Neurological: Negative.    Psychiatric/Behavioral: Negative.    Allergic/Immunologic: Negative.          Objective   Objective      Vital Signs  Temp:  [98.2 °F (36.8 °C)-98.6 °F (37 °C)] 98.5 °F (36.9 °C)  Heart Rate:  [70-94] 88  Resp:  [16-18] 17  BP: (122-153)/(79-90) 122/79  Oxygen Therapy  SpO2: 97 %  Pulse Oximetry Type: Intermittent  Device (Oxygen Therapy): room  "air  Flowsheet Rows      First Filed Value   Admission Height  162.6 cm (64\") Documented at 05/29/2021 1252   Admission Weight  61.9 kg (136 lb 7.4 oz) Documented at 05/29/2021 1252        Intake & Output (last 3 days)       06/03 0701 - 06/04 0700 06/04 0701 - 06/05 0700 06/05 0701 - 06/06 0700 06/06 0701 - 06/07 0700    P.O. 240 720      Total Intake(mL/kg) 240 (3.9) 720 (11.8)      Urine (mL/kg/hr) 0 (0)       Other 1000  1000     Total Output 1000  1000     Net -760 +720 -1000             Urine Unmeasured Occurrence 1 x 1 x 1 x         Lines, Drains & Airways    Active LDAs     Name:   Placement date:   Placement time:   Site:   Days:    CVC Double Lumen 05/06/21 Tunneled Right Subclavian   05/06/21    --    Subclavian   25    Peripheral IV 05/29/21 1340 Left Antecubital   05/29/21    1340    Antecubital   1                  Physical Exam:    Physical Exam  Vitals and nursing note reviewed.   Constitutional:       General: He is not in acute distress.     Comments: Patient is lying comfortably in bed.   HENT:      Head: Normocephalic.      Nose: Nose normal.      Mouth/Throat:      Mouth: Mucous membranes are dry.      Pharynx: Oropharynx is clear.   Eyes:      Extraocular Movements: Extraocular movements intact.      Conjunctiva/sclera: Conjunctivae normal.      Pupils: Pupils are equal, round, and reactive to light.   Cardiovascular:      Pulses: Normal pulses.      Heart sounds: No murmur heard.   No friction rub. No gallop.       Comments: S1 and S2 present.  No tachycardia.  Pulmonary:      Effort: Pulmonary effort is normal.      Breath sounds: No stridor. No wheezing or rales.      Comments: Good air entry bilaterally.  No wheezing.  Chest:      Chest wall: No tenderness.   Abdominal:      General: Bowel sounds are normal. There is no distension.      Palpations: Abdomen is soft.      Tenderness: There is no abdominal tenderness. There is no right CVA tenderness or guarding.   Musculoskeletal:         " General: No swelling, tenderness, deformity or signs of injury.      Cervical back: Normal range of motion. No rigidity.      Right lower leg: No edema.      Left lower leg: No edema.   Skin:     General: Skin is warm and dry.      Capillary Refill: Capillary refill takes less than 2 seconds.      Coloration: Skin is not jaundiced.      Findings: No bruising, erythema, lesion or rash.   Neurological:      General: No focal deficit present.   Psychiatric:      Comments: No agitation.               Procedures:              Results Review:     I reviewed the patient's new clinical results.      Lab Results (last 24 hours)     Procedure Component Value Units Date/Time    CBC & Differential [[id number]]  (Abnormal) Collected: 06/06/21 0327    Specimen: Blood Updated: 06/06/21 0413    Narrative:      The following orders were created for panel order CBC & Differential.  Procedure                               Abnormality         Status                     ---------                               -----------         ------                     CBC Auto Differential[[id number]]        Abnormal            Final result                 Please view results for these tests on the individual orders.    CBC Auto Differential [[id number]]  (Abnormal) Collected: 06/06/21 0327    Specimen: Blood Updated: 06/06/21 0413     WBC 12.60 10*3/mm3      RBC 3.48 10*6/mm3      Hemoglobin 10.8 g/dL      Hematocrit 32.8 %      MCV 94.0 fL      MCH 31.1 pg      MCHC 33.1 g/dL      RDW 14.8 %      RDW-SD 48.6 fl      MPV 9.1 fL      Platelets 80 10*3/mm3      Neutrophil % 54.4 %      Lymphocyte % 30.0 %      Monocyte % 10.1 %      Eosinophil % 5.1 %      Basophil % 0.4 %      Neutrophils, Absolute 6.80 10*3/mm3      Lymphocytes, Absolute 3.80 10*3/mm3      Monocytes, Absolute 1.30 10*3/mm3      Eosinophils, Absolute 0.60 10*3/mm3      Basophils, Absolute 0.10 10*3/mm3      nRBC 0.1 /100 WBC         No results found for: HGBA1C            No results  found for: LIPASE  No results found for: CHOL, CHLPL, TRIG, HDL, LDL, LDLDIRECT    No results found for: INTRAOP, PREDX, FINALDX, COMDX    Microbiology Results (last 10 days)     Procedure Component Value - Date/Time    COVID PRE-OP / PRE-PROCEDURE SCREENING ORDER (NO ISOLATION) - Swab, Nasopharynx [[id number]]  (Normal) Collected: 05/29/21 1634    Lab Status: Final result Specimen: Swab from Nasopharynx Updated: 05/29/21 1703    Narrative:      The following orders were created for panel order COVID PRE-OP / PRE-PROCEDURE SCREENING ORDER (NO ISOLATION) - Swab, Nasopharynx.  Procedure                               Abnormality         Status                     ---------                               -----------         ------                     COVID-19,CEPHEID,COR/DARRYN...[[id number]]  Normal              Final result                 Please view results for these tests on the individual orders.    COVID-19,CEPHEID,COR/DARRYN/PAD IN-HOUSE(OR EMERGENT/ADD-ON),NP SWAB IN TRANSPORT MEDIA 3-4 HR TAT, RT-PCR - Swab, Nasopharynx [[id number]]  (Normal) Collected: 05/29/21 1634    Lab Status: Final result Specimen: Swab from Nasopharynx Updated: 05/29/21 1703     COVID19 Not Detected    Narrative:      Fact sheet for providers: https://www.fda.gov/media/571604/download     Fact sheet for patients: https://www.fda.gov/media/334996/download  Fact sheet for providers: https://www.fda.gov/media/542769/download    Fact sheet for patients: https://www.fda.gov/media/126604/download    Test performed by PCR.          ECG/EMG Results (most recent)     Procedure Component Value Units Date/Time    ECG 12 Lead [[id number]] Collected: 05/29/21 1333     Updated: 05/30/21 0821     QT Interval 413 ms     Narrative:      HEART RATE= 70  bpm  RR Interval= 864  ms  IN Interval= 199  ms  P Horizontal Axis= -29  deg  P Front Axis= 47  deg  QRSD Interval= 110  ms  QT Interval= 413  ms  QRS Axis= -48  deg  T Wave Axis= 45  deg  - BORDERLINE ECG -  Sinus  rhythm  Probable left ventricular hypertrophy  When compared with ECG of 23-May-2021 12:49:50,  Significant repolarization change  Electronically Signed By: Román Mcnair (Joseph) 30-May-2021 08:20:51  Date and Time of Study: 2021-05-29 13:33:44          Results for orders placed during the hospital encounter of 05/29/21    Duplex Hemodialysis Access CAR    Interpretation Summary  · Patent right radiocephalic AV fistula with an anastomotic stenosis and adequate volume flow. 4 mm cephalic vein tributary proximally.          No radiology results for the last 7 days        Xrays, labs reviewed personally by physician.    Medication Review:   I have reviewed the patient's current medication list      Scheduled Meds  calcitriol, 0.25 mcg, Oral, Daily  cloNIDine, 0.1 mg, Oral, TID  famotidine, 20 mg, Oral, Daily  folic acid, 1 mg, Oral, Daily  losartan, 25 mg, Oral, Daily  predniSONE, 40 mg, Oral, Daily With Breakfast  sevelamer, 800 mg, Oral, TID With Meals  sodium bicarbonate, 650 mg, Oral, TID  sodium chloride, 10 mL, Intravenous, Q12H        Meds Infusions       Meds PRN  •  acetaminophen **OR** acetaminophen **OR** acetaminophen  •  acetaminophen  •  labetalol  •  melatonin  •  nitroglycerin  •  ondansetron **OR** ondansetron  •  pharmacy  •  sodium chloride        Assessment/Plan   Assessment/Plan     Active Hospital Problems    Diagnosis  POA   • **ESRD (end stage renal disease) on dialysis (CMS/[facility]) [N18.6, Z99.2]  Not Applicable     Priority: High  Follow nephrology recommendations.  Continue hemodialysis.     • Anemia, chronic disease [D63.8]  Yes     Priority: Medium  Monitor H&H.    Thrombocytopenia  Follow hematology oncology recommendations.     • Essential hypertension [I10]  Yes     Priority: Medium  Treat with Catapres.     • Essential hypertension [I10]  Treat with losartan.  Follow nephrology recommendations.      Yes   • Chronic kidney disease on chronic dialysis (CMS/[facility]) [N18.6, Z99.2]  Follow  nephrology recommendations.      Thrombocytopenia.  Follow heme-onc recommendations.    DVT prophylaxis   -Heparin and SCDs.      Not Applicable      Resolved Hospital Problems    Diagnosis Date Resolved POA      Yes       MEDICAL DECISION MAKING COMPLEXITY BY PROBLEM:     Continue appropriate patient's home medications for other chronic medical conditions.  Continue the present level of care.  Patient and family agreed with the plan of care.          VTE Prophylaxis -   Mechanical Order History:      Ordered        05/30/21 1432  Place Sequential Compression Device  Once         05/30/21 1432  Maintain Sequential Compression Device  Continuous                 Pharmalogical Order History:      Ordered     Dose Route Frequency Stop    05/29/21 1710  heparin (porcine) 5000 UNIT/ML injection 5,000 Units  Status:  Discontinued      5,000 Units SC Every 12 Hours Scheduled 05/30/21 1432                  Code Status -   Code Status and Medical Interventions:   Ordered at: 05/29/21 1628     Code Status:    CPR     Medical Interventions (Level of Support Prior to Arrest):    Full       This patient has been examined wearing appropriate Personal Protective Equipment and discussed with hospital infection control department, [facility] department, infectious disease specialist and pulmonologist. 06/06/21        Discharge Planning  It may discharge in the next 1 to 2 days.        Electronically signed by Benjy Nayak MD, 06/06/21, 16:17 EDT.  [facility] Hospitalist Team

## 2021-06-07 LAB
BASOPHILS # BLD AUTO: 0.1 10*3/MM3 (ref 0–0.2)
BASOPHILS NFR BLD AUTO: 0.3 % (ref 0–1.5)
DEPRECATED RDW RBC AUTO: 48.6 FL (ref 37–54)
EOSINOPHIL # BLD AUTO: 0 10*3/MM3 (ref 0–0.4)
EOSINOPHIL NFR BLD AUTO: 0.2 % (ref 0.3–6.2)
ERYTHROCYTE [DISTWIDTH] IN BLOOD BY AUTOMATED COUNT: 14.9 % (ref 12.3–15.4)
HCT VFR BLD AUTO: 31.6 % (ref 37.5–51)
HGB BLD-MCNC: 10.4 G/DL (ref 13–17.7)
LYMPHOCYTES # BLD AUTO: 1.6 10*3/MM3 (ref 0.7–3.1)
LYMPHOCYTES NFR BLD AUTO: 10.2 % (ref 19.6–45.3)
MCH RBC QN AUTO: 31.2 PG (ref 26.6–33)
MCHC RBC AUTO-ENTMCNC: 33 G/DL (ref 31.5–35.7)
MCV RBC AUTO: 94.6 FL (ref 79–97)
MONOCYTES # BLD AUTO: 1.3 10*3/MM3 (ref 0.1–0.9)
MONOCYTES NFR BLD AUTO: 8.5 % (ref 5–12)
NEUTROPHILS NFR BLD AUTO: 12.6 10*3/MM3 (ref 1.7–7)
NEUTROPHILS NFR BLD AUTO: 80.8 % (ref 42.7–76)
NRBC BLD AUTO-RTO: 0.3 /100 WBC (ref 0–0.2)
PLATELET # BLD AUTO: 119 10*3/MM3 (ref 140–450)
PMV BLD AUTO: 9.8 FL (ref 6–12)
RBC # BLD AUTO: 3.34 10*6/MM3 (ref 4.14–5.8)
WBC # BLD AUTO: 15.6 10*3/MM3 (ref 3.4–10.8)

## 2021-06-07 PROCEDURE — 85025 COMPLETE CBC W/AUTO DIFF WBC: CPT | Performed by: NURSE PRACTITIONER

## 2021-06-07 PROCEDURE — 99233 SBSQ HOSP IP/OBS HIGH 50: CPT | Performed by: INTERNAL MEDICINE

## 2021-06-07 PROCEDURE — 63710000001 PREDNISONE PER 1 MG: Performed by: INTERNAL MEDICINE

## 2021-06-07 RX ORDER — ECHINACEA PURPUREA EXTRACT 125 MG
2 TABLET ORAL AS NEEDED
Status: DISCONTINUED | OUTPATIENT
Start: 2021-06-07 | End: 2021-06-09 | Stop reason: HOSPADM

## 2021-06-07 RX ORDER — PREDNISONE 20 MG/1
20 TABLET ORAL
Status: DISCONTINUED | OUTPATIENT
Start: 2021-06-07 | End: 2021-06-09 | Stop reason: HOSPADM

## 2021-06-07 RX ORDER — HYDRALAZINE HYDROCHLORIDE 25 MG/1
50 TABLET, FILM COATED ORAL 3 TIMES DAILY PRN
Status: DISCONTINUED | OUTPATIENT
Start: 2021-06-07 | End: 2021-06-09 | Stop reason: HOSPADM

## 2021-06-07 RX ORDER — CLONIDINE HYDROCHLORIDE 0.1 MG/1
0.3 TABLET ORAL 3 TIMES DAILY
Status: DISCONTINUED | OUTPATIENT
Start: 2021-06-07 | End: 2021-06-09 | Stop reason: HOSPADM

## 2021-06-07 RX ORDER — LOSARTAN POTASSIUM 50 MG/1
50 TABLET ORAL DAILY
Status: DISCONTINUED | OUTPATIENT
Start: 2021-06-08 | End: 2021-06-09 | Stop reason: HOSPADM

## 2021-06-07 RX ADMIN — FAMOTIDINE 20 MG: 20 TABLET ORAL at 09:04

## 2021-06-07 RX ADMIN — SODIUM BICARBONATE 650 MG: 650 TABLET ORAL at 09:04

## 2021-06-07 RX ADMIN — SODIUM BICARBONATE 650 MG: 650 TABLET ORAL at 21:46

## 2021-06-07 RX ADMIN — SEVELAMER CARBONATE 800 MG: 800 TABLET, FILM COATED ORAL at 09:00

## 2021-06-07 RX ADMIN — LABETALOL 20 MG/4 ML (5 MG/ML) INTRAVENOUS SYRINGE 25 MG: at 04:30

## 2021-06-07 RX ADMIN — CLONIDINE HYDROCHLORIDE 0.3 MG: 0.1 TABLET ORAL at 21:46

## 2021-06-07 RX ADMIN — CLONIDINE HYDROCHLORIDE 0.1 MG: 0.1 TABLET ORAL at 09:04

## 2021-06-07 RX ADMIN — PREDNISONE 20 MG: 20 TABLET ORAL at 09:04

## 2021-06-07 RX ADMIN — SODIUM BICARBONATE 650 MG: 650 TABLET ORAL at 15:45

## 2021-06-07 RX ADMIN — CALCITRIOL CAPSULES 0.25 MCG 0.25 MCG: 0.25 CAPSULE ORAL at 09:04

## 2021-06-07 RX ADMIN — SEVELAMER CARBONATE 800 MG: 800 TABLET, FILM COATED ORAL at 11:44

## 2021-06-07 RX ADMIN — LABETALOL 20 MG/4 ML (5 MG/ML) INTRAVENOUS SYRINGE 25 MG: at 11:45

## 2021-06-07 RX ADMIN — LOSARTAN POTASSIUM 25 MG: 25 TABLET, FILM COATED ORAL at 09:04

## 2021-06-07 RX ADMIN — Medication 10 ML: at 09:04

## 2021-06-07 RX ADMIN — Medication 10 ML: at 21:46

## 2021-06-07 RX ADMIN — SEVELAMER CARBONATE 800 MG: 800 TABLET, FILM COATED ORAL at 17:55

## 2021-06-07 RX ADMIN — FOLIC ACID 1 MG: 1 TABLET ORAL at 09:04

## 2021-06-07 RX ADMIN — CLONIDINE HYDROCHLORIDE 0.3 MG: 0.1 TABLET ORAL at 15:45

## 2021-06-07 RX ADMIN — Medication 2 SPRAY: at 22:42

## 2021-06-07 RX ADMIN — HYDRALAZINE HYDROCHLORIDE 50 MG: 25 TABLET, FILM COATED ORAL at 13:48

## 2021-06-07 NOTE — PROGRESS NOTES
"RENAL/KCC:     LOS: 6 days    Patient Care Team:  Provider, No Known as PCP - Devan Drew MD as Consulting Physician (Hematology and Oncology)    Chief Complaint:  ESRD    Subjective     Interval History:   Chart reviewed.  BP high this AM.  Denies any CP or SOA.    Objective     Vital Sign Min/Max for last 24 hours  Temp  Min: 97.7 °F (36.5 °C)  Max: 98.9 °F (37.2 °C)   BP  Min: 156/99  Max: 198/103   Pulse  Min: 75  Max: 103   Resp  Min: 16  Max: 18   SpO2  Min: 96 %  Max: 98 %   No data recorded   Weight  Min: 61.2 kg (134 lb 14.7 oz)  Max: 61.2 kg (134 lb 14.7 oz)     Flowsheet Rows      First Filed Value   Admission Height  162.6 cm (64\") Documented at 05/29/2021 1252   Admission Weight  61.9 kg (136 lb 7.4 oz) Documented at 05/29/2021 1252          No intake/output data recorded.  I/O last 3 completed shifts:  In: 240 [P.O.:240]  Out: -     Physical Exam:  GEN: Awake, NAD  ENT: PERRL, EOMI, MMM  NECK: Supple, no JVD  CHEST: CTAB, no W/R/C  CV: RRR, no M/G/R  ABD: Soft, NT, +BS  SKIN: Warm and Dry  NEURO: CN's intact      WBC WBC   Date Value Ref Range Status   06/07/2021 15.60 (H) 3.40 - 10.80 10*3/mm3 Final   06/06/2021 12.60 (H) 3.40 - 10.80 10*3/mm3 Final   06/05/2021 13.20 (H) 3.40 - 10.80 10*3/mm3 Final      HGB Hemoglobin   Date Value Ref Range Status   06/07/2021 10.4 (L) 13.0 - 17.7 g/dL Final   06/06/2021 10.8 (L) 13.0 - 17.7 g/dL Final   06/05/2021 11.1 (L) 13.0 - 17.7 g/dL Final      HCT Hematocrit   Date Value Ref Range Status   06/07/2021 31.6 (L) 37.5 - 51.0 % Final   06/06/2021 32.8 (L) 37.5 - 51.0 % Final   06/05/2021 33.1 (L) 37.5 - 51.0 % Final      Platlets No results found for: LABPLAT   MCV MCV   Date Value Ref Range Status   06/07/2021 94.6 79.0 - 97.0 fL Final   06/06/2021 94.0 79.0 - 97.0 fL Final   06/05/2021 93.4 79.0 - 97.0 fL Final          Sodium Sodium   Date Value Ref Range Status   06/05/2021 134 (L) 136 - 145 mmol/L Final      Potassium Potassium   Date Value " Ref Range Status   06/05/2021 4.8 3.5 - 5.2 mmol/L Final     Comment:     Slight hemolysis detected by analyzer. Results may be affected.      Chloride Chloride   Date Value Ref Range Status   06/05/2021 93 (L) 98 - 107 mmol/L Final      CO2 CO2   Date Value Ref Range Status   06/05/2021 22.0 22.0 - 29.0 mmol/L Final      BUN BUN   Date Value Ref Range Status   06/05/2021 67 (H) 6 - 20 mg/dL Final      Creatinine Creatinine   Date Value Ref Range Status   06/05/2021 9.17 (H) 0.76 - 1.27 mg/dL Final      Calcium Calcium   Date Value Ref Range Status   06/05/2021 8.5 (L) 8.6 - 10.5 mg/dL Final      PO4 No results found for: CAPO4   Albumin No results found for: ALBUMIN   Magnesium No results found for: MG   Uric Acid No results found for: URICACID        Results Review:     I reviewed the patient's new clinical results.    calcitriol, 0.25 mcg, Oral, Daily  cloNIDine, 0.3 mg, Oral, TID  famotidine, 20 mg, Oral, Daily  folic acid, 1 mg, Oral, Daily  [START ON 6/8/2021] losartan, 50 mg, Oral, Daily  predniSONE, 20 mg, Oral, Daily With Breakfast  sevelamer, 800 mg, Oral, TID With Meals  sodium bicarbonate, 650 mg, Oral, TID  sodium chloride, 10 mL, Intravenous, Q12H           Medication Review: Reviewed    Assessment/Plan       ESRD (end stage renal disease) on dialysis (CMS/[facility])    Chronic kidney disease on chronic dialysis (CMS/[facility])    Essential hypertension    Anemia, chronic disease    Essential hypertension    Thrombocytopenia (CMS/[facility])      Plan: Continue HD TTS.  Working on an outpatient HD spot.  Clonidine titrated back up.  No heparin with dialysis.  Hematology following for TCP work-up and management.  Social work following.      Hitesh Streeter MD   [facility]  06/07/21  14:12 EDT

## 2021-06-07 NOTE — CASE MANAGEMENT/SOCIAL WORK
Continued Stay Note  TREVON Nick     Patient Name: Josh Klein  MRN: [id number]  Today's Date: 6/7/2021    Admit Date: 5/29/2021    Discharge Plan     Row Name 06/07/21 1608       Plan    Plan  Anticipate routine home with brother. Waiting on new OP HD chair time at [facility], see note for details.    Plan Comments  Received an email from Enoch Schaefer that patient is over income for Medicaid insurance. Called and left voicemail for Fabiola at [facility] to inform of this new information. Received a return phone call from Fabiola inquiring to determine patients total household income, and who lives in the home with patient, and if he has any dependents. Met with patient at bedside, he informed he lives at home with brother, sister in law, and 2 nephews. Patient informed he does not have any depedent children in the home. He informed his brother and sister in law work but is unsure what their income is. Called and left Fabiola at [facility] a voicemail with this information around 1pm. Called and left another voicemail at 4pm requesting an update. Waiting on return phone call. Avoidable day entered.        Met with patient in room wearing PPE: mask, goggles.    Maintained distance greater than six feet and spent less than 15 minutes in the room.          Hollie Stewart RN

## 2021-06-07 NOTE — PLAN OF CARE
Goal Outcome Evaluation:  Plan of Care Reviewed With: patient  Progress: declining  Outcome Summary: pt BP keeps spiking, just gave prn med, earlier gave night time med early, pt rested in bed with no complaints. d/c pending out pt support for dialysis

## 2021-06-07 NOTE — PROGRESS NOTES
Hematology/Oncology Inpatient Progress Note    PATIENT NAME: Josh Klein  : 1970  MRN: [id number]    CHIEF COMPLAINT: Acute thrombocytopenia    HISTORY OF PRESENT ILLNESS:    50 y.o. male presented to [facility] ER on 2021 reporting a need for hemodialysis.  He is an undocumented immigrant and has not been able to obtain insurance to receive hemodialysis as an outpatient.  He reported weakness dyspnea on exertion and a low blood pressure for the past 24 hours.  His chest x-ray was unremarkable. WBC 7.2, hemoglobin 10.3, MCV 94.2 and platelets 269,000.  His platelets started declining the following day, and were 33,000 on 2021.  PT 10.7 (9.6-11.7, PTT 25.2 (24-31), vitamin B12 424 (211-946), and LFTs were normal. According to the medical record, he was diagnosed with end-stage renal disease at [facility] on 2021 and was started on hemodialysis.  Since that time he has been unable to obtain insurance coverage for treatment of his renal disease.     21  Hematology/Oncology was consulted by Dr. Nayak for acute thrombocytopenia.  He denied any history of bleeding or low blood counts.     Past Medical History: End-stage renal disease and hypertension.  Surgical History:  Vascular surgery.  Social History: He lives in [place], [place] with his brother's family.  He has worked in construction.  He does not smoke or use alcohol or other recreational drugs.  Family History: His mother had hypertension.  Allergies: No known allergies.     PCP: Provider, No Known     INTERVAL HISTORY:  • 2021 -  Prednisone 60 mg by mouth daily was started on 2021.White blood count 7.8, hemoglobin 10.6, MCV 93.7, platelets 56,000. Folate 4.34 (4.78-24.20), iron 68 (), iron saturation 21 (20-50) transferrin 215 (200-360), TIBC 320 (298-536), reticulocytes 0.55 (0.7-1.9), haptoglobin 122 (). Started folic acid 1 mg p.o. once daily.  Received Venofer 200 mg IV and Retacrit  30,000 units subcu x1 dose per nephrology on 5/31/2021 with hemodialysis.  Received heparin with dialysis.   • 6/2/2021- On 6/1/2021 1848 platelets 22,000, CMV IgM <30.0 (<30.0), erythropoietin 9.4 (2.6-18.5).   · 6/3/2021 -  EBV IgM <36.0 (<36.0), copper 101 (), and SPEP with no M spike or monoclonal protein is not present.   · 6/4/2021-platelet count 68,000.  HIT 0.133 (0-0.4) however low platelet count felt secondary to heparin.  Prednisone decreased to 40 mg by mouth daily.  Holding heparin with dialysis.  · 6/5/2021-platelets 128,000.  Prednisone discontinued.   · 6/6/2021-platelets dropped to 80,000.  Prednisone 40 mg p.o. daily started.  · 6/7/2021-platelets 119,000, prednisone dose decreased to 20 mg daily.    History of present illness reviewed since last visit and changes noted on 06/07/21.    Subjective   Claims blood pressure has been fluctuating.    ROS:  Review of Systems   Constitutional: Negative for activity change, chills, fatigue, fever and unexpected weight change.   HENT: Negative for congestion, dental problem, hearing loss, mouth sores, nosebleeds, sore throat and trouble swallowing.    Eyes: Negative for photophobia and visual disturbance.   Respiratory: Negative for cough, choking, chest tightness and shortness of breath.    Cardiovascular: Negative for chest pain, palpitations and leg swelling.   Gastrointestinal: Negative for abdominal distention, abdominal pain, blood in stool, constipation, diarrhea, nausea and vomiting.   Endocrine: Negative for cold intolerance and heat intolerance.   Genitourinary: Negative for decreased urine volume, difficulty urinating, dysuria, frequency, hematuria and urgency.   Musculoskeletal: Negative for arthralgias and gait problem.   Skin: Negative for rash and wound.   Neurological: Negative for dizziness, tremors, seizures, weakness, light-headedness, numbness and headaches.   Hematological: Negative for adenopathy. Does not bruise/bleed easily.  "  Psychiatric/Behavioral: Negative for confusion and hallucinations. The patient is not nervous/anxious.    All other systems reviewed and are negative.     MEDICATIONS:    Scheduled Meds:  calcitriol, 0.25 mcg, Oral, Daily  cloNIDine, 0.1 mg, Oral, TID  famotidine, 20 mg, Oral, Daily  folic acid, 1 mg, Oral, Daily  losartan, 25 mg, Oral, Daily  predniSONE, 40 mg, Oral, Daily With Breakfast  sevelamer, 800 mg, Oral, TID With Meals  sodium bicarbonate, 650 mg, Oral, TID  sodium chloride, 10 mL, Intravenous, Q12H       Continuous Infusions:      PRN Meds:  •  acetaminophen **OR** acetaminophen **OR** acetaminophen  •  acetaminophen  •  labetalol  •  melatonin  •  nitroglycerin  •  ondansetron **OR** ondansetron  •  pharmacy  •  sodium chloride     ALLERGIES:    Allergies   Allergen Reactions   • Heparin Other (See Comments)     Patient is thrombocytopenic.  Avoiding heparin at this time.       Objective    VITALS:   /88 (BP Location: Left arm, Patient Position: Lying)   Pulse 82   Temp 98.1 °F (36.7 °C) (Oral)   Resp 18   Ht 162.6 cm (64\")   Wt 61.2 kg (134 lb 14.7 oz)   SpO2 97%   BMI 23.16 kg/m²     PHYSICAL EXAM:  Physical Exam  Vitals and nursing note reviewed.   Constitutional:       General: He is not in acute distress.     Appearance: Normal appearance. He is well-developed. He is not diaphoretic.   HENT:      Head: Normocephalic and atraumatic.      Right Ear: External ear normal.      Left Ear: External ear normal.      Nose: Nose normal.      Mouth/Throat:      Mouth: Mucous membranes are moist.      Pharynx: Oropharynx is clear. No oropharyngeal exudate or posterior oropharyngeal erythema.      Comments: Dental fillings  Eyes:      General: No scleral icterus.     Extraocular Movements: Extraocular movements intact.      Conjunctiva/sclera: Conjunctivae normal.      Pupils: Pupils are equal, round, and reactive to light.      Comments: Eyeglasses   Cardiovascular:      Rate and Rhythm: Normal " rate and regular rhythm.      Heart sounds: Normal heart sounds. No murmur heard.        Comments: Right chest wall Shiley.  Monitor leads.  Pulmonary:      Effort: Pulmonary effort is normal. No respiratory distress.      Breath sounds: Normal breath sounds. No stridor. No wheezing or rales.   Abdominal:      General: Bowel sounds are normal. There is no distension.      Palpations: Abdomen is soft. There is no mass.      Tenderness: There is no abdominal tenderness. There is no guarding.   Genitourinary:     Comments: Deferred   Musculoskeletal:         General: No swelling, tenderness or deformity. Normal range of motion.      Cervical back: Normal range of motion and neck supple.      Right lower leg: No edema.      Left lower leg: No edema.      Comments: LUE BP cuff and IV   Lymphadenopathy:      Cervical: No cervical adenopathy.      Upper Body:      Right upper body: No supraclavicular adenopathy.      Left upper body: No supraclavicular adenopathy.   Skin:     General: Skin is warm and dry.      Coloration: Skin is not jaundiced or pale.      Findings: No bruising, erythema or rash.   Neurological:      General: No focal deficit present.      Mental Status: He is alert and oriented to person, place, and time.      Coordination: Coordination normal.   Psychiatric:         Mood and Affect: Mood normal.         Behavior: Behavior normal.         Thought Content: Thought content normal.         RECENT LABS:  Lab Results (last 24 hours)     Procedure Component Value Units Date/Time    CBC & Differential [[id number]]  (Abnormal) Collected: 06/07/21 0355    Specimen: Blood Updated: 06/07/21 0425    Narrative:      The following orders were created for panel order CBC & Differential.  Procedure                               Abnormality         Status                     ---------                               -----------         ------                     CBC Auto Differential[[id number]]        Abnormal             Final result                 Please view results for these tests on the individual orders.    CBC Auto Differential [[id number]]  (Abnormal) Collected: 06/07/21 0355    Specimen: Blood Updated: 06/07/21 0425     WBC 15.60 10*3/mm3      RBC 3.34 10*6/mm3      Hemoglobin 10.4 g/dL      Hematocrit 31.6 %      MCV 94.6 fL      MCH 31.2 pg      MCHC 33.0 g/dL      RDW 14.9 %      RDW-SD 48.6 fl      MPV 9.8 fL      Platelets 119 10*3/mm3      Neutrophil % 80.8 %      Lymphocyte % 10.2 %      Monocyte % 8.5 %      Eosinophil % 0.2 %      Basophil % 0.3 %      Neutrophils, Absolute 12.60 10*3/mm3      Lymphocytes, Absolute 1.60 10*3/mm3      Monocytes, Absolute 1.30 10*3/mm3      Eosinophils, Absolute 0.00 10*3/mm3      Basophils, Absolute 0.10 10*3/mm3      nRBC 0.3 /100 WBC         PENDING RESULTS: n/a    IMAGING REVIEWED:  No radiology results for the last day    I have reviewed the patient's labs, imaging, reports, and other clinician documentation.    Assessment/Plan   ASSESSMENT  1. Thrombocytopenia: Acute and had been significantly low.  No overt bleeding.  LFTs and coags normal.  Only possible culprit drug is heparin with dialysis and he has folate deficiency. CMV and EBV IgMs were negative. Started treatment with oral steroids on 5/31/2021 with improvement in platelet count noted initially but platelets declined after dialysis and use of heparin.  Received heparin  with dialysis on 6/1/2021. 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  Prednisone discontinued 6/4/2021 with increase in platelets 128,000, but restarted next day for platelet count drop to 80,000.  Prednisone being tapered now.  2. Anemia/Folate deficiency anemia: Anemia probably chronic due to ESRD.  Anemia work-up revealed folate deficiency. Received Venofer and epo on 5/31/2021 with dialysis per nephrology.  Started on folate supplementation.  3. ESRD/Chronic kidney disease on chronic  dialysis/Essential hypertension: On hemodialysis per nephrology. No heparin with HD due to thrombocytopenia. Needs emergency medicaid for outpatient HD.   4. Uninsured status:  consulted.     PLAN  1. Continue to hold all heparin including heparin with hemodialysis.  2. Ensure outpatient dialysis center is aware to hold heparin.  3. Taper prednisone dose.  4. Follow CBC.   5. Continue folic acid 1 mg p.o. once daily.  6. Continue EPO and Venofer per nephrology with hemodialysis.  7. Outpatient platelet antibodies.                I discussed the patients findings and my recommendations with patient.    Electronically signed by Devan Bray MD, 06/07/21, 7:50 AM EDT.

## 2021-06-07 NOTE — PROGRESS NOTES
[facility] Medicine Services Daily Progress Note      Hospitalist Team  LOS 6 days      Patient Care Team:  Provider, No Known as PCP - Devan Drew MD as Consulting Physician (Hematology and Oncology)    Patient Location: 311/1      Subjective   Subjective     Chief Complaint / Subjective  Chief Complaint   Patient presents with   • Hypotension     pt seen here on Sunday to receive dialysis-pt without insurance and recently started receiving dialysis with the hospital, pt has not received dialysis since last Sunday. pt denies any pain, swelling or SOA         Brief Synopsis of Hospital Course/HPI    Patient is a 50-year-old male with past medical history of hypertension, end-stage renal disease on hemodialysis, anemia of chronic kidney disease who presented to the emergency room because of hypotension.  Patient was recently in the hospital and completed his hemodialysis.  Patient is unable to get dialysis because of his lack of insurance.  Patient needs emergent dialysis and was recommended for admission for further treatment and management.  Nephrology consult was completed.  Hematology consult was completed because of thrombocytopenia.    Patient reported moderate improvement in his symptoms.      Date:: 6/7/2021.        Review of Systems   Constitutional: Negative.   HENT: Negative.    Eyes: Negative.    Cardiovascular: Negative.    Respiratory: Negative.    Endocrine: Negative.    Hematologic/Lymphatic: Negative.    Skin: Negative.    Musculoskeletal: Negative.    Gastrointestinal: Negative.    Genitourinary: Negative.    Neurological: Negative.    Psychiatric/Behavioral: Negative.    Allergic/Immunologic: Negative.          Objective   Objective      Vital Signs  Temp:  [97.7 °F (36.5 °C)-98.9 °F (37.2 °C)] 98.9 °F (37.2 °C)  Heart Rate:  [] 89  Resp:  [16-18] 18  BP: (156-198)/() 198/103  Oxygen Therapy  SpO2: 96 %  Pulse Oximetry Type: Intermittent  Device  "(Oxygen Therapy): room air  Flowsheet Rows      First Filed Value   Admission Height  162.6 cm (64\") Documented at 05/29/2021 1252   Admission Weight  61.9 kg (136 lb 7.4 oz) Documented at 05/29/2021 1252        Intake & Output (last 3 days)       06/04 0701 - 06/05 0700 06/05 0701 - 06/06 0700 06/06 0701 - 06/07 0700 06/07 0701 - 06/08 0700    P.O. 720  240     Total Intake(mL/kg) 720 (11.8)  240 (3.9)     Urine (mL/kg/hr)        Other  1000      Total Output  1000      Net +720 -1000 +240             Urine Unmeasured Occurrence 1 x 1 x 2 x         Lines, Drains & Airways    Active LDAs     Name:   Placement date:   Placement time:   Site:   Days:    CVC Double Lumen 05/06/21 Tunneled Right Subclavian   05/06/21    --    Subclavian   25    Peripheral IV 05/29/21 1340 Left Antecubital   05/29/21    1340    Antecubital   1                  Physical Exam:    Physical Exam  Vitals and nursing note reviewed.   Constitutional:       General: He is not in acute distress.     Comments: Patient is sitting on the bed and in no acute distress.   HENT:      Head: Normocephalic.      Nose: Nose normal.      Mouth/Throat:      Mouth: Mucous membranes are dry.      Pharynx: Oropharynx is clear.   Eyes:      Extraocular Movements: Extraocular movements intact.      Conjunctiva/sclera: Conjunctivae normal.      Pupils: Pupils are equal, round, and reactive to light.   Cardiovascular:      Pulses: Normal pulses.      Heart sounds: No murmur heard.   No friction rub. No gallop.       Comments: S1 and S2 present.  No tachycardia.  Pulmonary:      Effort: Pulmonary effort is normal.      Breath sounds: No stridor. No wheezing or rales.      Comments: Good air entry bilaterally.  No wheezing.  Chest:      Chest wall: No tenderness.   Abdominal:      General: Bowel sounds are normal. There is no distension.      Palpations: Abdomen is soft.      Tenderness: There is no abdominal tenderness. There is no right CVA tenderness or guarding. "   Musculoskeletal:         General: No swelling, tenderness, deformity or signs of injury.      Cervical back: Normal range of motion. No rigidity.      Right lower leg: No edema.      Left lower leg: No edema.   Skin:     General: Skin is warm and dry.      Capillary Refill: Capillary refill takes less than 2 seconds.      Coloration: Skin is not jaundiced.      Findings: No bruising, erythema, lesion or rash.   Neurological:      General: No focal deficit present.   Psychiatric:      Comments: No agitation.               Procedures:              Results Review:     I reviewed the patient's new clinical results.      Lab Results (last 24 hours)     Procedure Component Value Units Date/Time    CBC & Differential [[id number]]  (Abnormal) Collected: 06/07/21 0355    Specimen: Blood Updated: 06/07/21 0425    Narrative:      The following orders were created for panel order CBC & Differential.  Procedure                               Abnormality         Status                     ---------                               -----------         ------                     CBC Auto Differential[[id number]]        Abnormal            Final result                 Please view results for these tests on the individual orders.    CBC Auto Differential [[id number]]  (Abnormal) Collected: 06/07/21 0355    Specimen: Blood Updated: 06/07/21 0425     WBC 15.60 10*3/mm3      RBC 3.34 10*6/mm3      Hemoglobin 10.4 g/dL      Hematocrit 31.6 %      MCV 94.6 fL      MCH 31.2 pg      MCHC 33.0 g/dL      RDW 14.9 %      RDW-SD 48.6 fl      MPV 9.8 fL      Platelets 119 10*3/mm3      Neutrophil % 80.8 %      Lymphocyte % 10.2 %      Monocyte % 8.5 %      Eosinophil % 0.2 %      Basophil % 0.3 %      Neutrophils, Absolute 12.60 10*3/mm3      Lymphocytes, Absolute 1.60 10*3/mm3      Monocytes, Absolute 1.30 10*3/mm3      Eosinophils, Absolute 0.00 10*3/mm3      Basophils, Absolute 0.10 10*3/mm3      nRBC 0.3 /100 WBC         No results found for:  HGBA1C            No results found for: LIPASE  No results found for: CHOL, CHLPL, TRIG, HDL, LDL, LDLDIRECT    No results found for: INTRAOP, PREDX, FINALDX, COMDX    Microbiology Results (last 10 days)     Procedure Component Value - Date/Time    COVID PRE-OP / PRE-PROCEDURE SCREENING ORDER (NO ISOLATION) - Swab, Nasopharynx [[id number]]  (Normal) Collected: 05/29/21 1634    Lab Status: Final result Specimen: Swab from Nasopharynx Updated: 05/29/21 1703    Narrative:      The following orders were created for panel order COVID PRE-OP / PRE-PROCEDURE SCREENING ORDER (NO ISOLATION) - Swab, Nasopharynx.  Procedure                               Abnormality         Status                     ---------                               -----------         ------                     COVID-19,CEPHEID,COR/DARRYN...[[id number]]  Normal              Final result                 Please view results for these tests on the individual orders.    COVID-19,CEPHEID,COR/DARRYN/PAD IN-HOUSE(OR EMERGENT/ADD-ON),NP SWAB IN TRANSPORT MEDIA 3-4 HR TAT, RT-PCR - Swab, Nasopharynx [[id number]]  (Normal) Collected: 05/29/21 1634    Lab Status: Final result Specimen: Swab from Nasopharynx Updated: 05/29/21 1703     COVID19 Not Detected    Narrative:      Fact sheet for providers: https://www.fda.gov/media/728912/download     Fact sheet for patients: https://www.fda.gov/media/498257/download  Fact sheet for providers: https://www.fda.gov/media/982059/download    Fact sheet for patients: https://www.fda.gov/media/555826/download    Test performed by PCR.          ECG/EMG Results (most recent)     Procedure Component Value Units Date/Time    ECG 12 Lead [[id number]] Collected: 05/29/21 1333     Updated: 05/30/21 0821     QT Interval 413 ms     Narrative:      HEART RATE= 70  bpm  RR Interval= 864  ms  PA Interval= 199  ms  P Horizontal Axis= -29  deg  P Front Axis= 47  deg  QRSD Interval= 110  ms  QT Interval= 413  ms  QRS Axis= -48  deg  T Wave Axis= 45   deg  - BORDERLINE ECG -  Sinus rhythm  Probable left ventricular hypertrophy  When compared with ECG of 23-May-2021 12:49:50,  Significant repolarization change  Electronically Signed By: Román Mcnair (Joseph) 30-May-2021 08:20:51  Date and Time of Study: 2021-05-29 13:33:44          Results for orders placed during the hospital encounter of 05/29/21    Duplex Hemodialysis Access CAR    Interpretation Summary  · Patent right radiocephalic AV fistula with an anastomotic stenosis and adequate volume flow. 4 mm cephalic vein tributary proximally.          No radiology results for the last 7 days        Xrays, labs reviewed personally by physician.    Medication Review:   I have reviewed the patient's current medication list      Scheduled Meds  calcitriol, 0.25 mcg, Oral, Daily  cloNIDine, 0.3 mg, Oral, TID  famotidine, 20 mg, Oral, Daily  folic acid, 1 mg, Oral, Daily  [START ON 6/8/2021] losartan, 50 mg, Oral, Daily  predniSONE, 20 mg, Oral, Daily With Breakfast  sevelamer, 800 mg, Oral, TID With Meals  sodium bicarbonate, 650 mg, Oral, TID  sodium chloride, 10 mL, Intravenous, Q12H        Meds Infusions       Meds PRN  •  acetaminophen **OR** acetaminophen **OR** acetaminophen  •  acetaminophen  •  hydrALAZINE  •  labetalol  •  melatonin  •  nitroglycerin  •  ondansetron **OR** ondansetron  •  pharmacy  •  sodium chloride        Assessment/Plan   Assessment/Plan     Active Hospital Problems    Diagnosis  POA   • **ESRD (end stage renal disease) on dialysis (CMS/[facility]) [N18.6, Z99.2]  Not Applicable     Priority: High  Follow nephrology recommendations.  Continue hemodialysis.     • Anemia, chronic disease [D63.8]  Yes     Priority: Medium  Monitor H&H.    Thrombocytopenia  Follow hematology oncology recommendations.     • Essential hypertension [I10]  Yes     Priority: Medium  Treat with Catapres.     • Essential hypertension [I10]  Treat with losartan.  Follow nephrology recommendations.      Yes   • Chronic kidney  disease on chronic dialysis (CMS/[facility]) [N18.6, Z99.2]  Follow nephrology recommendations.      Thrombocytopenia.  Follow heme-onc recommendations.    DVT prophylaxis   -Heparin and SCDs.      Not Applicable      Resolved Hospital Problems    Diagnosis Date Resolved POA      Yes       MEDICAL DECISION MAKING COMPLEXITY BY PROBLEM:     Continue appropriate patient's home medications for other chronic medical conditions.  Continue the present level of care.  Patient and family agreed with the plan of care.          VTE Prophylaxis -   Mechanical Order History:      Ordered        05/30/21 1432  Place Sequential Compression Device  Once         05/30/21 1432  Maintain Sequential Compression Device  Continuous                 Pharmalogical Order History:      Ordered     Dose Route Frequency Stop    05/29/21 1710  heparin (porcine) 5000 UNIT/ML injection 5,000 Units  Status:  Discontinued      5,000 Units SC Every 12 Hours Scheduled 05/30/21 1432                  Code Status -   Code Status and Medical Interventions:   Ordered at: 05/29/21 1628     Code Status:    CPR     Medical Interventions (Level of Support Prior to Arrest):    Full       This patient has been examined wearing appropriate Personal Protective Equipment and discussed with hospital infection control department, [facility] department, infectious disease specialist and pulmonologist. 06/07/21        Discharge Planning  It may discharge in the next 1 to 2 days.        Electronically signed by Benjy Nayak MD, 06/07/21, 13:16 EDT.  [facility] Hospitalist Team

## 2021-06-08 LAB
ANION GAP SERPL CALCULATED.3IONS-SCNC: 17 MMOL/L (ref 5–15)
BASOPHILS # BLD AUTO: 0 10*3/MM3 (ref 0–0.2)
BASOPHILS NFR BLD AUTO: 0.3 % (ref 0–1.5)
BUN SERPL-MCNC: 75 MG/DL (ref 6–20)
BUN/CREAT SERPL: 8.4 (ref 7–25)
CALCIUM SPEC-SCNC: 8.1 MG/DL (ref 8.6–10.5)
CHLORIDE SERPL-SCNC: 97 MMOL/L (ref 98–107)
CO2 SERPL-SCNC: 22 MMOL/L (ref 22–29)
CREAT SERPL-MCNC: 8.97 MG/DL (ref 0.76–1.27)
DEPRECATED RDW RBC AUTO: 47.7 FL (ref 37–54)
EOSINOPHIL # BLD AUTO: 0.3 10*3/MM3 (ref 0–0.4)
EOSINOPHIL NFR BLD AUTO: 1.9 % (ref 0.3–6.2)
ERYTHROCYTE [DISTWIDTH] IN BLOOD BY AUTOMATED COUNT: 14.9 % (ref 12.3–15.4)
GFR SERPL CREATININE-BSD FRML MDRD: 6 ML/MIN/1.73
GFR SERPL CREATININE-BSD FRML MDRD: 8 ML/MIN/1.73
GLUCOSE SERPL-MCNC: 186 MG/DL (ref 65–99)
HCT VFR BLD AUTO: 29.4 % (ref 37.5–51)
HGB BLD-MCNC: 9.6 G/DL (ref 13–17.7)
LYMPHOCYTES # BLD AUTO: 2.5 10*3/MM3 (ref 0.7–3.1)
LYMPHOCYTES NFR BLD AUTO: 17.7 % (ref 19.6–45.3)
MCH RBC QN AUTO: 30.8 PG (ref 26.6–33)
MCHC RBC AUTO-ENTMCNC: 32.6 G/DL (ref 31.5–35.7)
MCV RBC AUTO: 94.6 FL (ref 79–97)
MONOCYTES # BLD AUTO: 1.3 10*3/MM3 (ref 0.1–0.9)
MONOCYTES NFR BLD AUTO: 9.4 % (ref 5–12)
NEUTROPHILS NFR BLD AUTO: 10.1 10*3/MM3 (ref 1.7–7)
NEUTROPHILS NFR BLD AUTO: 70.7 % (ref 42.7–76)
NRBC BLD AUTO-RTO: 0.1 /100 WBC (ref 0–0.2)
PLATELET # BLD AUTO: 147 10*3/MM3 (ref 140–450)
PMV BLD AUTO: 8.6 FL (ref 6–12)
POTASSIUM SERPL-SCNC: 4.2 MMOL/L (ref 3.5–5.2)
RBC # BLD AUTO: 3.11 10*6/MM3 (ref 4.14–5.8)
SODIUM SERPL-SCNC: 136 MMOL/L (ref 136–145)
WBC # BLD AUTO: 14.2 10*3/MM3 (ref 3.4–10.8)

## 2021-06-08 PROCEDURE — 80048 BASIC METABOLIC PNL TOTAL CA: CPT | Performed by: INTERNAL MEDICINE

## 2021-06-08 PROCEDURE — 63710000001 PREDNISONE PER 1 MG: Performed by: INTERNAL MEDICINE

## 2021-06-08 PROCEDURE — 25010000002 ALTEPLASE 2 MG RECONSTITUTED SOLUTION 1 EACH VIAL: Performed by: INTERNAL MEDICINE

## 2021-06-08 PROCEDURE — 99233 SBSQ HOSP IP/OBS HIGH 50: CPT | Performed by: INTERNAL MEDICINE

## 2021-06-08 PROCEDURE — 5A1D70Z PERFORMANCE OF URINARY FILTRATION, INTERMITTENT, LESS THAN 6 HOURS PER DAY: ICD-10-PCS | Performed by: INTERNAL MEDICINE

## 2021-06-08 PROCEDURE — 85025 COMPLETE CBC W/AUTO DIFF WBC: CPT | Performed by: NURSE PRACTITIONER

## 2021-06-08 RX ORDER — ALBUMIN (HUMAN) 12.5 G/50ML
12.5 SOLUTION INTRAVENOUS AS NEEDED
Status: ACTIVE | OUTPATIENT
Start: 2021-06-08 | End: 2021-06-08

## 2021-06-08 RX ADMIN — CLONIDINE HYDROCHLORIDE 0.3 MG: 0.1 TABLET ORAL at 09:41

## 2021-06-08 RX ADMIN — SODIUM BICARBONATE 650 MG: 650 TABLET ORAL at 23:23

## 2021-06-08 RX ADMIN — Medication 10 ML: at 09:41

## 2021-06-08 RX ADMIN — CALCITRIOL CAPSULES 0.25 MCG 0.25 MCG: 0.25 CAPSULE ORAL at 09:40

## 2021-06-08 RX ADMIN — WATER 4 MG: 1 INJECTION INTRAMUSCULAR; INTRAVENOUS; SUBCUTANEOUS at 23:05

## 2021-06-08 RX ADMIN — LOSARTAN POTASSIUM 50 MG: 50 TABLET, FILM COATED ORAL at 09:41

## 2021-06-08 RX ADMIN — SODIUM BICARBONATE 650 MG: 650 TABLET ORAL at 09:41

## 2021-06-08 RX ADMIN — FOLIC ACID 1 MG: 1 TABLET ORAL at 09:41

## 2021-06-08 RX ADMIN — Medication 10 ML: at 23:23

## 2021-06-08 RX ADMIN — CLONIDINE HYDROCHLORIDE 0.3 MG: 0.1 TABLET ORAL at 23:23

## 2021-06-08 RX ADMIN — SEVELAMER CARBONATE 800 MG: 800 TABLET, FILM COATED ORAL at 09:41

## 2021-06-08 RX ADMIN — FAMOTIDINE 20 MG: 20 TABLET ORAL at 09:41

## 2021-06-08 RX ADMIN — PREDNISONE 20 MG: 20 TABLET ORAL at 09:41

## 2021-06-08 NOTE — PROGRESS NOTES
[facility] Medicine Services Daily Progress Note      Hospitalist Team  LOS 7 days      Patient Care Team:  Provider, No Known as PCP - Devan Drew MD as Consulting Physician (Hematology and Oncology)    Patient Location: 311/1      Subjective   Subjective     Chief Complaint / Subjective  Chief Complaint   Patient presents with   • Hypotension     pt seen here on Sunday to receive dialysis-pt without insurance and recently started receiving dialysis with the hospital, pt has not received dialysis since last Sunday. pt denies any pain, swelling or SOA         Brief Synopsis of Hospital Course/HPI    Patient is a 50-year-old male with past medical history of anemia of chronic kidney disease, hypertension, end-stage renal disease on hemodialysis who presented to the emergency room because of hypotension.  Patient was recently in the hospital and completed his hemodialysis.  Patient is unable to get dialysis because of his lack of insurance.  Patient needs emergent dialysis and was recommended for admission for further treatment and management.  Nephrology consult was completed.  Hematology consult was completed because of thrombocytopenia.    No overnight events noted.      Date:: 6/8/2021.        Review of Systems   Constitutional: Negative.   HENT: Negative.    Eyes: Negative.    Cardiovascular: Negative.    Respiratory: Negative.    Endocrine: Negative.    Hematologic/Lymphatic: Negative.    Skin: Negative.    Musculoskeletal: Negative.    Gastrointestinal: Negative.    Genitourinary: Negative.    Neurological: Negative.    Psychiatric/Behavioral: Negative.    Allergic/Immunologic: Negative.          Objective   Objective      Vital Signs  Temp:  [98 °F (36.7 °C)-98.4 °F (36.9 °C)] 98.2 °F (36.8 °C)  Heart Rate:  [] 67  Resp:  [14-18] 18  BP: (122-179)/(67-99) 122/67  Oxygen Therapy  SpO2: 97 %  Pulse Oximetry Type: Intermittent  Device (Oxygen Therapy): room  "air  Flowsheet Rows      First Filed Value   Admission Height  162.6 cm (64\") Documented at 05/29/2021 1252   Admission Weight  61.9 kg (136 lb 7.4 oz) Documented at 05/29/2021 1252        Intake & Output (last 3 days)       06/05 0701 - 06/06 0700 06/06 0701 - 06/07 0700 06/07 0701 - 06/08 0700 06/08 0701 - 06/09 0700    P.O.  240 480 360    Total Intake(mL/kg)  240 (3.9) 480 (7.9) 360 (5.9)    Urine (mL/kg/hr)    400 (1)    Other 1000       Total Output 1000   400    Net -1000 +240 +480 -40            Urine Unmeasured Occurrence 1 x 2 x          Lines, Drains & Airways    Active LDAs     Name:   Placement date:   Placement time:   Site:   Days:    CVC Double Lumen 05/06/21 Tunneled Right Subclavian   05/06/21    --    Subclavian   25    Peripheral IV 05/29/21 1340 Left Antecubital   05/29/21    1340    Antecubital   1                  Physical Exam:    Physical Exam  Vitals and nursing note reviewed.   Constitutional:       General: He is not in acute distress.  HENT:      Head: Normocephalic.      Nose: Nose normal.      Mouth/Throat:      Mouth: Mucous membranes are dry.      Pharynx: Oropharynx is clear.   Eyes:      Extraocular Movements: Extraocular movements intact.      Conjunctiva/sclera: Conjunctivae normal.      Pupils: Pupils are equal, round, and reactive to light.   Cardiovascular:      Pulses: Normal pulses.      Heart sounds: No murmur heard.   No friction rub. No gallop.       Comments: S1 and S2 present.  No tachycardia.  Pulmonary:      Effort: Pulmonary effort is normal.      Breath sounds: No stridor. No wheezing or rales.      Comments: Good air entry bilaterally.  No wheezing.  Chest:      Chest wall: No tenderness.   Abdominal:      General: Bowel sounds are normal. There is no distension.      Palpations: Abdomen is soft.      Tenderness: There is no abdominal tenderness. There is no right CVA tenderness or guarding.   Musculoskeletal:         General: No swelling, tenderness, deformity or " signs of injury.      Cervical back: Normal range of motion. No rigidity.      Right lower leg: No edema.      Left lower leg: No edema.   Skin:     General: Skin is warm and dry.      Capillary Refill: Capillary refill takes less than 2 seconds.      Coloration: Skin is not jaundiced.      Findings: No bruising, erythema, lesion or rash.   Neurological:      General: No focal deficit present.   Psychiatric:      Comments: No agitation.               Procedures:              Results Review:     I reviewed the patient's new clinical results.      Lab Results (last 24 hours)     Procedure Component Value Units Date/Time    CBC & Differential [[id number]]  (Abnormal) Collected: 06/08/21 0530    Specimen: Blood Updated: 06/08/21 0549    Narrative:      The following orders were created for panel order CBC & Differential.  Procedure                               Abnormality         Status                     ---------                               -----------         ------                     CBC Auto Differential[[id number]]        Abnormal            Final result                 Please view results for these tests on the individual orders.    CBC Auto Differential [[id number]]  (Abnormal) Collected: 06/08/21 0530    Specimen: Blood Updated: 06/08/21 0549     WBC 14.20 10*3/mm3      RBC 3.11 10*6/mm3      Hemoglobin 9.6 g/dL      Hematocrit 29.4 %      MCV 94.6 fL      MCH 30.8 pg      MCHC 32.6 g/dL      RDW 14.9 %      RDW-SD 47.7 fl      MPV 8.6 fL      Platelets 147 10*3/mm3      Neutrophil % 70.7 %      Lymphocyte % 17.7 %      Monocyte % 9.4 %      Eosinophil % 1.9 %      Basophil % 0.3 %      Neutrophils, Absolute 10.10 10*3/mm3      Lymphocytes, Absolute 2.50 10*3/mm3      Monocytes, Absolute 1.30 10*3/mm3      Eosinophils, Absolute 0.30 10*3/mm3      Basophils, Absolute 0.00 10*3/mm3      nRBC 0.1 /100 WBC         No results found for: HGBA1C            No results found for: LIPASE  No results found for: CHOL,  CHLPL, TRIG, HDL, LDL, LDLDIRECT    No results found for: INTRAOP, PREDX, FINALDX, COMDX    Microbiology Results (last 10 days)     Procedure Component Value - Date/Time    COVID PRE-OP / PRE-PROCEDURE SCREENING ORDER (NO ISOLATION) - Swab, Nasopharynx [[id number]]  (Normal) Collected: 05/29/21 1634    Lab Status: Final result Specimen: Swab from Nasopharynx Updated: 05/29/21 1703    Narrative:      The following orders were created for panel order COVID PRE-OP / PRE-PROCEDURE SCREENING ORDER (NO ISOLATION) - Swab, Nasopharynx.  Procedure                               Abnormality         Status                     ---------                               -----------         ------                     COVID-19,CEPHEID,COR/DARRYN...[[id number]]  Normal              Final result                 Please view results for these tests on the individual orders.    COVID-19,CEPHEID,COR/DARRYN/PAD IN-HOUSE(OR EMERGENT/ADD-ON),NP SWAB IN TRANSPORT MEDIA 3-4 HR TAT, RT-PCR - Swab, Nasopharynx [[id number]]  (Normal) Collected: 05/29/21 1634    Lab Status: Final result Specimen: Swab from Nasopharynx Updated: 05/29/21 1703     COVID19 Not Detected    Narrative:      Fact sheet for providers: https://www.fda.gov/media/631949/download     Fact sheet for patients: https://www.fda.gov/media/703231/download  Fact sheet for providers: https://www.fda.gov/media/296990/download    Fact sheet for patients: https://www.fda.gov/media/959972/download    Test performed by PCR.          ECG/EMG Results (most recent)     Procedure Component Value Units Date/Time    ECG 12 Lead [[id number]] Collected: 05/29/21 1333     Updated: 05/30/21 0821     QT Interval 413 ms     Narrative:      HEART RATE= 70  bpm  RR Interval= 864  ms  NC Interval= 199  ms  P Horizontal Axis= -29  deg  P Front Axis= 47  deg  QRSD Interval= 110  ms  QT Interval= 413  ms  QRS Axis= -48  deg  T Wave Axis= 45  deg  - BORDERLINE ECG -  Sinus rhythm  Probable left ventricular  hypertrophy  When compared with ECG of 23-May-2021 12:49:50,  Significant repolarization change  Electronically Signed By: Román Mcnair (Joseph) 30-May-2021 08:20:51  Date and Time of Study: 2021-05-29 13:33:44          Results for orders placed during the hospital encounter of 05/29/21    Duplex Hemodialysis Access CAR    Interpretation Summary  · Patent right radiocephalic AV fistula with an anastomotic stenosis and adequate volume flow. 4 mm cephalic vein tributary proximally.          No radiology results for the last 7 days        Xrays, labs reviewed personally by physician.    Medication Review:   I have reviewed the patient's current medication list      Scheduled Meds  calcitriol, 0.25 mcg, Oral, Daily  cloNIDine, 0.3 mg, Oral, TID  famotidine, 20 mg, Oral, Daily  folic acid, 1 mg, Oral, Daily  losartan, 50 mg, Oral, Daily  predniSONE, 20 mg, Oral, Daily With Breakfast  sevelamer, 800 mg, Oral, TID With Meals  sodium bicarbonate, 650 mg, Oral, TID  sodium chloride, 10 mL, Intravenous, Q12H        Meds Infusions       Meds PRN  •  acetaminophen **OR** acetaminophen **OR** acetaminophen  •  acetaminophen  •  albumin human  •  hydrALAZINE  •  labetalol  •  melatonin  •  nitroglycerin  •  ondansetron **OR** ondansetron  •  pharmacy  •  sodium chloride  •  sodium chloride        Assessment/Plan   Assessment/Plan     Active Hospital Problems    Diagnosis  POA   • **ESRD (end stage renal disease) on dialysis (CMS/[facility]) [N18.6, Z99.2]  Not Applicable     Priority: High  Follow nephrology recommendations.  Continue hemodialysis.     • Anemia, chronic disease [D63.8]  Yes     Priority: Medium  Monitor H&H.    Thrombocytopenia  Follow hematology oncology recommendations.     • Essential hypertension [I10]  Yes     Priority: Medium  Treat with Catapres.     • Essential hypertension [I10]  Treat with losartan.  Follow nephrology recommendations.      Yes   • Chronic kidney disease on chronic dialysis (CMS/[facility]) [N18.6,  Z99.2]  Follow nephrology recommendations.      Thrombocytopenia.  Follow heme-onc recommendations.    DVT prophylaxis   -Heparin and SCDs.      Not Applicable      Resolved Hospital Problems    Diagnosis Date Resolved POA      Yes       MEDICAL DECISION MAKING COMPLEXITY BY PROBLEM:     Continue appropriate patient's home medications for other chronic medical conditions.  Continue the present level of care.  Patient and family agreed with the plan of care.          VTE Prophylaxis -   Mechanical Order History:      Ordered        05/30/21 1432  Place Sequential Compression Device  Once         05/30/21 1432  Maintain Sequential Compression Device  Continuous                 Pharmalogical Order History:      Ordered     Dose Route Frequency Stop    05/29/21 1710  heparin (porcine) 5000 UNIT/ML injection 5,000 Units  Status:  Discontinued      5,000 Units SC Every 12 Hours Scheduled 05/30/21 1432                  Code Status -   Code Status and Medical Interventions:   Ordered at: 05/29/21 1628     Code Status:    CPR     Medical Interventions (Level of Support Prior to Arrest):    Full       This patient has been examined wearing appropriate Personal Protective Equipment and discussed with hospital infection control department, [facility] department, infectious disease specialist and pulmonologist. 06/08/21        Discharge Planning  It may discharge in the next 1 to 2 days.        Electronically signed by Benjy Nayak MD, 06/08/21, 13:53 EDT.  [facility] Hospitalist Team

## 2021-06-08 NOTE — PLAN OF CARE
Problem: Adult Inpatient Plan of Care  Goal: Plan of Care Review  Flowsheets (Taken 6/8/2021 9728)  Progress: no change  Plan of Care Reviewed With: patient  Outcome Summary: pt rested most of the night, was given his regular home BP medication dose tonight and BP has been lower tonight   Goal Outcome Evaluation:  Plan of Care Reviewed With: patient        Progress: no change  Outcome Summary: pt rested most of the night, was given his regular home BP medication dose tonight and BP has been lower tonight

## 2021-06-08 NOTE — CASE MANAGEMENT/SOCIAL WORK
Continued Stay Note   [facility]     Patient Name: Josh Klein  MRN: [id number]  Today's Date: 6/8/2021    Admit Date: 5/29/2021    Discharge Plan     Row Name 06/08/21 1517       Plan    Plan  Anticipate routine home with brother. Unable to arrange new outpatient HD for patient due to uninsured status, see note for details.    Plan Comments  Received notice from Fabiola at [facility] that patient has been declined by their financial center and offered to send a contract to the hospital, for the hospital to negotiate a contract and pay for patients HD treatments. Case discussed with director Neetu NGUYEN, Bren Hung, Enoch Schaefer, Dong GLORIA, and Erin Fournier [facility]. Ultimately received notice from leadership that the hospital is unable to contract with [facility] to pay for patients HD treatments. They advised patient return to the ER if he felt ill or unwell. If patients income status changes, he potentially could try and qualify for emergency [place] Medicaid at that time. Called and informed Fabiola at [facility] of this information and she is going to forward to her  for assistance but she advised there is not currently any assistance programs in the area at this time. Updated Dr. Nayak and Dr. Streeter via secure chat. Will update that patient after he receives this news from his providers to answer additional questitons he may have.        Phone communication or documentation only - no physical contact with patient or family.      Hollie Stewart RN

## 2021-06-08 NOTE — CONSULTS
Nutrition Services    Patient Name: Josh Klein  YOB: 1970  MRN: [id number]  Admission date: 5/29/2021    PPE Documentation        PPE Worn By Provider Mask, eyewear   PPE Worn By Patient  None     PROGRESS NOTE      Encounter Information: 6/8: Rd visited pt room for LOS x 10 days. PT stated he has been eating well and he enjoys the food. EMR confirms PO intake is 75%+. No c/o N/V. Pt states he has lost some weight but he expects its from dialysis. Weight is stable per EMR, no weight loss recorded this admission. Pt states he eats 3 meals per day at home and has no issues with getting kcal or protein. RD will monitor per protocol.    NFPE - no signs of malnutrition present.       PO Diet: Diet Renal; 2gm Na+   PO Supplements: None   PO Intake:  %       Nutrition support orders: -    Nutrition support review: -       Labs (reviewed below): Reviewed        GI Function:  BM 6/7       Nutrition Intervention: Continue current diet order       Results from last 7 days   Lab Units 06/05/21  0301   SODIUM mmol/L 134*   POTASSIUM mmol/L 4.8   CHLORIDE mmol/L 93*   CO2 mmol/L 22.0   BUN mg/dL 67*   CREATININE mg/dL 9.17*   CALCIUM mg/dL 8.5*   GLUCOSE mg/dL 141*     Results from last 7 days   Lab Units 06/08/21  0530   HEMOGLOBIN g/dL 9.6*   HEMATOCRIT % 29.4*     COVID19   Date Value Ref Range Status   05/29/2021 Not Detected Not Detected - Ref. Range Final     Lab Results   Component Value Date    HGBA1C 5.0 05/30/2021       RD to follow up per protocol.    Electronically signed by:  Carmeal Aguilar RD  06/08/21 14:45 EDT

## 2021-06-08 NOTE — PROGRESS NOTES
"RENAL/[facility]:     LOS: 7 days    Patient Care Team:  Provider, No Known as PCP - Devan Drew MD as Consulting Physician (Hematology and Oncology)    Chief Complaint:  ESRD    Subjective     Interval History:   Chart reviewed.  BP improved.  Awaiting HD.    Objective     Vital Sign Min/Max for last 24 hours  Temp  Min: 98 °F (36.7 °C)  Max: 98.9 °F (37.2 °C)   BP  Min: 142/73  Max: 198/103   Pulse  Min: 77  Max: 100   Resp  Min: 14  Max: 18   SpO2  Min: 96 %  Max: 97 %   No data recorded   Weight  Min: 60.9 kg (134 lb 4.2 oz)  Max: 60.9 kg (134 lb 4.2 oz)     Flowsheet Rows      First Filed Value   Admission Height  162.6 cm (64\") Documented at 05/29/2021 1252   Admission Weight  61.9 kg (136 lb 7.4 oz) Documented at 05/29/2021 1252          I/O this shift:  In: 360 [P.O.:360]  Out: -   I/O last 3 completed shifts:  In: 720 [P.O.:720]  Out: -     Physical Exam:  GEN: Awake, NAD  ENT: PERRL, EOMI, MMM  NECK: Supple, no JVD  CHEST: CTAB, no W/R/C  CV: RRR, no M/G/R  ABD: Soft, NT, +BS  SKIN: Warm and Dry  NEURO: CN's intact      WBC WBC   Date Value Ref Range Status   06/08/2021 14.20 (H) 3.40 - 10.80 10*3/mm3 Final   06/07/2021 15.60 (H) 3.40 - 10.80 10*3/mm3 Final   06/06/2021 12.60 (H) 3.40 - 10.80 10*3/mm3 Final      HGB Hemoglobin   Date Value Ref Range Status   06/08/2021 9.6 (L) 13.0 - 17.7 g/dL Final   06/07/2021 10.4 (L) 13.0 - 17.7 g/dL Final   06/06/2021 10.8 (L) 13.0 - 17.7 g/dL Final      HCT Hematocrit   Date Value Ref Range Status   06/08/2021 29.4 (L) 37.5 - 51.0 % Final   06/07/2021 31.6 (L) 37.5 - 51.0 % Final   06/06/2021 32.8 (L) 37.5 - 51.0 % Final      Platlets No results found for: LABPLAT   MCV MCV   Date Value Ref Range Status   06/08/2021 94.6 79.0 - 97.0 fL Final   06/07/2021 94.6 79.0 - 97.0 fL Final   06/06/2021 94.0 79.0 - 97.0 fL Final          Sodium No results found for: NA   Potassium No results found for: K   Chloride No results found for: CL   CO2 No results found for: " CO2   BUN No results found for: BUN   Creatinine No results found for: CREATININE   Calcium No results found for: CALCIUM   PO4 No results found for: CAPO4   Albumin No results found for: ALBUMIN   Magnesium No results found for: MG   Uric Acid No results found for: URICACID        Results Review:     I reviewed the patient's new clinical results.    calcitriol, 0.25 mcg, Oral, Daily  cloNIDine, 0.3 mg, Oral, TID  famotidine, 20 mg, Oral, Daily  folic acid, 1 mg, Oral, Daily  losartan, 50 mg, Oral, Daily  predniSONE, 20 mg, Oral, Daily With Breakfast  sevelamer, 800 mg, Oral, TID With Meals  sodium bicarbonate, 650 mg, Oral, TID  sodium chloride, 10 mL, Intravenous, Q12H           Medication Review: Reviewed    Assessment/Plan       ESRD (end stage renal disease) on dialysis (CMS/[facility])    Chronic kidney disease on chronic dialysis (CMS/[facility])    Essential hypertension    Anemia, chronic disease    Essential hypertension    Thrombocytopenia (CMS/H[facility])      Plan: Continue HD TTS.  Working on an outpatient HD spot.  BP better on increased Clonidine.  No heparin with dialysis.  Platelets improved.  Social work following.      Hitesh Streeter MD   [facility]  06/08/21  10:53 EDT

## 2021-06-08 NOTE — NURSING NOTE
Patient left the floor to go to dialysis during shift change. Will do a head-to-toe once he arrives back to the floor.

## 2021-06-08 NOTE — PROGRESS NOTES
Hematology/Oncology Inpatient Progress Note    PATIENT NAME: Josh Klein  : 1970  MRN: [id number]    CHIEF COMPLAINT: Acute thrombocytopenia    HISTORY OF PRESENT ILLNESS:    50 y.o. male presented to [facility] ER on 2021 reporting a need for hemodialysis.  He is an undocumented immigrant and has not been able to obtain insurance to receive hemodialysis as an outpatient.  He reported weakness dyspnea on exertion and a low blood pressure for the past 24 hours.  His chest x-ray was unremarkable. WBC 7.2, hemoglobin 10.3, MCV 94.2 and platelets 269,000.  His platelets started declining the following day, and were 33,000 on 2021.  PT 10.7 (9.6-11.7, PTT 25.2 (24-31), vitamin B12 424 (211-946), and LFTs were normal. According to the medical record, he was diagnosed with end-stage renal disease at [facility] on 2021 and was started on hemodialysis.  Since that time he has been unable to obtain insurance coverage for treatment of his renal disease.     21  Hematology/Oncology was consulted by Dr. Nayak for acute thrombocytopenia.  He denied any history of bleeding or low blood counts.     Past Medical History: End-stage renal disease and hypertension.  Surgical History:  Vascular surgery.  Social History: He lives in [place], [place] with his brother's family.  He has worked in construction.  He does not smoke or use alcohol or other recreational drugs.  Family History: His mother had hypertension.  Allergies: No known allergies.     PCP: Provider, No Known     INTERVAL HISTORY:  • 2021 -  Prednisone 60 mg by mouth daily was started on 2021.White blood count 7.8, hemoglobin 10.6, MCV 93.7, platelets 56,000. Folate 4.34 (4.78-24.20), iron 68 (), iron saturation 21 (20-50) transferrin 215 (200-360), TIBC 320 (298-536), reticulocytes 0.55 (0.7-1.9), haptoglobin 122 (). Started folic acid 1 mg p.o. once daily.  Received Venofer 200 mg IV and Retacrit  30,000 units subcu x1 dose per nephrology on 5/31/2021 with hemodialysis.  Received heparin with dialysis.   • 6/2/2021- On 6/1/2021 evening, platelets 22,000, CMV IgM <30.0 (<30.0), erythropoietin 9.4 (2.6-18.5).   · 6/3/2021 -  EBV IgM <36.0 (<36.0), copper 101 (), and SPEP with no M spike or monoclonal protein is not present.   · 6/4/2021-platelet count 68,000.  HIT 0.133 (0-0.4) however low platelet count felt secondary to heparin.  Prednisone decreased to 40 mg by mouth daily.  Holding heparin with dialysis.  · 6/5/2021-platelets 128,000.  Prednisone discontinued.   · 6/6/2021-platelets dropped to 80,000.  Prednisone 40 mg p.o. daily started.  · 6/7/2021-platelets 119,000, prednisone dose decreased to 20 mg daily.  · 6/8/2021-platelets 147,000.  Hemoglobin 9.6.    History of present illness reviewed since last visit and changes noted on 06/08/21.    Subjective   Reports no fever or cough but has some yellow phlegm.    ROS:  Review of Systems   Constitutional: Negative for activity change, chills, fatigue, fever and unexpected weight change.   HENT: Positive for postnasal drip. Negative for congestion, dental problem, hearing loss, mouth sores, nosebleeds, sore throat and trouble swallowing.    Eyes: Negative for photophobia and visual disturbance.   Respiratory: Negative for cough, choking, chest tightness and shortness of breath.    Cardiovascular: Negative for chest pain, palpitations and leg swelling.   Gastrointestinal: Negative for abdominal distention, abdominal pain, blood in stool, constipation, diarrhea, nausea and vomiting.   Endocrine: Negative for cold intolerance and heat intolerance.   Genitourinary: Negative for decreased urine volume, difficulty urinating, dysuria, frequency, hematuria and urgency.   Musculoskeletal: Negative for arthralgias and gait problem.   Skin: Negative for rash and wound.   Neurological: Negative for dizziness, tremors, seizures, syncope, weakness, light-headedness,  "numbness and headaches.   Hematological: Negative for adenopathy. Does not bruise/bleed easily.   Psychiatric/Behavioral: Negative for confusion and hallucinations. The patient is not nervous/anxious.    All other systems reviewed and are negative.     MEDICATIONS:    Scheduled Meds:  calcitriol, 0.25 mcg, Oral, Daily  cloNIDine, 0.3 mg, Oral, TID  famotidine, 20 mg, Oral, Daily  folic acid, 1 mg, Oral, Daily  losartan, 50 mg, Oral, Daily  predniSONE, 20 mg, Oral, Daily With Breakfast  sevelamer, 800 mg, Oral, TID With Meals  sodium bicarbonate, 650 mg, Oral, TID  sodium chloride, 10 mL, Intravenous, Q12H       Continuous Infusions:      PRN Meds:  •  acetaminophen **OR** acetaminophen **OR** acetaminophen  •  acetaminophen  •  albumin human  •  hydrALAZINE  •  labetalol  •  melatonin  •  nitroglycerin  •  ondansetron **OR** ondansetron  •  pharmacy  •  sodium chloride  •  sodium chloride     ALLERGIES:    Allergies   Allergen Reactions   • Heparin Other (See Comments)     Patient is thrombocytopenic.  Avoiding heparin at this time.       Objective    VITALS:   /67 (BP Location: Right arm, Patient Position: Lying)   Pulse 67   Temp 98.2 °F (36.8 °C) (Oral)   Resp 18   Ht 162.6 cm (64\")   Wt 60.9 kg (134 lb 4.2 oz)   SpO2 97%   BMI 23.05 kg/m²     PHYSICAL EXAM:  Physical Exam  Vitals and nursing note reviewed.   Constitutional:       General: He is not in acute distress.     Appearance: Normal appearance. He is well-developed and normal weight. He is not diaphoretic.   HENT:      Head: Normocephalic and atraumatic.      Right Ear: External ear normal.      Left Ear: External ear normal.      Nose: Nose normal.      Mouth/Throat:      Mouth: Mucous membranes are moist.      Pharynx: Oropharynx is clear. No oropharyngeal exudate or posterior oropharyngeal erythema.      Comments: Dental fillings  Eyes:      General: No scleral icterus.     Extraocular Movements: Extraocular movements intact.      " Conjunctiva/sclera: Conjunctivae normal.      Pupils: Pupils are equal, round, and reactive to light.      Comments: Eyeglasses   Cardiovascular:      Rate and Rhythm: Normal rate and regular rhythm.      Heart sounds: Normal heart sounds. No murmur heard.        Comments: Right chest wall Shiley.  Monitor leads.  Pulmonary:      Effort: Pulmonary effort is normal. No respiratory distress.      Breath sounds: Normal breath sounds. No stridor. No wheezing, rhonchi or rales.   Abdominal:      General: Abdomen is flat. Bowel sounds are normal. There is no distension.      Palpations: Abdomen is soft. There is no mass.      Tenderness: There is no abdominal tenderness. There is no guarding.   Genitourinary:     Comments: Deferred   Musculoskeletal:         General: No swelling, tenderness or deformity. Normal range of motion.      Cervical back: Normal range of motion and neck supple.      Right lower leg: No edema.      Left lower leg: No edema.      Comments: LUE BP cuff and IV   Lymphadenopathy:      Cervical: No cervical adenopathy.      Upper Body:      Right upper body: No supraclavicular adenopathy.      Left upper body: No supraclavicular adenopathy.   Skin:     General: Skin is warm and dry.      Coloration: Skin is not jaundiced or pale.      Findings: No bruising, erythema or rash.   Neurological:      General: No focal deficit present.      Mental Status: He is alert and oriented to person, place, and time.      Coordination: Coordination normal.   Psychiatric:         Mood and Affect: Mood normal.         Behavior: Behavior normal.         Thought Content: Thought content normal.         RECENT LABS:  Lab Results (last 24 hours)     Procedure Component Value Units Date/Time    CBC & Differential [[id number]]  (Abnormal) Collected: 06/08/21 0530    Specimen: Blood Updated: 06/08/21 0549    Narrative:      The following orders were created for panel order CBC & Differential.  Procedure                                Abnormality         Status                     ---------                               -----------         ------                     CBC Auto Differential[[id number]]        Abnormal            Final result                 Please view results for these tests on the individual orders.    CBC Auto Differential [[id number]]  (Abnormal) Collected: 06/08/21 0530    Specimen: Blood Updated: 06/08/21 0549     WBC 14.20 10*3/mm3      RBC 3.11 10*6/mm3      Hemoglobin 9.6 g/dL      Hematocrit 29.4 %      MCV 94.6 fL      MCH 30.8 pg      MCHC 32.6 g/dL      RDW 14.9 %      RDW-SD 47.7 fl      MPV 8.6 fL      Platelets 147 10*3/mm3      Neutrophil % 70.7 %      Lymphocyte % 17.7 %      Monocyte % 9.4 %      Eosinophil % 1.9 %      Basophil % 0.3 %      Neutrophils, Absolute 10.10 10*3/mm3      Lymphocytes, Absolute 2.50 10*3/mm3      Monocytes, Absolute 1.30 10*3/mm3      Eosinophils, Absolute 0.30 10*3/mm3      Basophils, Absolute 0.00 10*3/mm3      nRBC 0.1 /100 WBC         PENDING RESULTS: n/a    IMAGING REVIEWED:  No radiology results for the last day    I have reviewed the patient's labs, imaging, reports, and other clinician documentation.    Assessment/Plan   ASSESSMENT  1. Thrombocytopenia: Acute and had been significantly low.  No overt bleeding.  LFTs and coags normal.  Only possible culprit drug is heparin with dialysis and he has folate deficiency. CMV and EBV IgMs were negative. Started treatment with oral steroids on 5/31/2021 with improvement in platelet count noted initially but platelets declined after dialysis and use of heparin.  Received heparin  with dialysis on 6/1/2021. 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  Prednisone discontinued 6/4/2021 with increase in platelets 128,000, but restarted next day for platelet count drop to 80,000.  Prednisone being slowly tapered now.  Platelets normalized again 6/8.  2. Anemia/Folate deficiency anemia:  Anemia probably chronic due to ESRD.  Anemia work-up revealed folate deficiency. Received Venofer and epo on 5/31/2021 with dialysis per nephrology.  Started on folate supplementation.  3. ESRD/Chronic kidney disease on chronic dialysis/Essential hypertension: On hemodialysis per nephrology. No heparin with HD due to thrombocytopenia. Needs emergency medicaid for outpatient HD.   4. Uninsured status:  consulted.     PLAN  1. Continue to hold all heparin including heparin with hemodialysis.  2. Ensure outpatient dialysis center is aware to hold heparin.  3. Continue prednisone 20 mg daily.    4. Follow CBC.   5. Continue folic acid 1 mg p.o. once daily.  6. Continue EPO and Venofer per nephrology with hemodialysis.  7. Outpatient platelet antibodies.    Patient seen and evaluated by Dr. Bray.  Electronically signed by LAISHA Wilcox, 06/08/21, 2:57 PM EDT.        I have personally performed a face-to-face diagnostic evaluation on this patient.  I have discussed the case with Rosalba Cage NP, have edited/reviewed the note, and agree with the care plan.  Patient complaining of a cough.  On exam, he has right chest wall Valium left arm IV.  Expect improvement again.  Will slowly taper down prednisone dose.        I discussed the patients findings and my recommendations with patient.    Electronically signed by Devan Bray MD, 06/08/21, 6:01 PM EDT.

## 2021-06-09 VITALS
BODY MASS INDEX: 21.82 KG/M2 | HEIGHT: 64 IN | SYSTOLIC BLOOD PRESSURE: 116 MMHG | HEART RATE: 91 BPM | RESPIRATION RATE: 16 BRPM | OXYGEN SATURATION: 97 % | DIASTOLIC BLOOD PRESSURE: 70 MMHG | TEMPERATURE: 98.9 F | WEIGHT: 127.8 LBS

## 2021-06-09 PROBLEM — K21.9 GERD WITHOUT ESOPHAGITIS: Status: ACTIVE | Noted: 2021-06-09

## 2021-06-09 PROBLEM — E53.8 FOLATE DEFICIENCY: Status: ACTIVE | Noted: 2021-06-09

## 2021-06-09 LAB
APTT PPP: 24.8 SECONDS (ref 24–31)
BASOPHILS # BLD AUTO: 0 10*3/MM3 (ref 0–0.2)
BASOPHILS # BLD AUTO: 0 10*3/MM3 (ref 0–0.2)
BASOPHILS NFR BLD AUTO: 0.1 % (ref 0–1.5)
BASOPHILS NFR BLD AUTO: 0.2 % (ref 0–1.5)
DEPRECATED RDW RBC AUTO: 48.6 FL (ref 37–54)
DEPRECATED RDW RBC AUTO: 49 FL (ref 37–54)
EOSINOPHIL # BLD AUTO: 0.2 10*3/MM3 (ref 0–0.4)
EOSINOPHIL # BLD AUTO: 0.3 10*3/MM3 (ref 0–0.4)
EOSINOPHIL NFR BLD AUTO: 1 % (ref 0.3–6.2)
EOSINOPHIL NFR BLD AUTO: 1.8 % (ref 0.3–6.2)
ERYTHROCYTE [DISTWIDTH] IN BLOOD BY AUTOMATED COUNT: 15.1 % (ref 12.3–15.4)
ERYTHROCYTE [DISTWIDTH] IN BLOOD BY AUTOMATED COUNT: 15.2 % (ref 12.3–15.4)
HCT VFR BLD AUTO: 33.1 % (ref 37.5–51)
HCT VFR BLD AUTO: 34.2 % (ref 37.5–51)
HGB BLD-MCNC: 11.1 G/DL (ref 13–17.7)
HGB BLD-MCNC: 11.2 G/DL (ref 13–17.7)
INR PPP: 0.97 (ref 0.93–1.1)
LYMPHOCYTES # BLD AUTO: 2.5 10*3/MM3 (ref 0.7–3.1)
LYMPHOCYTES # BLD AUTO: 3.1 10*3/MM3 (ref 0.7–3.1)
LYMPHOCYTES NFR BLD AUTO: 16.8 % (ref 19.6–45.3)
LYMPHOCYTES NFR BLD AUTO: 20.1 % (ref 19.6–45.3)
MCH RBC QN AUTO: 30.9 PG (ref 26.6–33)
MCH RBC QN AUTO: 31.3 PG (ref 26.6–33)
MCHC RBC AUTO-ENTMCNC: 32.9 G/DL (ref 31.5–35.7)
MCHC RBC AUTO-ENTMCNC: 33.6 G/DL (ref 31.5–35.7)
MCV RBC AUTO: 93.1 FL (ref 79–97)
MCV RBC AUTO: 94 FL (ref 79–97)
MONOCYTES # BLD AUTO: 1.5 10*3/MM3 (ref 0.1–0.9)
MONOCYTES # BLD AUTO: 1.8 10*3/MM3 (ref 0.1–0.9)
MONOCYTES NFR BLD AUTO: 10.1 % (ref 5–12)
MONOCYTES NFR BLD AUTO: 11.7 % (ref 5–12)
NEUTROPHILS NFR BLD AUTO: 10.3 10*3/MM3 (ref 1.7–7)
NEUTROPHILS NFR BLD AUTO: 10.7 10*3/MM3 (ref 1.7–7)
NEUTROPHILS NFR BLD AUTO: 66.2 % (ref 42.7–76)
NEUTROPHILS NFR BLD AUTO: 72 % (ref 42.7–76)
NRBC BLD AUTO-RTO: 0 /100 WBC (ref 0–0.2)
NRBC BLD AUTO-RTO: 0 /100 WBC (ref 0–0.2)
PLATELET # BLD AUTO: 37 10*3/MM3 (ref 140–450)
PLATELET # BLD AUTO: 46 10*3/MM3 (ref 140–450)
PMV BLD AUTO: 10.1 FL (ref 6–12)
PMV BLD AUTO: 9.4 FL (ref 6–12)
PROTHROMBIN TIME: 10.7 SECONDS (ref 9.6–11.7)
RBC # BLD AUTO: 3.56 10*6/MM3 (ref 4.14–5.8)
RBC # BLD AUTO: 3.63 10*6/MM3 (ref 4.14–5.8)
RBC MORPH BLD: NORMAL
SMALL PLATELETS BLD QL SMEAR: NORMAL
WBC # BLD AUTO: 14.9 10*3/MM3 (ref 3.4–10.8)
WBC # BLD AUTO: 15.6 10*3/MM3 (ref 3.4–10.8)
WBC MORPH BLD: NORMAL

## 2021-06-09 PROCEDURE — 63710000001 PREDNISONE PER 1 MG: Performed by: INTERNAL MEDICINE

## 2021-06-09 PROCEDURE — 85610 PROTHROMBIN TIME: CPT | Performed by: NURSE PRACTITIONER

## 2021-06-09 PROCEDURE — 85730 THROMBOPLASTIN TIME PARTIAL: CPT | Performed by: NURSE PRACTITIONER

## 2021-06-09 PROCEDURE — 85025 COMPLETE CBC W/AUTO DIFF WBC: CPT | Performed by: NURSE PRACTITIONER

## 2021-06-09 PROCEDURE — 85007 BL SMEAR W/DIFF WBC COUNT: CPT | Performed by: NURSE PRACTITIONER

## 2021-06-09 PROCEDURE — 99239 HOSP IP/OBS DSCHRG MGMT >30: CPT | Performed by: INTERNAL MEDICINE

## 2021-06-09 RX ORDER — FAMOTIDINE 20 MG/1
20 TABLET, FILM COATED ORAL DAILY
Qty: 30 TABLET | Refills: 2 | Status: SHIPPED | OUTPATIENT
Start: 2021-06-09 | End: 2021-09-07

## 2021-06-09 RX ORDER — PREDNISONE 20 MG/1
TABLET ORAL
Qty: 10 TABLET | Refills: 0 | Status: SHIPPED | OUTPATIENT
Start: 2021-06-09 | End: 2021-06-24 | Stop reason: HOSPADM

## 2021-06-09 RX ORDER — FOLIC ACID 1 MG/1
1 TABLET ORAL DAILY
Qty: 30 TABLET | Refills: 2 | Status: SHIPPED | OUTPATIENT
Start: 2021-06-10 | End: 2021-09-08

## 2021-06-09 RX ORDER — LOSARTAN POTASSIUM 50 MG/1
50 TABLET ORAL DAILY
Qty: 30 TABLET | Refills: 2 | Status: SHIPPED | OUTPATIENT
Start: 2021-06-09 | End: 2021-09-07

## 2021-06-09 RX ADMIN — FOLIC ACID 1 MG: 1 TABLET ORAL at 08:40

## 2021-06-09 RX ADMIN — PREDNISONE 20 MG: 20 TABLET ORAL at 08:40

## 2021-06-09 RX ADMIN — FAMOTIDINE 20 MG: 20 TABLET ORAL at 08:39

## 2021-06-09 RX ADMIN — CALCITRIOL CAPSULES 0.25 MCG 0.25 MCG: 0.25 CAPSULE ORAL at 08:40

## 2021-06-09 RX ADMIN — SEVELAMER CARBONATE 800 MG: 800 TABLET, FILM COATED ORAL at 08:39

## 2021-06-09 RX ADMIN — Medication 10 ML: at 08:40

## 2021-06-09 RX ADMIN — SODIUM BICARBONATE 650 MG: 650 TABLET ORAL at 08:44

## 2021-06-09 NOTE — CASE MANAGEMENT/SOCIAL WORK
Social Work Assessment  [facility]     Patient Name: Josh Klein  MRN: [id number]  Today's Date: 6/9/2021    Admit Date: 5/29/2021    Discharge Plan     Row Name 06/09/21 1614       Plan    Plan Comments  See CM documentation regarding conversation at bedside with patient. All questions answered. With patient's permission, contacted pt's niece (Prabha) to explain the same information. Patient was given information for [facility] - application in [place], attempted to schedule appointment but [facility] was having computer issues. Encouraged patient to complete and f/u. Provided niece with number to call regarding pending Medicaid application and advised to check weekly. Patient to see nephrology weekly and that first appoitment was scheduled for 6/17 by CM. Niece verbalized understanding. No other questions at this time.     Met with patient in room wearing PPE: mask, face shield/goggles, gloves, gown.      Maintained distance greater than six feet and spent less than 15 minutes in the room.      JAKOB Trejo    Phone: [phone]  Cell: [phone]  Fax: [phone]  [email or web address]

## 2021-06-09 NOTE — PROGRESS NOTES
Hematology/Oncology Inpatient Progress Note    PATIENT NAME: Josh Klein  : 1970  MRN: [id number]    CHIEF COMPLAINT: Acute thrombocytopenia    HISTORY OF PRESENT ILLNESS:    50 y.o. male presented to [facility] ER on 2021 reporting a need for hemodialysis.  He is an undocumented immigrant and has not been able to obtain insurance to receive hemodialysis as an outpatient.  He reported weakness dyspnea on exertion and a low blood pressure for the past 24 hours.  His chest x-ray was unremarkable. WBC 7.2, hemoglobin 10.3, MCV 94.2 and platelets 269,000.  His platelets started declining the following day, and were 33,000 on 2021.  PT 10.7 (9.6-11.7, PTT 25.2 (24-31), vitamin B12 424 (211-946), and LFTs were normal. According to the medical record, he was diagnosed with end-stage renal disease at [facility] on 2021 and was started on hemodialysis.  Since that time he has been unable to obtain insurance coverage for treatment of his renal disease.     21  Hematology/Oncology was consulted by Dr. Nayak for acute thrombocytopenia.  He denied any history of bleeding or low blood counts.     Past Medical History: End-stage renal disease and hypertension.  Surgical History:  Vascular surgery.  Social History: He lives in [place], [place] with his brother's family.  He has worked in construction.  He does not smoke or use alcohol or other recreational drugs.  Family History: His mother had hypertension.  Allergies: No known allergies.     PCP: Provider, No Known     INTERVAL HISTORY:  • 2021 -  Prednisone 60 mg by mouth daily was started on 2021.White blood count 7.8, hemoglobin 10.6, MCV 93.7, platelets 56,000. Folate 4.34 (4.78-24.20), iron 68 (), iron saturation 21 (20-50) transferrin 215 (200-360), TIBC 320 (298-536), reticulocytes 0.55 (0.7-1.9), haptoglobin 122 (). Started folic acid 1 mg p.o. once daily.  Received Venofer 200 mg IV and Retacrit  30,000 units subcu x1 dose per nephrology on 5/31/2021 with hemodialysis.  Received heparin with dialysis.   • 6/2/2021- On 6/1/2021 evening, platelets 22,000, CMV IgM <30.0 (<30.0), erythropoietin 9.4 (2.6-18.5).   · 6/3/2021 -  EBV IgM <36.0 (<36.0), copper 101 (), and SPEP with no M spike or monoclonal protein is not present.   · 6/4/2021-platelet count 68,000.  HIT 0.133 (0-0.4) however low platelet count felt secondary to heparin.  Prednisone decreased to 40 mg by mouth daily.  Holding heparin with dialysis.  · 6/5/2021-platelets 128,000.  Prednisone discontinued.   · 6/6/2021-platelets dropped to 80,000.  Prednisone 40 mg p.o. daily started.  · 6/7/2021-platelets 119,000, prednisone dose decreased to 20 mg daily.  · 6/8/2021-platelets 147,000.  Hemoglobin 9.6.  · 6/9/2021-platelets 37,000.  Hemoglobin 11.1.    History of present illness reviewed since last visit and changes noted on 06/09/21.    Subjective   Denies any complaints or overt bleeding.  He is ready for discharge.    ROS:  Review of Systems   Constitutional: Negative for activity change, chills, fatigue, fever and unexpected weight change.   HENT: Negative for congestion, dental problem, hearing loss, mouth sores, nosebleeds, postnasal drip, sore throat and trouble swallowing.    Eyes: Negative for photophobia and visual disturbance.   Respiratory: Negative for cough, choking, chest tightness and shortness of breath.    Cardiovascular: Negative for chest pain, palpitations and leg swelling.   Gastrointestinal: Negative for abdominal distention, abdominal pain, blood in stool, constipation, diarrhea, nausea and vomiting.   Endocrine: Negative for cold intolerance and heat intolerance.   Genitourinary: Negative for decreased urine volume, difficulty urinating, dysuria, enuresis, frequency, hematuria and urgency.   Musculoskeletal: Negative for arthralgias and gait problem.   Skin: Negative for rash and wound.   Neurological: Negative for  "dizziness, tremors, seizures, syncope, weakness, light-headedness, numbness and headaches.   Hematological: Negative for adenopathy. Does not bruise/bleed easily.   Psychiatric/Behavioral: Negative for confusion and hallucinations. The patient is not nervous/anxious.    All other systems reviewed and are negative.     MEDICATIONS:    Scheduled Meds:  calcitriol, 0.25 mcg, Oral, Daily  cloNIDine, 0.3 mg, Oral, TID  famotidine, 20 mg, Oral, Daily  folic acid, 1 mg, Oral, Daily  losartan, 50 mg, Oral, Daily  predniSONE, 20 mg, Oral, Daily With Breakfast  sevelamer, 800 mg, Oral, TID With Meals  sodium bicarbonate, 650 mg, Oral, TID  sodium chloride, 10 mL, Intravenous, Q12H       Continuous Infusions:      PRN Meds:  •  acetaminophen **OR** acetaminophen **OR** acetaminophen  •  acetaminophen  •  hydrALAZINE  •  labetalol  •  melatonin  •  nitroglycerin  •  ondansetron **OR** ondansetron  •  pharmacy  •  sodium chloride  •  sodium chloride     ALLERGIES:    Allergies   Allergen Reactions   • Heparin Other (See Comments)     Patient is thrombocytopenic.  Avoiding heparin at this time.       Objective    VITALS:   BP 94/62 (BP Location: Left arm, Patient Position: Lying)   Pulse 84   Temp 98.3 °F (36.8 °C) (Oral)   Resp 15   Ht 162.6 cm (64\")   Wt 58 kg (127 lb 12.8 oz)   SpO2 96%   BMI 21.94 kg/m²     PHYSICAL EXAM:  Physical Exam  Vitals and nursing note reviewed.   Constitutional:       General: He is not in acute distress.     Appearance: Normal appearance. He is well-developed and normal weight. He is not diaphoretic.   HENT:      Head: Normocephalic and atraumatic.      Right Ear: External ear normal.      Left Ear: External ear normal.      Nose: Nose normal.      Mouth/Throat:      Mouth: Mucous membranes are moist.      Pharynx: Oropharynx is clear. No oropharyngeal exudate or posterior oropharyngeal erythema.      Comments: Dental fillings  Eyes:      General: No scleral icterus.     Extraocular " Movements: Extraocular movements intact.      Conjunctiva/sclera: Conjunctivae normal.      Pupils: Pupils are equal, round, and reactive to light.      Comments: Eyeglasses   Cardiovascular:      Rate and Rhythm: Normal rate and regular rhythm.      Heart sounds: Normal heart sounds. No murmur heard.        Comments: Right chest wall Shiley.  Monitor leads.  Pulmonary:      Effort: Pulmonary effort is normal. No respiratory distress.      Breath sounds: Normal breath sounds. No stridor. No wheezing, rhonchi or rales.   Abdominal:      General: Abdomen is flat. Bowel sounds are normal. There is no distension.      Palpations: Abdomen is soft. There is no mass.      Tenderness: There is no abdominal tenderness. There is no guarding or rebound.   Genitourinary:     Comments: Deferred   Musculoskeletal:         General: No swelling, tenderness or deformity. Normal range of motion.      Cervical back: Normal range of motion and neck supple.      Right lower leg: No edema.      Left lower leg: No edema.      Comments: LUE IV.   Lymphadenopathy:      Cervical: No cervical adenopathy.      Upper Body:      Right upper body: No supraclavicular adenopathy.      Left upper body: No supraclavicular adenopathy.   Skin:     General: Skin is warm and dry.      Coloration: Skin is not jaundiced or pale.      Findings: No bruising, erythema or rash.   Neurological:      General: No focal deficit present.      Mental Status: He is alert and oriented to person, place, and time.      Coordination: Coordination normal.   Psychiatric:         Mood and Affect: Mood normal.         Behavior: Behavior normal.         Thought Content: Thought content normal.         RECENT LABS:  Lab Results (last 24 hours)     Procedure Component Value Units Date/Time    CBC & Differential [[id number]]  (Abnormal) Collected: 06/09/21 0444    Specimen: Blood Updated: 06/09/21 0553    Narrative:      The following orders were created for panel order CBC &  Differential.  Procedure                               Abnormality         Status                     ---------                               -----------         ------                     Scan Slide[[id number]]                                       Final result               CBC Auto Differential[[id number]]        Abnormal            Final result                 Please view results for these tests on the individual orders.    Scan Slide [[id number]] Collected: 06/09/21 0444    Specimen: Blood Updated: 06/09/21 0553     RBC Morphology Normal     WBC Morphology Normal     Platelet Estimate Decreased    Narrative:      Slide reviewed to check platelets    CBC Auto Differential [[id number]]  (Abnormal) Collected: 06/09/21 0444    Specimen: Blood Updated: 06/09/21 0552     WBC 14.90 10*3/mm3      RBC 3.56 10*6/mm3      Hemoglobin 11.1 g/dL      Hematocrit 33.1 %      MCV 93.1 fL      MCH 31.3 pg      MCHC 33.6 g/dL      RDW 15.1 %      RDW-SD 48.6 fl      MPV 9.4 fL      Platelets 37 10*3/mm3      Neutrophil % 72.0 %      Lymphocyte % 16.8 %      Monocyte % 10.1 %      Eosinophil % 1.0 %      Basophil % 0.1 %      Neutrophils, Absolute 10.70 10*3/mm3      Lymphocytes, Absolute 2.50 10*3/mm3      Monocytes, Absolute 1.50 10*3/mm3      Eosinophils, Absolute 0.20 10*3/mm3      Basophils, Absolute 0.00 10*3/mm3      nRBC 0.0 /100 WBC     Narrative:      Appended report. These results have been appended to a previously verified report.    Basic Metabolic Panel [[id number]]  (Abnormal) Collected: 06/08/21 1918    Specimen: Blood Updated: 06/08/21 1951     Glucose 186 mg/dL      BUN 75 mg/dL      Creatinine 8.97 mg/dL      Sodium 136 mmol/L      Potassium 4.2 mmol/L      Chloride 97 mmol/L      CO2 22.0 mmol/L      Calcium 8.1 mg/dL      eGFR  African Amer 8 mL/min/1.73      Comment: <15 Indicative of kidney failure.        eGFR Non African Amer 6 mL/min/1.73      Comment: <15 Indicative of kidney failure.        BUN/Creatinine  Ratio 8.4     Anion Gap 17.0 mmol/L     Narrative:      GFR Normal >60  Chronic Kidney Disease <60  Kidney Failure <15          PENDING RESULTS: coags, path review for platelet clumping, CBC in blue top    IMAGING REVIEWED:  No radiology results for the last day    I have reviewed the patient's labs, imaging, reports, and other clinician documentation.    Assessment/Plan   ASSESSMENT  1. Thrombocytopenia: Acute and significantly low.  No overt bleeding.  LFTs and coags normal.    Multifactorial and possibly due to heparin with dialysis, folate deficiency and ITP. CMV and EBV IgMs were negative. Started treatment with oral steroids on 5/31/2021 with improvement in platelet count noted initially but platelets declined after dialysis and use of heparin.  Received heparin  with dialysis on 6/1/2021. 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  Prednisone discontinued 6/4/2021 with increase in platelets 128,000, but restarted the next day for platelet count drop to 80,000.  Prednisone being slowly tapered now.  Platelets normalized again 6/8 with acute fall 6/9.  Will check for possible platelet clumping and recheck CBC in a blue top tube.  No apparent heparin given in dialysis.  2. Anemia/Folate deficiency anemia: Anemia probably chronic due to ESRD.  Anemia work-up revealed folate deficiency. Received Venofer and epo on 5/31/2021 with dialysis per nephrology.  Started on folate supplementation.  Stable.  3. ESRD/Chronic kidney disease on chronic dialysis/Essential hypertension: On hemodialysis per nephrology. No heparin with HD due to thrombocytopenia. Needs emergency medicaid for outpatient HD.   4. Uninsured status:  consulted.  Remains uninsured and no HD possible as outpatient due to no insurance or assistance program.  Patient has been advised to return to the ER if feeling poorly.     PLAN  1. Continue to hold all heparin, including heparin with  hemodialysis.  2. Ensure outpatient dialysis center is aware to hold heparin.  3. Continue prednisone 20 mg daily.    4. Follow CBC.   5. Continue folic acid 1 mg p.o. once daily.  6. Continue EPO and Venofer per nephrology with hemodialysis.  7. Outpatient platelet antibodies.  8. Recheck CBC in blue top tube.  9. Check with pathology for possible platelet clumping on CBC.  10. Check coags.    Patient seen and evaluated by Dr. Bray.  Electronically signed by LAISHA Wilcox, 06/09/21, 8:45 AM EDT.        I have personally performed a face-to-face diagnostic evaluation on this patient.  I have discussed the case with Rosalba Cage NP, have edited/reviewed the note, and agree with the care plan.  Patient claims to be feeling all right.  On examination, he has dental fillings, cardiac monitor leads and left arm IV.  Platelet count has again acutely dropped.  This pattern is usually caused with drug interaction or possibly a lab error.  Work-up as above.    I discussed the patients findings and my recommendations with patient.    Electronically signed by Devan Bray MD, 06/09/21, 12:07 PM EDT.

## 2021-06-09 NOTE — PLAN OF CARE
Goal Outcome Evaluation:  Plan of Care Reviewed With: patient        Progress: no change  Outcome Summary: Patient had dialysis last night.They removed 4 kilos off. Patient tolerated well. Patient has been resting well over night. His BP was a little low this morning. Patient will probably d/c today. They are unable to set him up with oupt HD due to no insurance. He will just have to come into the ER if he feels like he needs it. Will continue to monitor.

## 2021-06-09 NOTE — DISCHARGE SUMMARY
[facility] Medicine Services  DISCHARGE SUMMARY        Prepared For PCP:  Provider, No Known    Patient Name: Josh Klein  : 1970  MRN: [id number]      Date of Admission:   2021    Date of Discharge:  2021    Length of stay:  LOS: 8 days     Hospital Course     Presenting Problem:   Chronic kidney disease on chronic dialysis (CMS/HCC) [N18.6, Z99.2]  ESRD (end stage renal disease) on dialysis (CMS/HCC) [N18.6, Z99.2]      Active Hospital Problems    Diagnosis  POA   • **ESRD (end stage renal disease) on dialysis (CMS/HCC) [N18.6, Z99.2]  Not Applicable     Priority: High   • Thrombocytopenia (CMS/HCC) [D69.6]  Yes     Priority: Medium   • Anemia, chronic disease [D63.8]  Yes     Priority: Medium    Folate deficiency    GERD.     • Essential hypertension [I10]  Yes     Priority: Medium   • Essential hypertension [I10]  Yes   • Chronic kidney disease on chronic dialysis (CMS/HCC) [N18.6, Z99.2]  Not Applicable      Resolved Hospital Problems    Diagnosis Date Resolved POA   • Essential hypertension [I10] 2021 Yes     Hospital Course:  Josh Klein is a 50 y.o. male   Patient is a 50-year-old male with past medical history of anemia of chronic kidney disease, hypertension, end-stage renal disease on hemodialysis who presented to the emergency room because of hypotension.  Patient was recently in the hospital and completed his hemodialysis.  Patient is unable to get dialysis because of his lack of insurance.  Patient needs emergent dialysis and was recommended for admission for further treatment and management.  Nephrology consult was completed.  Hematology consult was completed because of thrombocytopenia.  Anemia of CKD was treated with EPO and Venofer.  Thrombocytopenia was treated with steroids.  End-stage renal disease was treated with hemodialysis.  Folate deficiency was treated with a folate replacement.  Hypertension was treated with losartan.  GERD was  treated with Pepcid.  Patient reported significant improvement in his symptoms after over 11 days in the hospital and requested to be discharged home.  Patient was advised to take his medications as prescribed.  Discharge medications are as per medication reconciliation list.  Patient was advised to follow-up with his primary care physician within 3 to 5 days of discharge.  Patient was advised to return to the emergency room for hemodialysis and if he experiences any recurrence of his symptoms.  Patient and family agreed with the plan and patient was discharged in stable condition.                Recommendation for Outpatient Providers:             Reasons For Change In Medications and Indications for New Medications:        Day of Discharge     HPI:   Patient is a 50-year-old male with past medical history of anemia of chronic kidney disease, hypertension, end-stage renal disease on hemodialysis who presented to the emergency room because of hypotension.  Patient was recently in the hospital and completed his hemodialysis.  Patient is unable to get dialysis because of his lack of insurance.  Patient needs emergent dialysis and was recommended for admission for further treatment and management.  Nephrology consult was completed.  Hematology consult was completed because of thrombocytopenia.  Patient reported significant improvement in his symptoms after over 11 days in the hospital and requested to be discharged home.    Vital Signs:   Temp:  [98.1 °F (36.7 °C)-98.4 °F (36.9 °C)] 98.3 °F (36.8 °C)  Heart Rate:  [67-85] 84  Resp:  [14-18] 15  BP: ()/(62-99) 94/62     Physical Exam:  Physical Exam  Vitals and nursing note reviewed.   Constitutional:       General: He is not in acute distress.     Comments: Patient was sitting comfortably in bed and in no acute distress.   HENT:      Head: Normocephalic.      Nose: Nose normal.      Mouth/Throat:      Mouth: Mucous membranes are dry.      Pharynx: Oropharynx is  clear.   Eyes:      Extraocular Movements: Extraocular movements intact.      Conjunctiva/sclera: Conjunctivae normal.      Pupils: Pupils are equal, round, and reactive to light.   Cardiovascular:      Pulses: Normal pulses.      Heart sounds: No murmur heard.   No friction rub. No gallop.       Comments: S1 and S2 present.  No tachycardia.  Pulmonary:      Effort: Pulmonary effort is normal.      Breath sounds: No stridor. No wheezing or rales.   Chest:      Chest wall: No tenderness.   Abdominal:      General: Bowel sounds are normal. There is no distension.      Palpations: Abdomen is soft.      Tenderness: There is no abdominal tenderness. There is no right CVA tenderness or guarding.   Musculoskeletal:         General: No swelling, tenderness, deformity or signs of injury.      Cervical back: Normal range of motion. No rigidity.      Right lower leg: No edema.      Left lower leg: No edema.   Skin:     General: Skin is warm and dry.      Capillary Refill: Capillary refill takes less than 2 seconds.      Coloration: Skin is not jaundiced.      Findings: No bruising, erythema, lesion or rash.   Neurological:      General: No focal deficit present.   Psychiatric:      Comments: No agitation.            Pertinent  and/or Most Recent Results     Results from last 7 days   Lab Units 06/09/21  0444 06/08/21  1918 06/08/21  0530 06/07/21  0355 06/06/21  0327 06/05/21  0301 06/04/21  0255 06/03/21  1110   WBC 10*3/mm3 14.90*  --  14.20* 15.60* 12.60* 13.20* 11.40* 20.10*   HEMOGLOBIN g/dL 11.1*  --  9.6* 10.4* 10.8* 11.1* 11.2* 11.2*   HEMATOCRIT % 33.1*  --  29.4* 31.6* 32.8* 33.1* 33.8* 33.9*   PLATELETS 10*3/mm3 37*  --  147 119* 80* 128* 68* 36*   SODIUM mmol/L  --  136  --   --   --  134*  --   --    POTASSIUM mmol/L  --  4.2  --   --   --  4.8  --   --    CHLORIDE mmol/L  --  97*  --   --   --  93*  --   --    CO2 mmol/L  --  22.0  --   --   --  22.0  --   --    BUN mg/dL  --  75*  --   --   --  67*  --   --     CREATININE mg/dL  --  8.97*  --   --   --  9.17*  --   --    GLUCOSE mg/dL  --  186*  --   --   --  141*  --   --    CALCIUM mg/dL  --  8.1*  --   --   --  8.5*  --   --            Invalid input(s): PROT, LABALBU        Invalid input(s): TG, LDLCALC, LDLREALC        Brief Urine Lab Results     None          Microbiology Results Abnormal     Procedure Component Value - Date/Time    COVID PRE-OP / PRE-PROCEDURE SCREENING ORDER (NO ISOLATION) - Swab, Nasopharynx [[id number]]  (Normal) Collected: 05/29/21 1634    Lab Status: Final result Specimen: Swab from Nasopharynx Updated: 05/29/21 1703    Narrative:      The following orders were created for panel order COVID PRE-OP / PRE-PROCEDURE SCREENING ORDER (NO ISOLATION) - Swab, Nasopharynx.  Procedure                               Abnormality         Status                     ---------                               -----------         ------                     COVID-19,CEPHEID,COR/DARRYN...[[id number]]  Normal              Final result                 Please view results for these tests on the individual orders.    COVID-19,CEPHEID,COR/DARRYN/PAD IN-HOUSE(OR EMERGENT/ADD-ON),NP SWAB IN TRANSPORT MEDIA 3-4 HR TAT, RT-PCR - Swab, Nasopharynx [[id number]]  (Normal) Collected: 05/29/21 1634    Lab Status: Final result Specimen: Swab from Nasopharynx Updated: 05/29/21 1703     COVID19 Not Detected    Narrative:      Fact sheet for providers: https://www.fda.gov/media/989298/download     Fact sheet for patients: https://www.fda.gov/media/992447/download  Fact sheet for providers: https://www.fda.gov/media/000401/download    Fact sheet for patients: https://www.fda.gov/media/641302/download    Test performed by PCR.          XR Chest 1 View    Result Date: 5/29/2021  Impression: No acute cardiopulmonary abnormality or significant change.  Electronically Signed By-Iesha Winter MD On:5/29/2021 3:19 PM This report was finalized on [id number] by  Iesha Winter MD.    XR Chest 1  View    Result Date: 5/23/2021  Impression: No active disease.  Electronically Signed By-Dank Rodriguez MD On:5/23/2021 1:36 PM This report was finalized on [id number] by  Dank Rodriguez MD.      Results for orders placed during the hospital encounter of 05/29/21    Duplex Hemodialysis Access CAR    Interpretation Summary  · Patent right radiocephalic AV fistula with an anastomotic stenosis and adequate volume flow. 4 mm cephalic vein tributary proximally.      Results for orders placed during the hospital encounter of 05/29/21    Duplex Hemodialysis Access CAR    Interpretation Summary  · Patent right radiocephalic AV fistula with an anastomotic stenosis and adequate volume flow. 4 mm cephalic vein tributary proximally.                  Test Results Pending at Discharge        Procedures Performed           Consults:   Consults     Date and Time Order Name Status Description    6/3/2021 12:48 PM Inpatient Vascular Surgery Consult Completed     5/31/2021 11:43 AM Hematology & Oncology Inpatient Consult Completed     5/30/2021  4:24 PM Inpatient Hospitalist Consult Completed     5/23/2021  4:12 PM Nephrology (on -call MD unless specified) Completed             Discharge Details        Discharge Medications      New Medications      Instructions Start Date   famotidine 20 MG tablet  Commonly known as: PEPCID   20 mg, Oral, Daily      predniSONE 20 MG tablet  Commonly known as: DELTASONE   Take 1 tablet by mouth daily x 5 days, then take one-half tablet daily x 5 days.         Changes to Medications      Instructions Start Date   losartan 50 MG tablet  Commonly known as: COZAAR  What changed:   · medication strength  · how much to take   50 mg, Oral, Daily         Continue These Medications      Instructions Start Date   acetaminophen 325 MG tablet  Commonly known as: TYLENOL   650 mg, Oral, Every 6 Hours PRN      calcitriol 0.25 MCG capsule  Commonly known as: ROCALTROL   0.25 mcg, Oral, Daily      cloNIDine 0.3 MG  tablet  Commonly known as: CATAPRES   0.3 mg, Oral, 3 Times Daily      NIFEdipine XL 90 MG 24 hr tablet  Commonly known as: PROCARDIA XL   90 mg, Oral, Daily      sevelamer 800 MG tablet  Commonly known as: RENAGEL   800 mg, Oral, 3 Times Daily With Meals      sodium bicarbonate 650 MG tablet   650 mg, Oral, 3 Times Daily             Allergies   Allergen Reactions   • Heparin Other (See Comments)     Patient is thrombocytopenic.  Avoiding heparin at this time.         Discharge Disposition:  Home or Self Care    Diet:  Hospital:  Diet Order   Procedures   • Diet Renal; 2gm Na+         Discharge Activity: As tolerated.        CODE STATUS:    Code Status and Medical Interventions:   Ordered at: 05/29/21 1628     Code Status:    CPR     Medical Interventions (Level of Support Prior to Arrest):    Full         Follow-up Appointments  No future appointments.          Condition on Discharge:      Stable      This patient has been examined wearing appropriate Personal Protective Equipment and discussed with hospital infection control department, [facility] department, infectious disease specialist and pulmonologist. 06/09/21      Electronically signed by Benjy Nayak MD, 06/09/21, 8:32 AM EDT.      Time: I spent  40  minutes on this discharge activity which included face-to-face encounter with the patient/reviewing the data in the system/coordination of the care with the nursing staff as well as consultants/documentation/entering orders.

## 2021-06-09 NOTE — CASE MANAGEMENT/SOCIAL WORK
Continued Stay Note  [facility]     Patient Name: Josh Klein  MRN: [id number]  Today's Date: 6/9/2021    Admit Date: 5/29/2021    Discharge Plan     Row Name 06/09/21 1534       Plan    Plan Comments  Met with patient at bedside with Dong GLORIA with the use of  via telephone call with [employer]. ID # [id number]. Explained to patient regarding being unable to currently arrange an OP HD chair time related to uninsured status. Dong GLORIA explained that his emergency [place] Medicaid application has been resubmitted and is pending. He was give information on how to call to check the status of his application weekly. Informed patient that if/when is application is approved to contact Dr. Streeter to arrange an OP HD chair time. Also advised patient that if he felt poorly/unwell to return to the ER. Dong GLORIA provided information on [facility] for PCP services. Called and arranged patient an appt with Dr. Guo on 6/17, updated AVS. All of patients questions were answered. Dong GLORIA called to update gerardo Armando. See note for details. Updated RN, Dr. Nayak, Dr. Hays and Dr. Streeter via secure chat.    Final Discharge Disposition Code  01 - home or self-care    Final Note  Home        Met with patient in room wearing PPE: mask.    Maintained distance closer than six feet and spent less than 15 minutes in the room.      Hollie Stewart RN

## 2021-06-09 NOTE — DISCHARGE INSTR - APPOINTMENTS
Dr. Streeter (renal)   [address] [facility] [address]  Appointment- June 17th at 10:15 am  
4

## 2021-06-10 ENCOUNTER — READMISSION MANAGEMENT (OUTPATIENT)
Dept: CALL CENTER | Facility: HOSPITAL | Age: 51
End: 2021-06-10

## 2021-06-10 NOTE — OUTREACH NOTE
Prep Survey      Responses   [facility] patient discharged from?  [facility]   Is LACE score < 7 ?  No   Emergency Room discharge w/ pulse ox?  No   Eligibility  Readm Mgmt   Discharge diagnosis  ESRD (   Does the patient have one of the following disease processes/diagnoses(primary or secondary)?  Other   Does the patient have Home health ordered?  No   Is there a DME ordered?  No   Prep survey completed?  Yes          Laura Gonzalez RN

## 2021-06-11 DIAGNOSIS — D69.6 THROMBOCYTOPENIA (HCC): Primary | ICD-10-CM

## 2021-06-11 DIAGNOSIS — D63.8 ANEMIA, CHRONIC DISEASE: ICD-10-CM

## 2021-06-13 ENCOUNTER — HOSPITAL ENCOUNTER (OUTPATIENT)
Facility: HOSPITAL | Age: 51
Setting detail: OBSERVATION
Discharge: HOME | End: 2021-06-14
Attending: HOSPITALIST | Admitting: INTERNAL MEDICINE

## 2021-06-13 DIAGNOSIS — Z99.2 HEMODIALYSIS PATIENT (HCC): Primary | ICD-10-CM

## 2021-06-13 LAB — SARS-COV-2 RNA PNL SPEC NAA+PROBE: NOT DETECTED

## 2021-06-13 PROCEDURE — 99284 EMERGENCY DEPT VISIT MOD MDM: CPT

## 2021-06-13 PROCEDURE — G0378 HOSPITAL OBSERVATION PER HR: HCPCS

## 2021-06-13 PROCEDURE — U0003 INFECTIOUS AGENT DETECTION BY NUCLEIC ACID (DNA OR RNA); SEVERE ACUTE RESPIRATORY SYNDROME CORONAVIRUS 2 (SARS-COV-2) (CORONAVIRUS DISEASE [COVID-19]), AMPLIFIED PROBE TECHNIQUE, MAKING USE OF HIGH THROUGHPUT TECHNOLOGIES AS DESCRIBED BY CMS-2020-01-R: HCPCS | Performed by: HOSPITALIST

## 2021-06-13 PROCEDURE — 99219 PR INITIAL OBSERVATION CARE/DAY 50 MINUTES: CPT | Performed by: HOSPITALIST

## 2021-06-13 PROCEDURE — G0257 UNSCHED DIALYSIS ESRD PT HOS: HCPCS

## 2021-06-13 PROCEDURE — C9803 HOPD COVID-19 SPEC COLLECT: HCPCS

## 2021-06-13 RX ORDER — ALBUMIN (HUMAN) 12.5 G/50ML
12.5 SOLUTION INTRAVENOUS AS NEEDED
Status: DISCONTINUED | OUTPATIENT
Start: 2021-06-13 | End: 2021-06-14 | Stop reason: HOSPADM

## 2021-06-13 RX ORDER — ACETAMINOPHEN 650 MG/1
650 SUPPOSITORY RECTAL EVERY 4 HOURS PRN
Status: DISCONTINUED | OUTPATIENT
Start: 2021-06-13 | End: 2021-06-14 | Stop reason: HOSPADM

## 2021-06-13 RX ORDER — LOSARTAN POTASSIUM 50 MG/1
50 TABLET ORAL DAILY
Status: DISCONTINUED | OUTPATIENT
Start: 2021-06-14 | End: 2021-06-14 | Stop reason: HOSPADM

## 2021-06-13 RX ORDER — FAMOTIDINE 20 MG/1
20 TABLET, FILM COATED ORAL DAILY
Status: DISCONTINUED | OUTPATIENT
Start: 2021-06-14 | End: 2021-06-14 | Stop reason: HOSPADM

## 2021-06-13 RX ORDER — CLONIDINE HYDROCHLORIDE 0.1 MG/1
0.3 TABLET ORAL 3 TIMES DAILY
Status: DISCONTINUED | OUTPATIENT
Start: 2021-06-13 | End: 2021-06-14 | Stop reason: HOSPADM

## 2021-06-13 RX ORDER — CHOLECALCIFEROL (VITAMIN D3) 125 MCG
5 CAPSULE ORAL NIGHTLY PRN
Status: DISCONTINUED | OUTPATIENT
Start: 2021-06-13 | End: 2021-06-14 | Stop reason: HOSPADM

## 2021-06-13 RX ORDER — ACETAMINOPHEN 160 MG/5ML
650 SOLUTION ORAL EVERY 4 HOURS PRN
Status: DISCONTINUED | OUTPATIENT
Start: 2021-06-13 | End: 2021-06-14 | Stop reason: HOSPADM

## 2021-06-13 RX ORDER — PREDNISONE 10 MG/1
10 TABLET ORAL
Status: DISCONTINUED | OUTPATIENT
Start: 2021-06-15 | End: 2021-06-14 | Stop reason: HOSPADM

## 2021-06-13 RX ORDER — ONDANSETRON 2 MG/ML
4 INJECTION INTRAMUSCULAR; INTRAVENOUS EVERY 6 HOURS PRN
Status: DISCONTINUED | OUTPATIENT
Start: 2021-06-13 | End: 2021-06-14 | Stop reason: HOSPADM

## 2021-06-13 RX ORDER — FOLIC ACID 1 MG/1
1 TABLET ORAL DAILY
Status: DISCONTINUED | OUTPATIENT
Start: 2021-06-14 | End: 2021-06-14 | Stop reason: HOSPADM

## 2021-06-13 RX ORDER — CALCITRIOL 0.25 UG/1
0.25 CAPSULE, LIQUID FILLED ORAL DAILY
Status: DISCONTINUED | OUTPATIENT
Start: 2021-06-14 | End: 2021-06-14 | Stop reason: HOSPADM

## 2021-06-13 RX ORDER — SODIUM BICARBONATE 650 MG/1
650 TABLET ORAL 3 TIMES DAILY
Status: DISCONTINUED | OUTPATIENT
Start: 2021-06-13 | End: 2021-06-14 | Stop reason: HOSPADM

## 2021-06-13 RX ORDER — ACETAMINOPHEN 325 MG/1
650 TABLET ORAL EVERY 6 HOURS PRN
Status: DISCONTINUED | OUTPATIENT
Start: 2021-06-13 | End: 2021-06-13 | Stop reason: SDUPTHER

## 2021-06-13 RX ORDER — CALCIUM CARBONATE 200(500)MG
2 TABLET,CHEWABLE ORAL 2 TIMES DAILY PRN
Status: DISCONTINUED | OUTPATIENT
Start: 2021-06-13 | End: 2021-06-14 | Stop reason: HOSPADM

## 2021-06-13 RX ORDER — ACETAMINOPHEN 325 MG/1
650 TABLET ORAL EVERY 4 HOURS PRN
Status: DISCONTINUED | OUTPATIENT
Start: 2021-06-13 | End: 2021-06-14 | Stop reason: HOSPADM

## 2021-06-13 RX ORDER — ONDANSETRON 4 MG/1
4 TABLET, FILM COATED ORAL EVERY 6 HOURS PRN
Status: DISCONTINUED | OUTPATIENT
Start: 2021-06-13 | End: 2021-06-14 | Stop reason: HOSPADM

## 2021-06-13 NOTE — ED NOTES
Pt here sts he is needing dialysis.  Pt d/c from here 4 days ago and was told to come back to the ED when dialysis was needed.     Ashely Wheatley, LPN  06/13/21 2067

## 2021-06-13 NOTE — H&P
[facility] Medicine Services      Patient Name: Jsoh Klein  : 1970  MRN: [id number]  Primary Care Physician: Provider, No Known  Date of admission: 2021    Patient Care Team:  Provider, No Known as PCP - Devan Drew MD as Consulting Physician (Hematology and Oncology)          Subjective   History Present Illness     Chief Complaint:   Chief Complaint   Patient presents with   • Abnormal Lab     Pt reports he is here because he needs dialysis Last dialysis  4 days ago       History of present illness:  Mr. Klein is a 50 y.o.  presents to [facility] complaining of feeling like he needs dialysis. The patient reports that he does not have an outpatient dialysis center to go to and he has to come to the hospital 20 needs dialysis. He has a fistula in his right arm. He reports his last dialysis was 5 days ago. He denies any recent illness or weight changes. No swelling or orthopnea. The patient complains of bilateral upper extremity tingling distally in a glove like distribution that extends to his wrist on the right and his elbow on the left. He denies any weakness.         ROS   12 point review of systems was reviewed and was negative except as above.        Personal History     Past Medical History:   Past Medical History:   Diagnosis Date   • History of renal dialysis    • Hypertension    • Renal disorder        Surgical History:      Past Surgical History:   Procedure Laterality Date   • VASCULAR SURGERY      tunneled catheter           Family History: family history includes Kidney disease in his mother. Family History was reviewed.     Social History:  reports that he has quit smoking. He has quit using smokeless tobacco. He reports that he does not drink alcohol and does not use drugs.      Medications:  Prior to Admission medications    Medication Sig Start Date End Date Taking? Authorizing Provider   acetaminophen (TYLENOL) 325 MG tablet  Take 650 mg by mouth Every 6 (Six) Hours As Needed for Mild Pain .   Yes Ever Christy MD   calcitriol (ROCALTROL) 0.25 MCG capsule Take 0.25 mcg by mouth Daily.   Yes Ever Christy MD   cloNIDine (CATAPRES) 0.3 MG tablet Take 0.3 mg by mouth 3 (Three) Times a Day.   Yes Ever Christy MD   famotidine (PEPCID) 20 MG tablet Take 1 tablet by mouth Daily for 90 days. 6/9/21 9/7/21 Yes Benjy Nayak MD   folic acid (FOLVITE) 1 MG tablet Take 1 tablet by mouth Daily for 90 days. 6/10/21 9/8/21 Yes Benjy Nayak MD   losartan (COZAAR) 50 MG tablet Take 1 tablet by mouth Daily for 90 days. 6/9/21 9/7/21 Yes Benjy Nayak MD   predniSONE (DELTASONE) 20 MG tablet [place] 1 tableta por vía oral diariamente x 5 días, luego tome la mitad de la tableta diaria x 5 vance. 6/9/21  Yes Benjy Nayak MD   sodium bicarbonate 650 MG tablet Take 1 tablet by mouth 3 (Three) Times a Day. 5/24/21  Yes Fabiola Fernandes APRN   NIFEdipine XL (PROCARDIA XL) 90 MG 24 hr tablet Take 90 mg by mouth Daily.    Ever Christy MD   sevelamer (RENAGEL) 800 MG tablet Take 800 mg by mouth 3 (Three) Times a Day With Meals.    Ever Christy MD       Allergies:    Allergies   Allergen Reactions   • Heparin Other (See Comments)     Patient is thrombocytopenic.  Avoiding heparin at this time.       Objective   Objective     Vital Signs  Temp:  [98 °F (36.7 °C)] 98 °F (36.7 °C)  Heart Rate:  [69-74] 69  Resp:  [16] 16  BP: (113-117)/(64-70) 117/70  SpO2:  [98 %-99 %] 99 %  on   ;   Device (Oxygen Therapy): room air  Body mass index is 23.28 kg/m².    Physical Exam  Vital signs and nurses notes reviewed. Well-developed well-nourished gentleman comfortable on room air in no acute distress sitting up in bed awake and alert; mucous membranes moist; sclerae anicteric; neck supple; lungs clear to auscultation bilaterally; CV regular rate and rhythm; abdomen soft nontender nondistended with active bowel sounds;  extremities with no edema, cyanosis or calf tenderness; palpable pedal pulses bilaterally; right upper extremity forearm with an AV fistula with a palpable thrill; no Goss catheter.    Results Review:  I have personally reviewed most recent cardiac tracings, lab results and radiology images and interpretations .    Results from last 7 days   Lab Units 06/09/21  0914   WBC 10*3/mm3 15.60*   HEMOGLOBIN g/dL 11.2*   HEMATOCRIT % 34.2*   PLATELETS 10*3/mm3 46*   INR  0.97     Results from last 7 days   Lab Units 06/08/21  1918   SODIUM mmol/L 136   POTASSIUM mmol/L 4.2   CHLORIDE mmol/L 97*   CO2 mmol/L 22.0   BUN mg/dL 75*   CREATININE mg/dL 8.97*   GLUCOSE mg/dL 186*   CALCIUM mg/dL 8.1*     Estimated Creatinine Clearance: 8.4 mL/min (A) (by C-G formula based on SCr of 8.97 mg/dL (H)).  Brief Urine Lab Results     None          Microbiology Results (last 10 days)     ** No results found for the last 240 hours. **          ECG/EMG Results (most recent)     None          Results for orders placed during the hospital encounter of 05/29/21    Duplex Hemodialysis Access CAR    Interpretation Summary  · Patent right radiocephalic AV fistula with an anastomotic stenosis and adequate volume flow. 4 mm cephalic vein tributary proximally.          No radiology results for the last 7 days      Estimated Creatinine Clearance: 8.4 mL/min (A) (by C-G formula based on SCr of 8.97 mg/dL (H)).    Assessment/Plan   Assessment/Plan       Active Hospital Problems    Diagnosis  POA   • Hemodialysis patient (CMS/[facility]) [Z99.2]  Not Applicable      Resolved Hospital Problems   No resolved problems to display.     Assessment and plan:    End-stage renal disease dependent on hemodialysis  -The patient requires admission for emergency hemodialysis  - consulted to try to get outpatient dialysis arranged    Essential hypertension, chronic and controlled  -Continue clonidine, losartan and nifedipine    Anemia of chronic kidney  disease  -Monitor hemoglobin        VTE Prophylaxis -   Mechanical Order History:      Ordered        Signed and Held  Place Sequential Compression Device  Once         Signed and Held  Maintain Sequential Compression Device  Continuous                 Pharmalogical Order History:     None          CODE STATUS:    There are no questions and answers to display.       This patient has been examined wearing appropriate Personal Protective Equipment . 06/13/21      I discussed the patient's findings and my recommendations with patient.    Live iPad  was used during the case with the patient. He denied any further questions and all questions were answered to his satisfaction.    Signature: Electronically signed by Lauren Crabtree MD, 06/13/21, 4:42 PM EDT.      [facility] Hospitalist Team

## 2021-06-13 NOTE — ED PROVIDER NOTES
Subjective   Patient presents with:  Abnormal Lab: Pt reports he is here because he needs dialysis Last dialysis  4 days ago    Provider, No Known  No LMP for male patient.   -- Heparin -- Other (See Comments)    --  Patient is thrombocytopenic.  Avoiding heparin at             this time.    Context: Patient is a 50-year-old male, history of end-stage renal disease, presents emergency department for hemodialysis.  Patient reports his last dialysis was 4 days ago, he is currently having issues with insurance, and is unable to get dialysis on an outpatient basis due to insurance.  He has no complaints at this time.  Specifically no chest pain shortness of breath.  No swelling.            Review of Systems   Constitutional: Negative for chills, diaphoresis, fatigue and fever.   Respiratory: Negative for chest tightness and shortness of breath.    Cardiovascular: Negative for chest pain, palpitations and leg swelling.   Gastrointestinal: Negative for abdominal pain.   Musculoskeletal: Negative for back pain and neck pain.   Neurological: Negative for dizziness, weakness, light-headedness and numbness.       Past Medical History:   Diagnosis Date   • History of renal dialysis    • Hypertension    • Renal disorder        Allergies   Allergen Reactions   • Heparin Other (See Comments)     Patient is thrombocytopenic.  Avoiding heparin at this time.       Past Surgical History:   Procedure Laterality Date   • VASCULAR SURGERY      tunneled catheter       Family History   Problem Relation Age of Onset   • Kidney disease Mother        Social History     Socioeconomic History   • Marital status: Single     Spouse name: Not on file   • Number of children: Not on file   • Years of education: Not on file   • Highest education level: Not on file   Tobacco Use   • Smoking status: Former Smoker   • Smokeless tobacco: Former User   Vaping Use   • Vaping Use: Never used   Substance and Sexual Activity   • Alcohol use: Never   • Drug  "use: Never   • Sexual activity: Defer           Objective   Physical Exam  Vitals and nursing note reviewed.   Constitutional:       General: He is not in acute distress.     Appearance: Normal appearance. He is not ill-appearing, toxic-appearing or diaphoretic.   HENT:      Head: Normocephalic and atraumatic.      Nose: Nose normal.      Mouth/Throat:      Mouth: Mucous membranes are moist.      Pharynx: Oropharynx is clear.   Eyes:      Extraocular Movements: Extraocular movements intact.      Conjunctiva/sclera: Conjunctivae normal.      Pupils: Pupils are equal, round, and reactive to light.   Cardiovascular:      Rate and Rhythm: Normal rate and regular rhythm.      Heart sounds: Normal heart sounds.   Pulmonary:      Breath sounds: Normal breath sounds.   Abdominal:      General: Bowel sounds are normal.      Palpations: Abdomen is soft.   Musculoskeletal:         General: Normal range of motion.      Cervical back: Normal range of motion and neck supple.   Skin:     General: Skin is warm and dry.      Capillary Refill: Capillary refill takes less than 2 seconds.   Neurological:      General: No focal deficit present.      Mental Status: He is alert.   Psychiatric:         Mood and Affect: Mood normal.         Behavior: Behavior normal.         Procedures           ED Course  ED Course as of Jun 13 1809   Sun Jun 13, 2021   1537 Spoke with Dr. Velazquez, nephrology    [LB]   1635 Patient updated via ipad with interpretor. [id number] no complaints at this time.  Awaiting room assignment.    [LB]      ED Course User Index  [LB] Sonam Castro, APRN      /63   Pulse 58   Temp 97.8 °F (36.6 °C)   Resp 18   Ht 162.6 cm (64\")   Wt 61.5 kg (135 lb 9.6 oz)   SpO2 99%   BMI 23.28 kg/m²   Labs Reviewed   COVID PRE-OP / PRE-PROCEDURE SCREENING ORDER (NO ISOLATION)    Narrative:     The following orders were created for panel order COVID PRE-OP / PRE-PROCEDURE SCREENING ORDER (NO ISOLATION) - Swab, " Nasopharynx.  Procedure                               Abnormality         Status                     ---------                               -----------         ------                     COVID-19,CEPHEID,COR/DARRYN...[[id number]]                      In process                   Please view results for these tests on the individual orders.   COVID-19,CEPHEID,COR/DARRYN/PAD/RICK IN-HOUSE(OR EMERGENT/ADD-ON),NP SWAB IN TRANSPORT MEDIA 3-4 HR TAT, RT-PCR     Medications - No data to display  No radiology results for the last day       Appropriate PPE was worn during the duration of the care for this patient while in the emergency department per [facility]                                MDM  Number of Diagnoses or Management Options  Hemodialysis patient (CMS/[facility])  Diagnosis management comments:     ED  Course----Patient was brought back to the emergency department room for evaluation and  placed on appropriate monitoring.  IV was established.  Vital signs have  been reviewed. Patient is afebrile. Non toxic in appearance. Alert and oriented to person, place, time and situation.  He has no complaints at this time.  Patient was advised to return to the emergency department for dialysis, as there is difficulty with his insurance, and establishing him an outpatient dialysis clinic.  Nephrology was consulted from the ED, dialysis orders were obtained per nursing, and patient admitted to the hospitalist service.                               Amount and/or Complexity of Data Reviewed  Discuss the patient with other providers: (Dr. Crabtree)    Patient Progress  Patient progress: stable      Final diagnoses:   Hemodialysis patient (CMS/[facility])       ED Disposition  ED Disposition     ED Disposition Condition Comment    Decision to Admit  Level of Care: Telemetry [5]   Diagnosis: Hemodialysis patient (CMS/[facility]) [[id number]]   Admitting Physician: KAREN CRABTREE [[id number]]   Attending Physician: KAREN CRABTREE [[id number]]            No  follow-up provider specified.       Medication List      No changes were made to your prescriptions during this visit.          Sonam Castro, APRN  06/13/21 1803

## 2021-06-14 ENCOUNTER — READMISSION MANAGEMENT (OUTPATIENT)
Dept: CALL CENTER | Facility: HOSPITAL | Age: 51
End: 2021-06-14

## 2021-06-14 VITALS
HEART RATE: 53 BPM | OXYGEN SATURATION: 98 % | RESPIRATION RATE: 18 BRPM | WEIGHT: 135.6 LBS | DIASTOLIC BLOOD PRESSURE: 79 MMHG | HEIGHT: 64 IN | TEMPERATURE: 98 F | SYSTOLIC BLOOD PRESSURE: 141 MMHG | BODY MASS INDEX: 23.15 KG/M2

## 2021-06-14 PROCEDURE — 99217 PR OBSERVATION CARE DISCHARGE MANAGEMENT: CPT | Performed by: INTERNAL MEDICINE

## 2021-06-14 PROCEDURE — G0378 HOSPITAL OBSERVATION PER HR: HCPCS

## 2021-06-14 PROCEDURE — 25010000002 ALTEPLASE 2 MG RECONSTITUTED SOLUTION 1 EACH VIAL: Performed by: INTERNAL MEDICINE

## 2021-06-14 RX ADMIN — FOLIC ACID 1 MG: 1 TABLET ORAL at 08:25

## 2021-06-14 RX ADMIN — SODIUM BICARBONATE 650 MG: 650 TABLET ORAL at 01:25

## 2021-06-14 RX ADMIN — WATER: 1 INJECTION INTRAMUSCULAR; INTRAVENOUS; SUBCUTANEOUS at 01:41

## 2021-06-14 RX ADMIN — LOSARTAN POTASSIUM 50 MG: 50 TABLET, FILM COATED ORAL at 08:24

## 2021-06-14 RX ADMIN — FAMOTIDINE 20 MG: 20 TABLET ORAL at 08:25

## 2021-06-14 RX ADMIN — SODIUM BICARBONATE 650 MG: 650 TABLET ORAL at 08:24

## 2021-06-14 RX ADMIN — CALCITRIOL CAPSULES 0.25 MCG 0.25 MCG: 0.25 CAPSULE ORAL at 08:24

## 2021-06-14 RX ADMIN — CLONIDINE HYDROCHLORIDE 0.3 MG: 0.1 TABLET ORAL at 08:25

## 2021-06-14 NOTE — DISCHARGE SUMMARY
Date of Admission: 6/13/2021    Date of Discharge:  6/14/2021    Length of stay:  LOS: 0 days     Admission Diagnosis:   Hemodialysis patient (CMS/[facility]) [Z99.2]        Discharge Diagnosis:     End-stage renal disease dependent on hemodialysis  - patient underwent emergent hemodialysis  - discussed with  and case management and patient unable to have outpatient dialysis arranged at this time  - Patient instructed to return to ED as needed for dialysis      Essential hypertension, chronic and controlled  - bp stable   - Continue clonidine, losartan and nifedipine     Anemia of chronic kidney disease       Active Hospital Problems    Diagnosis  POA   • Hemodialysis patient (CMS/[facility]) [Z99.2]  Not Applicable   • GERD without esophagitis [K21.9]  Yes   • Folate deficiency [E53.8]  Yes   • Thrombocytopenia (CMS/[facility]) [D69.6]  Yes   • Essential hypertension [I10]  Yes   • Anemia, chronic disease [D63.8]  Yes      Resolved Hospital Problems   No resolved problems to display.       Hospital Course:  Patient is a 50 y.o. male presented to [facility] complaining of feeling like he needs dialysis. The patient reports that he does not have an outpatient dialysis center to go to and he has to come to the hospital when he needs dialysis. He has a fistula in his right arm. He reports his last dialysis was 5 days ago. He denies any recent illness or weight changes. No swelling or orthopnea. The patient complains of bilateral upper extremity tingling distally in a glove like distribution that extends to his wrist on the right and his elbow on the left. He denies any weakness. Patient underwent hemodialysis he was seen by nephrology and he will be discharged home in stable condition.          Procedures Performed:  none         Consults:   Consults     Date and Time Order Name Status Description    6/13/2021  4:04 PM Hospitalist (on-call MD unless specified) Completed     6/13/2021  3:17 PM Nephrology (on -call  MD unless specified) Completed     6/3/2021 12:48 PM Inpatient Vascular Surgery Consult Completed     5/31/2021 11:43 AM Hematology & Oncology Inpatient Consult Completed     5/30/2021  4:24 PM Inpatient Hospitalist Consult Completed     5/23/2021  4:12 PM Nephrology (on -call MD unless specified) Completed           Vital Signs:  Temp:  [97.8 °F (36.6 °C)-98.6 °F (37 °C)] 98 °F (36.7 °C)  Heart Rate:  [53-76] 53  Resp:  [16-18] 18  BP: (112-168)/(59-88) 141/79        Physical Exam:  Physical Exam  Vitals reviewed.   Constitutional:       General: He is not in acute distress.     Appearance: He is normal weight.   HENT:      Head: Normocephalic and atraumatic.   Cardiovascular:      Rate and Rhythm: Normal rate and regular rhythm.   Pulmonary:      Effort: Pulmonary effort is normal. No respiratory distress.   Abdominal:      General: Bowel sounds are normal.      Palpations: Abdomen is soft.   Neurological:      Mental Status: He is alert.   Psychiatric:         Mood and Affect: Mood normal.         Behavior: Behavior normal.                 Pertinent Test Results:   Lab Results (last 72 hours)     Procedure Component Value Units Date/Time    COVID PRE-OP / PRE-PROCEDURE SCREENING ORDER (NO ISOLATION) - Swab, Nasopharynx [[id number]]  (Normal) Collected: 06/13/21 1745    Specimen: Swab from Nasopharynx Updated: 06/13/21 1839    Narrative:      The following orders were created for panel order COVID PRE-OP / PRE-PROCEDURE SCREENING ORDER (NO ISOLATION) - Swab, Nasopharynx.  Procedure                               Abnormality         Status                     ---------                               -----------         ------                     COVID-19,CEPHEID,COR/DARRYN...[[id number]]  Normal              Final result                 Please view results for these tests on the individual orders.    COVID-19,CEPHEID,COR/DARRYN/PAD/RICK IN-HOUSE(OR EMERGENT/ADD-ON),NP SWAB IN TRANSPORT MEDIA 3-4 HR TAT, RT-PCR - Swab,  Nasopharynx [[id number]]  (Normal) Collected: 06/13/21 1745    Specimen: Swab from Nasopharynx Updated: 06/13/21 1839     COVID19 Not Detected    Narrative:      Fact sheet for providers: https://www.fda.gov/media/446743/download     Fact sheet for patients: https://www.fda.gov/media/789235/download  Fact sheet for providers: https://www.fda.gov/media/288670/download     Fact sheet for patients: https://www.fda.gov/media/913394/download                  Imaging Results (All)     None                Discharge Disposition:  Home or Self Care    Discharge Medications:     Discharge Medications      Continue These Medications      Instructions Start Date   acetaminophen 325 MG tablet  Commonly known as: TYLENOL   650 mg, Oral, Every 6 Hours PRN      calcitriol 0.25 MCG capsule  Commonly known as: ROCALTROL   0.25 mcg, Oral, Daily      cloNIDine 0.3 MG tablet  Commonly known as: CATAPRES   0.3 mg, Oral, 3 Times Daily      famotidine 20 MG tablet  Commonly known as: PEPCID   20 mg, Oral, Daily      folic acid 1 MG tablet  Commonly known as: FOLVITE   1 mg, Oral, Daily      losartan 50 MG tablet  Commonly known as: COZAAR   50 mg, Oral, Daily      NIFEdipine XL 90 MG 24 hr tablet  Commonly known as: PROCARDIA XL   90 mg, Oral, Daily      predniSONE 20 MG tablet  Commonly known as: DELTASONE   Stacy 1 tableta por vía oral diariamente x 5 días, luego tome la mitad de la tableta diaria x 5 días.      sevelamer 800 MG tablet  Commonly known as: RENAGEL   800 mg, Oral, 3 Times Daily With Meals      sodium bicarbonate 650 MG tablet   650 mg, Oral, 3 Times Daily             Discharge Diet:   Diet Instructions     Diet: Renal      Discharge Diet: Renal          Activity at Discharge:   Activity Instructions     Activity as Tolerated            Follow-up Appointments  No future appointments.        Test Results Pending at Discharge:  none    Condition on Discharge:    stable     Risk for Readmission (LACE): Score: 5 (6/14/2021  6:01  GUS)          Piotr Murphy DO  06/14/21  15:53 EDT

## 2021-06-14 NOTE — OUTREACH NOTE
Medical Week 1 Survey      Responses   [facility] [place] patient discharged from?  [facility]   Does the patient have one of the following disease processes/diagnoses(primary or secondary)?  Other   Week 1 attempt successful?  No   Revoke  Readmitted          Sandra Sweet RN

## 2021-06-14 NOTE — CASE MANAGEMENT/SOCIAL WORK
Continued Stay Note  TREVON Nick     Patient Name: Josh Klein  MRN: [id number]  Today's Date: 6/14/2021    Admit Date: 6/13/2021    Discharge Plan     Row Name 06/14/21 3520       Plan    Plan Comments  Discussed with MD and Marlene Han that patient is unable to have OP HD arrangements made d/t financial barriers of no insurance/undocumented immigrant. Patient was instructed last hospitalization to return to hospital when HD was needed. Patient discharged prior to CM assessment.    Final Discharge Disposition Code  01 - home or self-care        Expected Discharge Date and Time     Expected Discharge Date Expected Discharge Time    Jun 14, 2021           Phone communication or documentation only - no physical contact with patient or family.    Aliya Deng RN     Office Phone: [phone]  Office Cell: [phone]

## 2021-06-14 NOTE — PLAN OF CARE
Goal Outcome Evaluation:  Plan of Care Reviewed With: patient        Progress: no change  Outcome Summary: pt admitted to floor from er just prior to shift change, pt admitted for dialysis, pt received dialysis starting after shift change, pt rested comfortably, voiced no complaints, vss, will continue to monitor.

## 2021-06-14 NOTE — CONSULTS
[facility]                                                                                             Nephrology Initial Consult Note    Patient Identification:  Name: Josh Klein MRN: [id number]  Age: 50 y.o. : 1970  Sex: male  Date:2021    Requesting Physician: As per consult order.  Reason for Consultation: ESRD  Information from:patient/ family/ chart      History of Present Illness: This is a 50 y.o. year old male who is an undocumented immigrant, has no current insurance and has been unable to get outpatient dialysis.  He returned to the ER last night for dialysis.  Blood pressures been stable he has no complaints.  No labs were done.  No shortness of breath or chest pain, good appetite with no nausea or vomiting    The following medical history and medications personally reviewed by me:    Problem List:   Patient Active Problem List    Diagnosis    • Hemodialysis patient (CMS/[facility]) [Z99.2]    • Folate deficiency [E53.8]    • GERD without esophagitis [K21.9]    • Thrombocytopenia (CMS/[facility]) [D69.6]    • Essential hypertension [I10]    • Anemia, chronic disease [D63.8]    • Essential hypertension [I10]    • Chronic kidney disease on chronic dialysis (CMS/[facility]) [N18.6, Z99.2]    • ESRD (end stage renal disease) on dialysis (CMS/[facility]) [N18.6, Z99.2]        Past Medical History:  Past Medical History:   Diagnosis Date   • History of renal dialysis    • Hypertension    • Renal disorder        Past Surgical History:  Past Surgical History:   Procedure Laterality Date   • VASCULAR SURGERY      tunneled catheter        Home Meds:   Medications Prior to Admission   Medication Sig Dispense Refill Last Dose   • acetaminophen (TYLENOL) 325 MG tablet Take 650 mg by mouth Every 6 (Six) Hours As Needed for Mild Pain .      • calcitriol (ROCALTROL) 0.25 MCG capsule Take 0.25 mcg by mouth Daily.    6/13/2021 at 0800   • cloNIDine (CATAPRES) 0.3 MG tablet Take 0.3 mg by mouth 3 (Three) Times a Day.   6/13/2021 at 0800   • famotidine (PEPCID) 20 MG tablet Take 1 tablet by mouth Daily for 90 days. 30 tablet 2 6/13/2021 at 0800   • folic acid (FOLVITE) 1 MG tablet Take 1 tablet by mouth Daily for 90 days. 30 tablet 2 6/13/2021 at 0800   • losartan (COZAAR) 50 MG tablet Take 1 tablet by mouth Daily for 90 days. 30 tablet 2 6/13/2021 at 0800   • predniSONE (DELTASONE) 20 MG tablet Ritchie 1 tableta por vía oral diariamente x 5 días, luego tome la mitad de la tableta diaria x 5 días. 10 tablet 0 6/13/2021 at 0800   • sodium bicarbonate 650 MG tablet Take 1 tablet by mouth 3 (Three) Times a Day. 90 tablet 1 6/13/2021 at 0800   • NIFEdipine XL (PROCARDIA XL) 90 MG 24 hr tablet Take 90 mg by mouth Daily.      • sevelamer (RENAGEL) 800 MG tablet Take 800 mg by mouth 3 (Three) Times a Day With Meals.          Current Meds:   Current Facility-Administered Medications   Medication Dose Route Frequency Provider Last Rate Last Admin   • acetaminophen (TYLENOL) tablet 650 mg  650 mg Oral Q4H PRN Lauren Crabtree MD        Or   • acetaminophen (TYLENOL) 160 MG/5ML solution 650 mg  650 mg Oral Q4H PRN Lauren Crabtree MD        Or   • acetaminophen (TYLENOL) suppository 650 mg  650 mg Rectal Q4H PRN Lauren Crabtree MD       • albumin human 25 % IV SOLN 12.5 g  12.5 g Intravenous PRN Hitesh Streeter MD       • calcitriol (ROCALTROL) capsule 0.25 mcg  0.25 mcg Oral Daily Lauren Crabtree MD   0.25 mcg at 06/14/21 0824   • calcium carbonate (TUMS) chewable tablet 500 mg (200 mg elemental)  2 tablet Oral BID PRN Lauren Crabtree MD       • cloNIDine (CATAPRES) tablet 0.3 mg  0.3 mg Oral TID Lauren Crabtree MD   0.3 mg at 06/14/21 0825   • famotidine (PEPCID) tablet 20 mg  20 mg Oral Daily Lauren Crabtree MD   20 mg at 06/14/21 0825   • folic acid (FOLVITE) tablet 1 mg  1 mg Oral Daily Lauren Crabtree MD   1 mg at 06/14/21 0825   •  losartan (COZAAR) tablet 50 mg  50 mg Oral Daily Lauren Crabtree MD   50 mg at 06/14/21 0824   • melatonin tablet 5 mg  5 mg Oral Nightly PRN Lauren Crabtree MD       • ondansetron (ZOFRAN) tablet 4 mg  4 mg Oral Q6H PRN Lauren Crabtree MD        Or   • ondansetron (ZOFRAN) injection 4 mg  4 mg Intravenous Q6H PRN Lauren Crabtree MD       • [START ON 6/15/2021] predniSONE (DELTASONE) tablet 10 mg  10 mg Oral Daily With Breakfast Lauren Crabtree MD       • sodium bicarbonate tablet 650 mg  650 mg Oral TID Lauren Crabtree MD   650 mg at 06/14/21 0824       Allergies:  Allergies   Allergen Reactions   • Heparin Other (See Comments)     Patient is thrombocytopenic.  Avoiding heparin at this time.       Social History:   Social History     Socioeconomic History   • Marital status: Single     Spouse name: Not on file   • Number of children: Not on file   • Years of education: Not on file   • Highest education level: Not on file   Tobacco Use   • Smoking status: Former Smoker   • Smokeless tobacco: Former User   Vaping Use   • Vaping Use: Never used   Substance and Sexual Activity   • Alcohol use: Never   • Drug use: Never   • Sexual activity: Defer        Family History:  Family History   Problem Relation Age of Onset   • Kidney disease Mother         Review of Systems: as per HPI, in addition:    General:      Denies weakness / fatigue,                       No fevers / chills                       no weight loss  HEENT:       no dysphagia / odynophagia  Neck:           normal range of motion, no swelling  Respiratory: no cough / congestion                      No shortness of air                       No wheezing  CV:              No chest pain                       No palpitations  Abdomen/GI: no nausea / vomiting                      No diarrhea / constipation                      No abdominal pain  :             no dysuria / urinary frequency                       No urgency, normal output  Endocrine:   no polyuria /  "polydipsia,                      No heat or cold intolerance  Skin:           no rashes or skin breakdown   Vascular:   No edema                     No claudication  Psych:        no depression/ anxiety  Neuro:        no focal weakness, no seizures  Musculoskeletal: no joint pain or deformities      Physical Exam:  Vitals:   Temp (24hrs), Av °F (36.7 °C), Min:97.8 °F (36.6 °C), Max:98.6 °F (37 °C)    /79 (BP Location: Left arm, Patient Position: Lying)   Pulse 53   Temp 98 °F (36.7 °C) (Oral)   Resp 18   Ht 162.6 cm (64\")   Wt 61.5 kg (135 lb 9.6 oz)   SpO2 98%   BMI 23.28 kg/m²   Intake/Output:     Intake/Output Summary (Last 24 hours) at 2021 1411  Last data filed at 2021 0836  Gross per 24 hour   Intake 1230 ml   Output 3000 ml   Net -1770 ml        Wt Readings from Last 1 Encounters:   21 1443 61.5 kg (135 lb 9.6 oz)       Exam:    General Appearance:  Awake, alert, oriented x3, no acute distress  Well-appearing   Head and Face:  Normocephalic, atraumatic, mucus membranes moist, oropharynx clear   Eyes:  No icterus, pupils equal round and reactive to light, extraocular movements intact    ENMT: Moist mucosa, tongue symmetric    Neck: Supple  no jugular venous distention  no thyromegaly   Pulmonary:  Respiratory effort: Normal  Auscultation of lungs: Clear bilaterally  No wheezes  No rhonchi  Good air movement, good expansion   Chest wall:  No tenderness or deformity   Cardiovascular:  Auscultation of the heart: Normal rhythm, no murmurs  No edema of bilateral lower extremities   Abdomen:  Abdomen: soft, non-tender, normal bowel sounds all four quadrants, no masses   Liver and spleen: no hepatosplenomegaly   Musculoskeletal: Digits and nails: normal  Normal range of motion  No joint swelling or gross deformities    Skin: Skin inspection: color normal, no visible rashes or lesions  Skin palpation: texture, turgor normal, no palpable lesions   Lymphatic:  no cervical lymphadenopathy "    Psychiatric: Judgement and insight: normal  Orientation to person place and time: normal  Mood and affect: normal       DATA:  Radiology and Labs:  The following labs independently reviewed by me, additional AM labs ordered      Labs:   Recent Results (from the past 24 hour(s))   COVID-19,CEPHEID,COR/DARRYN/PAD/RICK IN-HOUSE(OR EMERGENT/ADD-ON),NP SWAB IN TRANSPORT MEDIA 3-4 HR TAT, RT-PCR - Swab, Nasopharynx    Collection Time: 06/13/21  5:45 PM    Specimen: Nasopharynx; Swab   Result Value Ref Range    COVID19 Not Detected Not Detected - Ref. Range       Radiology:  Imaging Results (Last 24 Hours)     ** No results found for the last 24 hours. **               ASSESSMENT:   ESRD, does not have a current dialysis spot due to undocumented immigration status    Anemia, chronic disease    Essential hypertension    Thrombocytopenia (CMS/HCC)    Folate deficiency    GERD without esophagitis    Hemodialysis patient (CMS/[facility])        PLAN:   Patient received hemodialysis last night  Unfortunately he is not able to obtain an outpatient dialysis spot at this time and will need to return to the emergency room periodically for HD.  Okay to discharge home today.  Discussed with RN        Isaac Hays M.D  [facility]  Office phone number: [phone]  Answering service phone number: [phone]        6/14/2021        Dictation via Dragon dictation software

## 2021-06-17 ENCOUNTER — TELEPHONE (OUTPATIENT)
Dept: SOCIAL WORK | Facility: HOSPITAL | Age: 51
End: 2021-06-17

## 2021-06-17 NOTE — TELEPHONE ENCOUNTER
Call received from patient's niece, Sheba Miner ([phone]), on 6/16 regarding status of Emergency Medicaid application. SW provided local [facility] phone number to check status of application to niemiliano. Niece expressed concerns that patient has regarding inability to have regular HD. Patient expressed frustration and inquired about possibly needing to move to obtain services needed. SW advised that she would [facility] on status of [facility] and return call.     E-mail sent to Julissa Chiang, Gracie Noel, and Carmela Hale with [facility] team. Advised application remains pending and was informed interview with the [place] Medicaid office likely had not taken place yet. SW inquired about next steps for process to share with niemiliano. Per Julissa  - it can take up to three weeks to conduct interview. Required documentation to be submitted is a basic phot ID, some type of verification of [place] residency, written statement that pt currently doesn't have income, and any additional documentation the [place] Medicaid office may request specific to patient application. Usually requested during interview, with a list sent out regarding information needed by the [place] Medicaid officer afterwards. Process itself could take 45 days or longer for full completion.     SW spoke with director, Lori Campos, regarding patient's case due to recent addition of patient to the care plan list to help establish options available for HD within [facility]. Director inquired about a contract with [facility] and patient/family payment. Contacted Helena Ocampo (FAIZAN for [facility], phone: [phone]) regarding case to discuss available options. Three options explored: 1) SPA with [facility] and pt/family, 2) SPA with [facility] and [facility], 3) Mixed SPA with [facility] and pt/family as well as F. E-mail ([email or web address]) sent regarding request to [place]lean for options to be explored with [facility] team. CC'ed director in email.      Contacted niece today, 6/17, and left voicemail to update on information [facility]. Awaiting return call.     JAKOB Trejo    Phone: [phone]  Cell: [phone]  Fax: [phone]  [email or web address]

## 2021-06-18 ENCOUNTER — HOSPITAL ENCOUNTER (OUTPATIENT)
Facility: HOSPITAL | Age: 51
Setting detail: OBSERVATION
Discharge: HOME | End: 2021-06-19
Attending: EMERGENCY MEDICINE | Admitting: EMERGENCY MEDICINE

## 2021-06-18 DIAGNOSIS — N18.9 CHRONIC KIDNEY DISEASE, UNSPECIFIED CKD STAGE: Primary | ICD-10-CM

## 2021-06-18 LAB
ANION GAP SERPL CALCULATED.3IONS-SCNC: 21 MMOL/L (ref 5–15)
BUN SERPL-MCNC: 99 MG/DL (ref 6–20)
BUN/CREAT SERPL: 7.1 (ref 7–25)
CALCIUM SPEC-SCNC: 7.8 MG/DL (ref 8.6–10.5)
CHLORIDE SERPL-SCNC: 96 MMOL/L (ref 98–107)
CO2 SERPL-SCNC: 17 MMOL/L (ref 22–29)
CREAT SERPL-MCNC: 13.91 MG/DL (ref 0.76–1.27)
GFR SERPL CREATININE-BSD FRML MDRD: 4 ML/MIN/1.73
GFR SERPL CREATININE-BSD FRML MDRD: 5 ML/MIN/1.73
GLUCOSE SERPL-MCNC: 130 MG/DL (ref 65–99)
POTASSIUM SERPL-SCNC: 5.1 MMOL/L (ref 3.5–5.2)
SARS-COV-2 RNA PNL SPEC NAA+PROBE: NOT DETECTED
SODIUM SERPL-SCNC: 134 MMOL/L (ref 136–145)

## 2021-06-18 PROCEDURE — G0378 HOSPITAL OBSERVATION PER HR: HCPCS

## 2021-06-18 PROCEDURE — 80048 BASIC METABOLIC PNL TOTAL CA: CPT | Performed by: EMERGENCY MEDICINE

## 2021-06-18 PROCEDURE — C9803 HOPD COVID-19 SPEC COLLECT: HCPCS

## 2021-06-18 PROCEDURE — 99283 EMERGENCY DEPT VISIT LOW MDM: CPT

## 2021-06-18 PROCEDURE — U0003 INFECTIOUS AGENT DETECTION BY NUCLEIC ACID (DNA OR RNA); SEVERE ACUTE RESPIRATORY SYNDROME CORONAVIRUS 2 (SARS-COV-2) (CORONAVIRUS DISEASE [COVID-19]), AMPLIFIED PROBE TECHNIQUE, MAKING USE OF HIGH THROUGHPUT TECHNOLOGIES AS DESCRIBED BY CMS-2020-01-R: HCPCS | Performed by: EMERGENCY MEDICINE

## 2021-06-18 RX ORDER — SODIUM CHLORIDE 0.9 % (FLUSH) 0.9 %
10 SYRINGE (ML) INJECTION AS NEEDED
Status: DISCONTINUED | OUTPATIENT
Start: 2021-06-18 | End: 2021-06-19 | Stop reason: HOSPADM

## 2021-06-18 RX ORDER — CALCITRIOL 0.25 UG/1
0.25 CAPSULE, LIQUID FILLED ORAL DAILY
Status: DISCONTINUED | OUTPATIENT
Start: 2021-06-19 | End: 2021-06-19 | Stop reason: HOSPADM

## 2021-06-18 RX ORDER — ACETAMINOPHEN 650 MG/1
650 SUPPOSITORY RECTAL EVERY 4 HOURS PRN
Status: DISCONTINUED | OUTPATIENT
Start: 2021-06-18 | End: 2021-06-19 | Stop reason: HOSPADM

## 2021-06-18 RX ORDER — FAMOTIDINE 20 MG/1
10 TABLET, FILM COATED ORAL DAILY
Status: DISCONTINUED | OUTPATIENT
Start: 2021-06-19 | End: 2021-06-19 | Stop reason: HOSPADM

## 2021-06-18 RX ORDER — LOSARTAN POTASSIUM 25 MG/1
50 TABLET ORAL DAILY
Status: DISCONTINUED | OUTPATIENT
Start: 2021-06-19 | End: 2021-06-19 | Stop reason: HOSPADM

## 2021-06-18 RX ORDER — NIFEDIPINE 90 MG/1
90 TABLET, EXTENDED RELEASE ORAL DAILY PRN
Status: DISCONTINUED | OUTPATIENT
Start: 2021-06-18 | End: 2021-06-19 | Stop reason: HOSPADM

## 2021-06-18 RX ORDER — ONDANSETRON 2 MG/ML
4 INJECTION INTRAMUSCULAR; INTRAVENOUS EVERY 6 HOURS PRN
Status: DISCONTINUED | OUTPATIENT
Start: 2021-06-18 | End: 2021-06-19 | Stop reason: HOSPADM

## 2021-06-18 RX ORDER — SODIUM BICARBONATE 650 MG/1
650 TABLET ORAL 3 TIMES DAILY
Status: DISCONTINUED | OUTPATIENT
Start: 2021-06-18 | End: 2021-06-19 | Stop reason: HOSPADM

## 2021-06-18 RX ORDER — ONDANSETRON 4 MG/1
4 TABLET, FILM COATED ORAL EVERY 6 HOURS PRN
Status: DISCONTINUED | OUTPATIENT
Start: 2021-06-18 | End: 2021-06-19 | Stop reason: HOSPADM

## 2021-06-18 RX ORDER — SODIUM CHLORIDE 0.9 % (FLUSH) 0.9 %
10 SYRINGE (ML) INJECTION EVERY 12 HOURS SCHEDULED
Status: DISCONTINUED | OUTPATIENT
Start: 2021-06-18 | End: 2021-06-19 | Stop reason: HOSPADM

## 2021-06-18 RX ORDER — FOLIC ACID 1 MG/1
1 TABLET ORAL DAILY
Status: DISCONTINUED | OUTPATIENT
Start: 2021-06-19 | End: 2021-06-19 | Stop reason: HOSPADM

## 2021-06-18 RX ORDER — ACETAMINOPHEN 325 MG/1
650 TABLET ORAL EVERY 4 HOURS PRN
Status: DISCONTINUED | OUTPATIENT
Start: 2021-06-18 | End: 2021-06-19 | Stop reason: HOSPADM

## 2021-06-18 RX ORDER — CLONIDINE HYDROCHLORIDE 0.1 MG/1
0.3 TABLET ORAL 3 TIMES DAILY
Status: DISCONTINUED | OUTPATIENT
Start: 2021-06-18 | End: 2021-06-19 | Stop reason: HOSPADM

## 2021-06-18 RX ORDER — ACETAMINOPHEN 160 MG/5ML
650 SOLUTION ORAL EVERY 4 HOURS PRN
Status: DISCONTINUED | OUTPATIENT
Start: 2021-06-18 | End: 2021-06-19 | Stop reason: HOSPADM

## 2021-06-18 RX ADMIN — Medication 10 ML: at 20:08

## 2021-06-18 RX ADMIN — CLONIDINE HYDROCHLORIDE 0.3 MG: 0.1 TABLET ORAL at 17:58

## 2021-06-18 RX ADMIN — SODIUM BICARBONATE 650 MG: 650 TABLET ORAL at 18:19

## 2021-06-18 RX ADMIN — SODIUM BICARBONATE 650 MG: 650 TABLET ORAL at 20:08

## 2021-06-18 RX ADMIN — CLONIDINE HYDROCHLORIDE 0.3 MG: 0.1 TABLET ORAL at 20:08

## 2021-06-18 RX ADMIN — Medication 10 ML: at 20:09

## 2021-06-18 NOTE — PLAN OF CARE
Problem: Adult Inpatient Plan of Care  Goal: Plan of Care Review  Outcome: Ongoing, Progressing  Flowsheets (Taken 6/18/2021 1806)  Progress: improving  Plan of Care Reviewed With: patient   Goal Outcome Evaluation:  Plan of Care Reviewed With: patient     Patient admitted from ED with c/o SOB worse with exertion. Nephrology on board. Plan for dialysis today. Will continue to monitor.

## 2021-06-18 NOTE — ED NOTES
Pt states he needs to have dialysis done, reports last time he received it was on Saturday here. Pt states he is waiting to get insurance before he can receive dialysis regularly, but is having a hard time getting insurance due to not having a SSN. Pt is not c/o any symptoms at this time.      Carin Spencer, RN  06/18/21 9129

## 2021-06-18 NOTE — CASE MANAGEMENT/SOCIAL WORK
Social Work Assessment  [facility]     Patient Name: oJsh Klein  MRN: [id number]  Today's Date: 6/18/2021    Admit Date: 6/18/2021    Discharge Plan     Row Name 06/18/21 1752       Plan    Plan  Anticipate home with family. Emergency Medicaid application pending. OP HD unable to be arranged until active. See comments. Care plan patient (with case being worked on outpatient, see chart reivew for notes).    Plan Comments  Patient well known to . Patient was informed in prior admission to come to emergency room when need for HD arises. Patient has a pending Emergency Medicaid application, which status was checked on 6/17, remains pending. Patient anticipated to return home at d/c and come to ER at this time when needing HD. Patient is current care plan patient with goals to establish more permanent plan. Pt followed last admit and scheduled for outpatient f/u with Dr. Streeter. FAIZAN sent email to David GLORIA on 6/17 requesting contract options, which remains pending.     No physical contact with patient on this date.  Chart update.     JAKOB Trejo    Phone: [phone]  Cell: [phone]  Fax: [phone]  [email or web address]

## 2021-06-18 NOTE — H&P
[facility] H&P    Patient Name: Josh Klein  : 1970  MRN: [id number]  Primary Care Physician: Provider, No Known  Date of admission: 2021     Patient Care Team:  Provider, No Known as PCP - Devan Drew MD as Consulting Physician (Hematology and Oncology)          Subjective   History Present Illness     Chief Complaint:   Chief Complaint   Patient presents with   • needs diaylysis         Mr. Klein is a 50 y.o.  presents to [facility] complaining of missed dialysis.       50-year-old male presents to the ER with a chief complaint of missed dialysis since 2021.  The patient denies any shortness of breath, lower extremity edema, overt pain or discomfort.  He is concerned because he has not had his dialysis in 4 days.      Social history: The patient does not have insurance and has been unable to establish care with a dialysis center.  The patient's primary language is Welsh but declines phone or iPhone .      Review of Systems   All other systems reviewed and are negative.          Personal History     Past Medical History:   Past Medical History:   Diagnosis Date   • History of renal dialysis    • Hypertension    • Renal disorder        Surgical History:      Past Surgical History:   Procedure Laterality Date   • VASCULAR SURGERY      tunneled catheter           Family History: family history includes Kidney disease in his mother. Otherwise pertinent FHx was reviewed and unremarkable.     Social History:  reports that he has quit smoking. He has quit using smokeless tobacco. He reports that he does not drink alcohol and does not use drugs.      Medications:  Prior to Admission medications    Medication Sig Start Date End Date Taking? Authorizing Provider   acetaminophen (TYLENOL) 325 MG tablet Take 650 mg by mouth Every 6 (Six) Hours As Needed for Mild Pain .   Yes Provider, MD Ever   calcitriol (ROCALTROL) 0.25 MCG capsule Take 0.25 mcg  by mouth Daily.   Yes Ever Christy MD   cloNIDine (CATAPRES) 0.3 MG tablet Take 0.3 mg by mouth 3 (Three) Times a Day.   Yes Ever Christy MD   famotidine (PEPCID) 20 MG tablet Take 1 tablet by mouth Daily for 90 days. 6/9/21 9/7/21 Yes Benjy Nayak MD   folic acid (FOLVITE) 1 MG tablet Take 1 tablet by mouth Daily for 90 days. 6/10/21 9/8/21 Yes Benjy Nayak MD   losartan (COZAAR) 50 MG tablet Take 1 tablet by mouth Daily for 90 days. 6/9/21 9/7/21 Yes Benjy Nayak MD   NIFEdipine XL (PROCARDIA XL) 90 MG 24 hr tablet Take 90 mg by mouth Daily As Needed.   Yes ProviderEver MD   sodium bicarbonate 650 MG tablet Take 1 tablet by mouth 3 (Three) Times a Day. 5/24/21  Yes Fabiola Fernandes, LAISHA   predniSONE (DELTASONE) 20 MG tablet Timpson 1 tableta por vía oral diariamente x 5 días, luego tome la mitad de la tableta diaria x 5 vance. 6/9/21   Benjy Nayak MD   sevelamer (RENAGEL) 800 MG tablet Take 800 mg by mouth 3 (Three) Times a Day With Meals.    ProviderEver MD       Allergies:    Allergies   Allergen Reactions   • Heparin Other (See Comments)     Patient is thrombocytopenic.  Avoiding heparin at this time.       Objective   Objective     Vital Signs  Temp:  [98.5 °F (36.9 °C)] 98.5 °F (36.9 °C)  Heart Rate:  [67-75] 67  Resp:  [16] 16  BP: (161-195)/() 161/88  SpO2:  [99 %] 99 %  on   ;      Body mass index is 23.69 kg/m².    Physical Exam  Vitals and nursing note reviewed.   Constitutional:       Appearance: Normal appearance.   HENT:      Head: Normocephalic and atraumatic.      Right Ear: External ear normal.      Left Ear: External ear normal.      Nose: Nose normal. No congestion or rhinorrhea.      Mouth/Throat:      Mouth: Mucous membranes are moist.   Eyes:      General: No scleral icterus.        Right eye: No discharge.         Left eye: No discharge.      Extraocular Movements: Extraocular movements intact.      Conjunctiva/sclera: Conjunctivae  normal.      Pupils: Pupils are equal, round, and reactive to light.   Cardiovascular:      Rate and Rhythm: Normal rate and regular rhythm.      Pulses: Normal pulses.      Heart sounds: Normal heart sounds.   Pulmonary:      Effort: Pulmonary effort is normal.      Breath sounds: Normal breath sounds.   Abdominal:      General: Bowel sounds are normal.      Palpations: Abdomen is soft.   Musculoskeletal:         General: Normal range of motion.      Cervical back: Normal range of motion.      Right lower leg: No edema.      Left lower leg: No edema.   Skin:     General: Skin is warm and dry.      Capillary Refill: Capillary refill takes less than 2 seconds.   Neurological:      Mental Status: He is alert and oriented to person, place, and time. Mental status is at baseline.   Psychiatric:         Mood and Affect: Mood normal.         Behavior: Behavior normal.         Thought Content: Thought content normal.         Judgment: Judgment normal.           Results Review:  I have personally reviewed most recent cardiac tracings, lab results and radiology images and interpretations and agree with findings.        Results from last 7 days   Lab Units 06/18/21  1538   SODIUM mmol/L 134*   POTASSIUM mmol/L 5.1   CHLORIDE mmol/L 96*   CO2 mmol/L 17.0*   BUN mg/dL 99*   CREATININE mg/dL 13.91*   GLUCOSE mg/dL 130*   CALCIUM mg/dL 7.8*     Estimated Creatinine Clearance: 5.5 mL/min (A) (by C-G formula based on SCr of 13.91 mg/dL (H)).  Brief Urine Lab Results     None          Microbiology Results (last 10 days)     Procedure Component Value - Date/Time    COVID PRE-OP / PRE-PROCEDURE SCREENING ORDER (NO ISOLATION) - Swab, Nasopharynx [[id number]]  (Normal) Collected: 06/13/21 1745    Lab Status: Final result Specimen: Swab from Nasopharynx Updated: 06/13/21 1019    Narrative:      The following orders were created for panel order COVID PRE-OP / PRE-PROCEDURE SCREENING ORDER (NO ISOLATION) - Swab, Nasopharynx.  Procedure                                Abnormality         Status                     ---------                               -----------         ------                     COVID-19,CEPHEID,COR/DARRYN...[[id number]]  Normal              Final result                 Please view results for these tests on the individual orders.    COVID-19,CEPHEID,COR/DARRYN/PAD/RICK IN-HOUSE(OR EMERGENT/ADD-ON),NP SWAB IN TRANSPORT MEDIA 3-4 HR TAT, RT-PCR - Swab, Nasopharynx [[id number]]  (Normal) Collected: 06/13/21 1745    Lab Status: Final result Specimen: Swab from Nasopharynx Updated: 06/13/21 1839     COVID19 Not Detected    Narrative:      Fact sheet for providers: https://www.fda.gov/media/227871/download     Fact sheet for patients: https://www.fda.gov/media/946647/download  Fact sheet for providers: https://www.fda.gov/media/957782/download     Fact sheet for patients: https://www.fda.gov/media/367937/download          ECG/EMG Results (most recent)     None          Results for orders placed during the hospital encounter of 05/29/21    Duplex Hemodialysis Access CAR    Interpretation Summary  · Patent right radiocephalic AV fistula with an anastomotic stenosis and adequate volume flow. 4 mm cephalic vein tributary proximally.          No radiology results for the last 7 days      Estimated Creatinine Clearance: 5.5 mL/min (A) (by C-G formula based on SCr of 13.91 mg/dL (H)).    Assessment/Plan   Assessment/Plan       Active Hospital Problems    Diagnosis  POA   • Chronic renal failure [N18.9]  Yes      Resolved Hospital Problems   No resolved problems to display.     End-stage renal disease, chronic--will likely need dialysis: Nephrology consult; Continue Calcitrol; continue folic acid    Hypertension, chronic: Continue Catapres; continue losartan; continue nifedipine    GERD, chronic: Continue Pepcid      VTE Prophylaxis -   Mechanical Order History:     None      Pharmalogical Order History:     None          CODE STATUS:    There are no  questions and answers to display.       This patient has been examined wearing appropriate Personal Protective Equipment. 06/18/21      I discussed the patient's findings and my recommendations with patient.      Signature:Electronically signed by LAISHA Munoz, 06/18/21, 4:44 PM EDT.

## 2021-06-18 NOTE — ED PROVIDER NOTES
Subjective   Patient is a 50-year-old male who asked to be dialyzed.  Patient has no family physician or nephrologist.  He has been come to the ED for dialysis.  His last dialysis was on the 14th 4 days ago.  He denies complaints          Review of Systems  Negative for cough fever chest pain vomiting diarrhea or other complaint  Past Medical History:   Diagnosis Date   • History of renal dialysis    • Hypertension    • Renal disorder        Allergies   Allergen Reactions   • Heparin Other (See Comments)     Patient is thrombocytopenic.  Avoiding heparin at this time.       Past Surgical History:   Procedure Laterality Date   • VASCULAR SURGERY      tunneled catheter       Family History   Problem Relation Age of Onset   • Kidney disease Mother        Social History     Socioeconomic History   • Marital status: Single     Spouse name: Not on file   • Number of children: Not on file   • Years of education: Not on file   • Highest education level: Not on file   Tobacco Use   • Smoking status: Former Smoker   • Smokeless tobacco: Former User   Vaping Use   • Vaping Use: Never used   Substance and Sexual Activity   • Alcohol use: Never   • Drug use: Never   • Sexual activity: Defer           Objective   Physical Exam  Lungs are clear.  Heart has regular rhythm without murmur gallop or chest nontender.  Abdomen soft nontender.  Extremity exam unremarkable.  Procedures           ED Course                                           MDM  Number of Diagnoses or Management Options  Diagnosis management comments: Patient will be placed in ED observation for dialysis.  He has no complaints and a benign exam at this time.    Risk of Complications, Morbidity, and/or Mortality  Presenting problems: moderate  Diagnostic procedures: moderate  Management options: moderate    Patient Progress  Patient progress: stable      Final diagnoses:   Chronic kidney disease, unspecified CKD stage       ED Disposition  ED Disposition     ED  Disposition Condition Comment    Decision to Admit            No follow-up provider specified.       Medication List      No changes were made to your prescriptions during this visit.          Pablo Shelton MD  06/18/21 0203

## 2021-06-19 VITALS
OXYGEN SATURATION: 99 % | TEMPERATURE: 98.4 F | DIASTOLIC BLOOD PRESSURE: 78 MMHG | HEART RATE: 55 BPM | RESPIRATION RATE: 16 BRPM | BODY MASS INDEX: 22.28 KG/M2 | WEIGHT: 130.51 LBS | SYSTOLIC BLOOD PRESSURE: 138 MMHG | HEIGHT: 64 IN

## 2021-06-19 LAB
ANION GAP SERPL CALCULATED.3IONS-SCNC: 13 MMOL/L (ref 5–15)
BUN SERPL-MCNC: 38 MG/DL (ref 6–20)
BUN/CREAT SERPL: 5.2 (ref 7–25)
CALCIUM SPEC-SCNC: 8.3 MG/DL (ref 8.6–10.5)
CHLORIDE SERPL-SCNC: 97 MMOL/L (ref 98–107)
CO2 SERPL-SCNC: 26 MMOL/L (ref 22–29)
CREAT SERPL-MCNC: 7.33 MG/DL (ref 0.76–1.27)
DEPRECATED RDW RBC AUTO: 46.4 FL (ref 37–54)
ERYTHROCYTE [DISTWIDTH] IN BLOOD BY AUTOMATED COUNT: 14.3 % (ref 12.3–15.4)
GFR SERPL CREATININE-BSD FRML MDRD: 10 ML/MIN/1.73
GFR SERPL CREATININE-BSD FRML MDRD: 8 ML/MIN/1.73
GLUCOSE SERPL-MCNC: 110 MG/DL (ref 65–99)
HCT VFR BLD AUTO: 28.1 % (ref 37.5–51)
HGB BLD-MCNC: 9.4 G/DL (ref 13–17.7)
MCH RBC QN AUTO: 31.4 PG (ref 26.6–33)
MCHC RBC AUTO-ENTMCNC: 33.4 G/DL (ref 31.5–35.7)
MCV RBC AUTO: 93.9 FL (ref 79–97)
PLATELET # BLD AUTO: 265 10*3/MM3 (ref 140–450)
PMV BLD AUTO: 8.9 FL (ref 6–12)
POTASSIUM SERPL-SCNC: 3.6 MMOL/L (ref 3.5–5.2)
RBC # BLD AUTO: 2.99 10*6/MM3 (ref 4.14–5.8)
SODIUM SERPL-SCNC: 136 MMOL/L (ref 136–145)
WBC # BLD AUTO: 10.2 10*3/MM3 (ref 3.4–10.8)

## 2021-06-19 PROCEDURE — 80048 BASIC METABOLIC PNL TOTAL CA: CPT | Performed by: INTERNAL MEDICINE

## 2021-06-19 PROCEDURE — 85027 COMPLETE CBC AUTOMATED: CPT | Performed by: EMERGENCY MEDICINE

## 2021-06-19 PROCEDURE — G0378 HOSPITAL OBSERVATION PER HR: HCPCS

## 2021-06-19 PROCEDURE — G0257 UNSCHED DIALYSIS ESRD PT HOS: HCPCS

## 2021-06-19 RX ORDER — ALBUMIN (HUMAN) 12.5 G/50ML
12.5 SOLUTION INTRAVENOUS AS NEEDED
Status: DISCONTINUED | OUTPATIENT
Start: 2021-06-19 | End: 2021-06-19 | Stop reason: HOSPADM

## 2021-06-19 RX ADMIN — FAMOTIDINE 10 MG: 20 TABLET, FILM COATED ORAL at 08:24

## 2021-06-19 RX ADMIN — Medication 10 ML: at 08:25

## 2021-06-19 RX ADMIN — CALCITRIOL CAPSULES 0.25 MCG 0.25 MCG: 0.25 CAPSULE ORAL at 08:25

## 2021-06-19 RX ADMIN — LOSARTAN POTASSIUM 50 MG: 25 TABLET, FILM COATED ORAL at 08:24

## 2021-06-19 RX ADMIN — CLONIDINE HYDROCHLORIDE 0.3 MG: 0.1 TABLET ORAL at 08:24

## 2021-06-19 RX ADMIN — SODIUM BICARBONATE 650 MG: 650 TABLET ORAL at 08:24

## 2021-06-19 RX ADMIN — FOLIC ACID 1 MG: 1 TABLET ORAL at 08:24

## 2021-06-19 NOTE — DISCHARGE SUMMARY
[facility]  DISCHARGE SUMMARY        Prepared For PCP:  Provider, No Known    Patient Name: Josh Klein  : 1970  MRN: [id number]      Date of Admission:   2021    Date of Discharge:  2021    Length of stay:  LOS: 0 days     Hospital Course     Presenting Problem:   Chronic renal failure [N18.9]  Chronic kidney disease, unspecified CKD stage [N18.9]      Active Hospital Problems    Diagnosis  POA   • Chronic renal failure [N18.9]  Yes      Resolved Hospital Problems   No resolved problems to display.           Hospital Course:  Josh Klein is a 50 y.o. male who has known end-stage renal disease diagnosed in early  presents to the ER with a chief complaint of not having dialysis since 2021.  The patient does not have insurance and  at this facility and others have tried to assist the patient and have been unsuccessful in securing outpatient dialysis.  The patient was dialyzed with improvement of his laboratory results and a report that he feels well this morning.   noted the patient's Medicaid is still pending and attempted again to secure a dialysis contract with [facility] but that has not been established.  The patient will return to an emergency facility for future dialysis of no more than 5 days as there are no other current options available.    End-stage renal disease, chronic-dialysis complete per nephrology recommendations; continue folic acid   -patient is prescribed Renagel but states he cannot afford it, this medication will not be discontinued from his records    Anemia, hemoglobin 9.4 after dialysis and likely secondary to ESRD: no further evaluation at this time   -Patient denies any melena or hematochezia     Hypertension, chronic: Continue Catapres; continue losartan; continue nifedipine     GERD, chronic: Continue Pepcid      Recommendation for Outpatient Providers:             Reasons For Change In Medications and Indications  for New Medications:        Day of Discharge     HPI: 50-year-old male presents to the ER with a chief complaint of missed dialysis since 6/14/2021.  The patient denies any shortness of breath, lower extremity edema, overt pain or discomfort.  He is concerned because he has not had his dialysis in 4 days.       Social history: The patient does not have insurance and has been unable to establish care with a dialysis center.  The patient's primary language is Irish but declines phone or iPhone .     Vital Signs:   Temp:  [97.4 °F (36.3 °C)-98.5 °F (36.9 °C)] 98.4 °F (36.9 °C)  Heart Rate:  [55-75] 55  Resp:  [16-22] 16  BP: (138-195)/() 138/78     ROS:  Review of Systems   Gastrointestinal: Negative for hematochezia and melena.   All other systems reviewed and are negative.      Physical Exam:  Physical Exam  Vitals and nursing note reviewed.   Constitutional:       General: He is not in acute distress.     Appearance: Normal appearance. He is normal weight. He is not ill-appearing or toxic-appearing.   HENT:      Head: Normocephalic and atraumatic.      Right Ear: External ear normal.      Left Ear: External ear normal.      Nose: Nose normal. No congestion or rhinorrhea.      Mouth/Throat:      Mouth: Mucous membranes are moist.   Eyes:      General: No scleral icterus.        Right eye: No discharge.         Left eye: No discharge.      Extraocular Movements: Extraocular movements intact.      Conjunctiva/sclera: Conjunctivae normal.      Pupils: Pupils are equal, round, and reactive to light.   Cardiovascular:      Rate and Rhythm: Normal rate and regular rhythm.      Pulses: Normal pulses.      Heart sounds: Normal heart sounds. No murmur heard.     Pulmonary:      Effort: Pulmonary effort is normal.      Breath sounds: Normal breath sounds.   Abdominal:      General: Bowel sounds are normal.      Palpations: Abdomen is soft.   Musculoskeletal:         General: Normal range of motion.       Cervical back: Normal range of motion and neck supple.      Right lower leg: No edema.      Left lower leg: No edema.   Skin:     General: Skin is warm and dry.      Capillary Refill: Capillary refill takes less than 2 seconds.      Coloration: Skin is not jaundiced or pale.   Neurological:      General: No focal deficit present.      Mental Status: He is oriented to person, place, and time.   Psychiatric:         Mood and Affect: Mood normal.         Behavior: Behavior normal.         Thought Content: Thought content normal.         Judgment: Judgment normal.         Pertinent  and/or Most Recent Results     Results from last 7 days   Lab Units 06/19/21  0653 06/19/21  0010 06/18/21  1538   WBC 10*3/mm3  --  10.20  --    HEMOGLOBIN g/dL  --  9.4*  --    HEMATOCRIT %  --  28.1*  --    PLATELETS 10*3/mm3  --  265  --    SODIUM mmol/L 136  --  134*   POTASSIUM mmol/L 3.6  --  5.1   CHLORIDE mmol/L 97*  --  96*   CO2 mmol/L 26.0  --  17.0*   BUN mg/dL 38*  --  99*   CREATININE mg/dL 7.33*  --  13.91*   GLUCOSE mg/dL 110*  --  130*   CALCIUM mg/dL 8.3*  --  7.8*           Invalid input(s): PROT, LABALBU        Invalid input(s): TG, LDLCALC, LDLREALC        Brief Urine Lab Results     None          Microbiology Results Abnormal     Procedure Component Value - Date/Time    COVID PRE-OP / PRE-PROCEDURE SCREENING ORDER (NO ISOLATION) - Swab, Nasopharynx [[id number]]  (Normal) Collected: 06/18/21 1623    Lab Status: Final result Specimen: Swab from Nasopharynx Updated: 06/18/21 1720    Narrative:      The following orders were created for panel order COVID PRE-OP / PRE-PROCEDURE SCREENING ORDER (NO ISOLATION) - Swab, Nasopharynx.  Procedure                               Abnormality         Status                     ---------                               -----------         ------                     COVID-19,CEPHEID,COR/DARRYN...[[id number]]  Normal              Final result                 Please view results for these tests  on the individual orders.    COVID-19,CEPHEID,COR/DARRYN/PAD/RICK IN-HOUSE(OR EMERGENT/ADD-ON),NP SWAB IN TRANSPORT MEDIA 3-4 HR TAT, RT-PCR - Swab, Nasopharynx [[id number]]  (Normal) Collected: 06/18/21 1623    Lab Status: Final result Specimen: Swab from Nasopharynx Updated: 06/18/21 1720     COVID19 Not Detected    Narrative:      Fact sheet for providers: https://www.fda.gov/media/670687/download     Fact sheet for patients: https://www.fda.gov/media/749739/download  Fact sheet for providers: https://www.fda.gov/media/138436/download     Fact sheet for patients: https://www.fda.gov/media/556681/download          XR Chest 1 View    Result Date: 5/29/2021  Impression: No acute cardiopulmonary abnormality or significant change.  Electronically Signed By-Iesha Winter MD On:5/29/2021 3:19 PM This report was finalized on [id number] by  Iesha Winter MD.    XR Chest 1 View    Result Date: 5/23/2021  Impression: No active disease.  Electronically Signed By-Dank Rodriguez MD On:5/23/2021 1:36 PM This report was finalized on [id number] by  Dank Rodriguez MD.      Results for orders placed during the hospital encounter of 05/29/21    Duplex Hemodialysis Access CAR    Interpretation Summary  · Patent right radiocephalic AV fistula with an anastomotic stenosis and adequate volume flow. 4 mm cephalic vein tributary proximally.      Results for orders placed during the hospital encounter of 05/29/21    Duplex Hemodialysis Access CAR    Interpretation Summary  · Patent right radiocephalic AV fistula with an anastomotic stenosis and adequate volume flow. 4 mm cephalic vein tributary proximally.                  Test Results Pending at Discharge        Procedures Performed           Consults:   Consults     Date and Time Order Name Status Description    6/18/2021  4:36 PM Inpatient Nephrology Consult      6/13/2021  4:04 PM Hospitalist (on-call MD unless specified) Completed     6/13/2021  3:17 PM Nephrology (on -call MD unless  specified) Completed     6/3/2021 12:48 PM Inpatient Vascular Surgery Consult Completed     5/31/2021 11:43 AM Hematology & Oncology Inpatient Consult Completed     5/30/2021  4:24 PM Inpatient Hospitalist Consult Completed     5/23/2021  4:12 PM Nephrology (on -call MD unless specified) Completed             Discharge Details        Discharge Medications      ASK your doctor about these medications      Instructions Start Date   acetaminophen 325 MG tablet  Commonly known as: TYLENOL   650 mg, Oral, Every 6 Hours PRN      calcitriol 0.25 MCG capsule  Commonly known as: ROCALTROL   0.25 mcg, Oral, Daily      cloNIDine 0.3 MG tablet  Commonly known as: CATAPRES   0.3 mg, Oral, 3 Times Daily      famotidine 20 MG tablet  Commonly known as: PEPCID   20 mg, Oral, Daily      folic acid 1 MG tablet  Commonly known as: FOLVITE   1 mg, Oral, Daily      losartan 50 MG tablet  Commonly known as: COZAAR   50 mg, Oral, Daily      NIFEdipine XL 90 MG 24 hr tablet  Commonly known as: PROCARDIA XL   90 mg, Oral, Daily PRN      predniSONE 20 MG tablet  Commonly known as: DELTASONE   [place] 1 tableta por vía oral diariamente x 5 días, luego tome la mitad de la tableta diaria x 5 días.      sevelamer 800 MG tablet  Commonly known as: RENAGEL   800 mg, Oral, 3 Times Daily With Meals      sodium bicarbonate 650 MG tablet   650 mg, Oral, 3 Times Daily             Allergies   Allergen Reactions   • Heparin Other (See Comments)     Patient is thrombocytopenic.  Avoiding heparin at this time.         Discharge Disposition:  Home       Diet:  Hospital:  Diet Order   Procedures   • Diet Renal; 2gm K+         Discharge Activity: as tolerated         CODE STATUS:    Code Status and Medical Interventions:   Ordered at: 06/18/21 1638     Code Status:    CPR     Medical Interventions (Level of Support Prior to Arrest):    Full         Follow-up Appointments  No future appointments.          Condition on Discharge:      Stable      This patient has  been examined wearing appropriate Personal Protective Equipment. 06/19/21      Electronically signed by LAISHA Munoz, 06/19/21, 8:59 AM EDT.      Time: I spent  60  minutes on this discharge activity which included face-to-face encounter with the patient/reviewing the data in the system/coordination of the care with the nursing staff as well as consultants/documentation/entering orders.

## 2021-06-19 NOTE — NURSING NOTE
Pt had a 4hr HD tx with a net fluid removal of 3L with no complications. He is alert and hemodynamically stable post HD.

## 2021-06-19 NOTE — PLAN OF CARE
Problem: Adult Inpatient Plan of Care  Goal: Plan of Care Review  Outcome: Ongoing, Progressing  Flowsheets (Taken 6/19/2021 0910)  Progress: improving  Plan of Care Reviewed With: patient  Outcome Summary: Pt is able to be discharged.   Goal Outcome Evaluation:  Plan of Care Reviewed With: patient        Progress: improving  Outcome Summary: Pt is able to be discharged.

## 2021-06-19 NOTE — PLAN OF CARE
Problem: Adult Inpatient Plan of Care  Goal: Plan of Care Review  Outcome: Ongoing, Progressing  Flowsheets (Taken 6/19/2021 0131)  Progress: improving  Plan of Care Reviewed With: patient  Outcome Summary: Pt off floor to dialysis  Goal: Patient-Specific Goal (Individualized)  Outcome: Ongoing, Progressing  Goal: Absence of Hospital-Acquired Illness or Injury  Outcome: Ongoing, Progressing  Intervention: Identify and Manage Fall Risk  Recent Flowsheet Documentation  Taken 6/18/2021 2300 by Danika Rhoades RN  Safety Promotion/Fall Prevention:   assistive device/personal items within reach   clutter free environment maintained   lighting adjusted   nonskid shoes/slippers when out of bed   room organization consistent   safety round/check completed  Taken 6/18/2021 1922 by Danika Rhoades RN  Safety Promotion/Fall Prevention:   assistive device/personal items within reach   clutter free environment maintained   lighting adjusted   nonskid shoes/slippers when out of bed   room organization consistent   safety round/check completed  Intervention: Prevent Skin Injury  Recent Flowsheet Documentation  Taken 6/18/2021 2300 by Danika Rhoades RN  Body Position: position changed independently  Taken 6/18/2021 1922 by Danika Rhoades RN  Body Position: position changed independently  Intervention: Prevent Infection  Recent Flowsheet Documentation  Taken 6/18/2021 2300 by Danika Rhoades RN  Infection Prevention:   environmental surveillance performed   equipment surfaces disinfected   hand hygiene promoted   personal protective equipment utilized   rest/sleep promoted   single patient room provided   visitors restricted/screened  Taken 6/18/2021 1922 by Danika Rhoades RN  Infection Prevention:   environmental surveillance performed   equipment surfaces disinfected   hand hygiene promoted   personal protective equipment utilized   rest/sleep promoted   single patient room provided   visitors restricted/screened  Goal: Optimal Comfort  and Wellbeing  Outcome: Ongoing, Progressing  Intervention: Provide Person-Centered Care  Recent Flowsheet Documentation  Taken 6/18/2021 1922 by Danika Rhoades RN  Trust Relationship/Rapport:   care explained   choices provided   emotional support provided   empathic listening provided   questions answered   questions encouraged   reassurance provided   thoughts/feelings acknowledged  Goal: Readiness for Transition of Care  Outcome: Ongoing, Progressing     Problem: Adjustment to Illness (Chronic Kidney Disease)  Goal: Optimal Coping with Chronic Illness  Outcome: Ongoing, Progressing  Intervention: Support and Optimize Psychosocial Response to Chronic Illness  Recent Flowsheet Documentation  Taken 6/18/2021 1922 by Danika Rhoades RN  Supportive Measures: active listening utilized  Family/Support System Care:   self-care encouraged   support provided     Problem: Electrolyte Imbalance (Chronic Kidney Disease)  Goal: Electrolyte Balance  Outcome: Ongoing, Progressing     Problem: Fluid Volume Excess (Chronic Kidney Disease)  Goal: Fluid Balance  Outcome: Ongoing, Progressing     Problem: Functional Decline (Chronic Kidney Disease)  Goal: Optimal Functional Ability  Outcome: Ongoing, Progressing  Intervention: Optimize Functional Ability  Recent Flowsheet Documentation  Taken 6/18/2021 2300 by Danika Rhoades RN  Activity Management: up ad brianna  Taken 6/18/2021 1922 by Danika Rhoades RN  Activity Management: up in chair     Problem: Hematologic Alteration (Chronic Kidney Disease)  Goal: Absence of Anemia Signs and Symptoms  Outcome: Ongoing, Progressing     Problem: Oral Intake Inadequate (Chronic Kidney Disease)  Goal: Optimal Oral Intake  Outcome: Ongoing, Progressing     Problem: Renal Function Impairment (Chronic Kidney Disease)  Goal: Laboratory Values and Blood Pressure Within Desired Range  Outcome: Ongoing, Progressing  Intervention: Monitor and Support Renal Function  Recent Flowsheet Documentation  Taken  6/18/2021 2300 by Danika Rhoades, RN  Medication Review/Management: medications reviewed  Taken 6/18/2021 1922 by Danika Rhoades, RN  Medication Review/Management: medications reviewed     Problem: Activity Intolerance  Goal: Enhanced Capacity and Energy  Outcome: Ongoing, Progressing  Intervention: Optimize Activity Tolerance  Recent Flowsheet Documentation  Taken 6/18/2021 2300 by Danika Rhoades, RN  Activity Management: up ad brianna  Taken 6/18/2021 1922 by Danika Rhoades, RN  Activity Management: up in chair   Goal Outcome Evaluation:  Plan of Care Reviewed With: patient        Progress: improving  Outcome Summary: Pt off floor to dialysis

## 2021-06-21 NOTE — CASE MANAGEMENT/SOCIAL WORK
Case Management Discharge Note      Final Note: home         Selected Continued Care - Discharged on 6/19/2021 Admission date: 6/18/2021 - Discharge disposition: Home or Self Care                 Final Discharge Disposition Code: 01 - home or self-care

## 2021-06-23 ENCOUNTER — HOSPITAL ENCOUNTER (OUTPATIENT)
Facility: HOSPITAL | Age: 51
Setting detail: OBSERVATION
Discharge: HOME | End: 2021-06-24
Attending: EMERGENCY MEDICINE | Admitting: EMERGENCY MEDICINE

## 2021-06-23 DIAGNOSIS — N18.6 CHRONIC KIDNEY DISEASE ON CHRONIC DIALYSIS (HCC): Primary | ICD-10-CM

## 2021-06-23 DIAGNOSIS — Z99.2 CHRONIC KIDNEY DISEASE ON CHRONIC DIALYSIS (HCC): Primary | ICD-10-CM

## 2021-06-23 LAB
ANION GAP SERPL CALCULATED.3IONS-SCNC: 20 MMOL/L (ref 5–15)
BASOPHILS # BLD AUTO: 0 10*3/MM3 (ref 0–0.2)
BASOPHILS NFR BLD AUTO: 0.7 % (ref 0–1.5)
BUN SERPL-MCNC: 96 MG/DL (ref 6–20)
BUN/CREAT SERPL: 6.9 (ref 7–25)
CALCIUM SPEC-SCNC: 7.8 MG/DL (ref 8.6–10.5)
CHLORIDE SERPL-SCNC: 99 MMOL/L (ref 98–107)
CO2 SERPL-SCNC: 19 MMOL/L (ref 22–29)
CREAT SERPL-MCNC: 13.85 MG/DL (ref 0.76–1.27)
DEPRECATED RDW RBC AUTO: 45.9 FL (ref 37–54)
EOSINOPHIL # BLD AUTO: 0 10*3/MM3 (ref 0–0.4)
EOSINOPHIL NFR BLD AUTO: 0.2 % (ref 0.3–6.2)
ERYTHROCYTE [DISTWIDTH] IN BLOOD BY AUTOMATED COUNT: 14.1 % (ref 12.3–15.4)
GFR SERPL CREATININE-BSD FRML MDRD: 4 ML/MIN/1.73
GFR SERPL CREATININE-BSD FRML MDRD: 5 ML/MIN/1.73
GLUCOSE SERPL-MCNC: 109 MG/DL (ref 65–99)
HBV SURFACE AG SERPL QL IA: NORMAL
HCT VFR BLD AUTO: 28.4 % (ref 37.5–51)
HGB BLD-MCNC: 9.4 G/DL (ref 13–17.7)
LYMPHOCYTES # BLD AUTO: 0.5 10*3/MM3 (ref 0.7–3.1)
LYMPHOCYTES NFR BLD AUTO: 7.7 % (ref 19.6–45.3)
MCH RBC QN AUTO: 30.9 PG (ref 26.6–33)
MCHC RBC AUTO-ENTMCNC: 33.2 G/DL (ref 31.5–35.7)
MCV RBC AUTO: 93.2 FL (ref 79–97)
MONOCYTES # BLD AUTO: 0.2 10*3/MM3 (ref 0.1–0.9)
MONOCYTES NFR BLD AUTO: 3.1 % (ref 5–12)
NEUTROPHILS NFR BLD AUTO: 6 10*3/MM3 (ref 1.7–7)
NEUTROPHILS NFR BLD AUTO: 88.3 % (ref 42.7–76)
NRBC BLD AUTO-RTO: 0 /100 WBC (ref 0–0.2)
PLATELET # BLD AUTO: 204 10*3/MM3 (ref 140–450)
PMV BLD AUTO: 8.9 FL (ref 6–12)
POTASSIUM SERPL-SCNC: 5.8 MMOL/L (ref 3.5–5.2)
RBC # BLD AUTO: 3.04 10*6/MM3 (ref 4.14–5.8)
SODIUM SERPL-SCNC: 138 MMOL/L (ref 136–145)
WBC # BLD AUTO: 6.8 10*3/MM3 (ref 3.4–10.8)

## 2021-06-23 PROCEDURE — 99283 EMERGENCY DEPT VISIT LOW MDM: CPT

## 2021-06-23 PROCEDURE — G0378 HOSPITAL OBSERVATION PER HR: HCPCS

## 2021-06-23 PROCEDURE — 85025 COMPLETE CBC W/AUTO DIFF WBC: CPT | Performed by: PHYSICIAN ASSISTANT

## 2021-06-23 PROCEDURE — 80048 BASIC METABOLIC PNL TOTAL CA: CPT | Performed by: PHYSICIAN ASSISTANT

## 2021-06-23 PROCEDURE — 87340 HEPATITIS B SURFACE AG IA: CPT | Performed by: INTERNAL MEDICINE

## 2021-06-23 PROCEDURE — G0257 UNSCHED DIALYSIS ESRD PT HOS: HCPCS

## 2021-06-23 RX ORDER — ACETAMINOPHEN 160 MG/5ML
650 SOLUTION ORAL EVERY 4 HOURS PRN
Status: DISCONTINUED | OUTPATIENT
Start: 2021-06-23 | End: 2021-06-24 | Stop reason: HOSPADM

## 2021-06-23 RX ORDER — ACETAMINOPHEN 650 MG/1
650 SUPPOSITORY RECTAL EVERY 4 HOURS PRN
Status: DISCONTINUED | OUTPATIENT
Start: 2021-06-23 | End: 2021-06-24 | Stop reason: HOSPADM

## 2021-06-23 RX ORDER — ALBUMIN (HUMAN) 12.5 G/50ML
12.5 SOLUTION INTRAVENOUS AS NEEDED
Status: CANCELLED | OUTPATIENT
Start: 2021-06-23 | End: 2021-06-24

## 2021-06-23 RX ORDER — ACETAMINOPHEN 325 MG/1
650 TABLET ORAL EVERY 4 HOURS PRN
Status: DISCONTINUED | OUTPATIENT
Start: 2021-06-23 | End: 2021-06-24 | Stop reason: HOSPADM

## 2021-06-23 RX ORDER — SODIUM CHLORIDE 0.9 % (FLUSH) 0.9 %
10 SYRINGE (ML) INJECTION EVERY 12 HOURS SCHEDULED
Status: DISCONTINUED | OUTPATIENT
Start: 2021-06-23 | End: 2021-06-24 | Stop reason: HOSPADM

## 2021-06-23 RX ORDER — ONDANSETRON 2 MG/ML
4 INJECTION INTRAMUSCULAR; INTRAVENOUS EVERY 6 HOURS PRN
Status: DISCONTINUED | OUTPATIENT
Start: 2021-06-23 | End: 2021-06-24 | Stop reason: HOSPADM

## 2021-06-23 RX ORDER — SODIUM CHLORIDE 0.9 % (FLUSH) 0.9 %
10 SYRINGE (ML) INJECTION AS NEEDED
Status: DISCONTINUED | OUTPATIENT
Start: 2021-06-23 | End: 2021-06-24 | Stop reason: HOSPADM

## 2021-06-23 RX ORDER — ONDANSETRON 4 MG/1
4 TABLET, FILM COATED ORAL EVERY 6 HOURS PRN
Status: DISCONTINUED | OUTPATIENT
Start: 2021-06-23 | End: 2021-06-24 | Stop reason: HOSPADM

## 2021-06-23 NOTE — H&P
[facility] H&P    Patient Name: Josh Klein  : 1970  MRN: [id number]  Primary Care Physician: Provider, No Known  Date of admission: 2021     Patient Care Team:  Provider, No Known as PCP - Devan Drew MD as Consulting Physician (Hematology and Oncology)          Subjective   History Present Illness     Chief Complaint:   Chief Complaint   Patient presents with   • Other          Mr. Klein is a 50 y.o.  presents to [facility] complaining of needing dialysis         50-year-old male presents to the ER with a chief complaint of needing dialysis.  He was diagnosed with end-stage renal disease in early  and started dialysis.  Secondary to lack of insurance the patient has been unable to secure outpatient dialysis accommodations.      Review of records: On most recent hospitalization 2021 the patient was seen by  with note indicating the patient's Medicaid is still pending and attempted again to secure a dialysis contract with [facility] but that has not been established.           Review of Systems   Constitutional:        Needs dialysis   Gastrointestinal: Negative for hemorrhoids and melena.   All other systems reviewed and are negative.          Personal History     Past Medical History:   Past Medical History:   Diagnosis Date   • History of renal dialysis    • Hypertension    • Renal disorder        Surgical History:      Past Surgical History:   Procedure Laterality Date   • VASCULAR SURGERY      tunneled catheter           Family History: family history includes Kidney disease in his mother. Otherwise pertinent FHx was reviewed and unremarkable.     Social History:  reports that he has quit smoking. He has quit using smokeless tobacco. He reports that he does not drink alcohol and does not use drugs.      Medications:  Prior to Admission medications    Medication Sig Start Date End Date Taking? Authorizing Provider   acetaminophen  (TYLENOL) 325 MG tablet Take 650 mg by mouth Every 6 (Six) Hours As Needed for Mild Pain .   Yes Ever Christy MD   calcitriol (ROCALTROL) 0.25 MCG capsule Take 0.25 mcg by mouth Daily.   Yes Ever Christy MD   cloNIDine (CATAPRES) 0.3 MG tablet Take 0.3 mg by mouth 3 (Three) Times a Day.   Yes Ever Christy MD   famotidine (PEPCID) 20 MG tablet Take 1 tablet by mouth Daily for 90 days. 6/9/21 9/7/21 Yes Benjy Nayak MD   folic acid (FOLVITE) 1 MG tablet Take 1 tablet by mouth Daily for 90 days. 6/10/21 9/8/21 Yes Benjy Nayak MD   losartan (COZAAR) 50 MG tablet Take 1 tablet by mouth Daily for 90 days. 6/9/21 9/7/21 Yes Benjy Nayak MD   NIFEdipine XL (PROCARDIA XL) 90 MG 24 hr tablet Take 45 mg by mouth Daily As Needed. Patient states moving to half tab prn   Yes Ever Christy MD   predniSONE (DELTASONE) 20 MG tablet [place] 1 tableta por vía oral diariamente x 5 días, luego tome la mitad de la tableta diaria x 5 vance. 6/9/21  Yes Benjy Nayak MD   sodium bicarbonate 650 MG tablet Take 1 tablet by mouth 3 (Three) Times a Day. 5/24/21  Yes Fabiola Fernandes APRN   sevelamer (RENAGEL) 800 MG tablet Take 800 mg by mouth 3 (Three) Times a Day With Meals.  6/23/21  Ever Christy MD       Allergies:    Allergies   Allergen Reactions   • Heparin Other (See Comments)     Patient is thrombocytopenic.  Avoiding heparin at this time.       Objective   Objective     Vital Signs  Temp:  [97.7 °F (36.5 °C)] 97.7 °F (36.5 °C)  Heart Rate:  [53-68] 53  Resp:  [16-18] 16  BP: (119-149)/(68-81) 119/68  SpO2:  [99 %] 99 %  on   ;   Device (Oxygen Therapy): room air  Body mass index is 23.61 kg/m².    Physical Exam  Vitals and nursing note reviewed.   Constitutional:       Appearance: Normal appearance.   HENT:      Head: Normocephalic and atraumatic.      Right Ear: External ear normal.      Left Ear: External ear normal.      Nose: Nose normal. No congestion or rhinorrhea.       Mouth/Throat:      Mouth: Mucous membranes are moist.   Eyes:      General: No scleral icterus.        Right eye: No discharge.         Left eye: No discharge.      Extraocular Movements: Extraocular movements intact.      Conjunctiva/sclera: Conjunctivae normal.      Pupils: Pupils are equal, round, and reactive to light.   Cardiovascular:      Rate and Rhythm: Normal rate.      Pulses: Normal pulses.      Heart sounds: Normal heart sounds.   Pulmonary:      Effort: Pulmonary effort is normal.      Breath sounds: Normal breath sounds.   Abdominal:      General: Bowel sounds are normal.      Palpations: Abdomen is soft.   Musculoskeletal:         General: Normal range of motion.      Cervical back: Normal range of motion and neck supple.      Right lower leg: No edema.      Left lower leg: No edema.   Skin:     General: Skin is warm and dry.      Capillary Refill: Capillary refill takes less than 2 seconds.   Neurological:      General: No focal deficit present.      Mental Status: He is alert and oriented to person, place, and time.   Psychiatric:         Mood and Affect: Mood normal.         Behavior: Behavior normal.         Thought Content: Thought content normal.         Judgment: Judgment normal.           Results Review:  I have personally reviewed most recent cardiac tracings, lab results and radiology images and interpretations and agree with findings.    Results from last 7 days   Lab Units 06/23/21  1542   WBC 10*3/mm3 6.80   HEMOGLOBIN g/dL 9.4*   HEMATOCRIT % 28.4*   PLATELETS 10*3/mm3 204     Results from last 7 days   Lab Units 06/23/21  1542   SODIUM mmol/L 138   POTASSIUM mmol/L 5.8*   CHLORIDE mmol/L 99   CO2 mmol/L 19.0*   BUN mg/dL 96*   CREATININE mg/dL 13.85*   GLUCOSE mg/dL 109*   CALCIUM mg/dL 7.8*     Estimated Creatinine Clearance: 5.5 mL/min (A) (by C-G formula based on SCr of 13.85 mg/dL (H)).  Brief Urine Lab Results     None          Microbiology Results (last 10 days)     Procedure  Component Value - Date/Time    COVID PRE-OP / PRE-PROCEDURE SCREENING ORDER (NO ISOLATION) - Swab, Nasopharynx [[id number]]  (Normal) Collected: 06/18/21 1623    Lab Status: Final result Specimen: Swab from Nasopharynx Updated: 06/18/21 1720    Narrative:      The following orders were created for panel order COVID PRE-OP / PRE-PROCEDURE SCREENING ORDER (NO ISOLATION) - Swab, Nasopharynx.  Procedure                               Abnormality         Status                     ---------                               -----------         ------                     COVID-19,CEPHEID,COR/DARRYN...[[id number]]  Normal              Final result                 Please view results for these tests on the individual orders.    COVID-19,CEPHEID,COR/DARRYN/PAD/RICK IN-HOUSE(OR EMERGENT/ADD-ON),NP SWAB IN TRANSPORT MEDIA 3-4 HR TAT, RT-PCR - Swab, Nasopharynx [[id number]]  (Normal) Collected: 06/18/21 1623    Lab Status: Final result Specimen: Swab from Nasopharynx Updated: 06/18/21 1720     COVID19 Not Detected    Narrative:      Fact sheet for providers: https://www.fda.gov/media/531412/download     Fact sheet for patients: https://www.fda.gov/media/559884/download  Fact sheet for providers: https://www.fda.gov/media/970484/download     Fact sheet for patients: https://www.fda.gov/media/568602/download    COVID PRE-OP / PRE-PROCEDURE SCREENING ORDER (NO ISOLATION) - Swab, Nasopharynx [[id number]]  (Normal) Collected: 06/13/21 1745    Lab Status: Final result Specimen: Swab from Nasopharynx Updated: 06/13/21 1839    Narrative:      The following orders were created for panel order COVID PRE-OP / PRE-PROCEDURE SCREENING ORDER (NO ISOLATION) - Swab, Nasopharynx.  Procedure                               Abnormality         Status                     ---------                               -----------         ------                     COVID-19,CEPHEID,COR/DARRYN...[[id number]]  Normal              Final result                 Please view results  for these tests on the individual orders.    COVID-19,CEPHEID,COR/DARRYN/PAD/RICK IN-HOUSE(OR EMERGENT/ADD-ON),NP SWAB IN TRANSPORT MEDIA 3-4 HR TAT, RT-PCR - Swab, Nasopharynx [[id number]]  (Normal) Collected: 06/13/21 1745    Lab Status: Final result Specimen: Swab from Nasopharynx Updated: 06/13/21 1839     COVID19 Not Detected    Narrative:      Fact sheet for providers: https://www.fda.gov/media/224972/download     Fact sheet for patients: https://www.fda.gov/media/267889/download  Fact sheet for providers: https://www.fda.gov/media/304099/download     Fact sheet for patients: https://www.fda.gov/media/415848/download          ECG/EMG Results (most recent)     None          Results for orders placed during the hospital encounter of 05/29/21    Duplex Hemodialysis Access CAR    Interpretation Summary  · Patent right radiocephalic AV fistula with an anastomotic stenosis and adequate volume flow. 4 mm cephalic vein tributary proximally.          No radiology results for the last 7 days      Estimated Creatinine Clearance: 5.5 mL/min (A) (by C-G formula based on SCr of 13.85 mg/dL (H)).    Assessment/Plan   Assessment/Plan       Active Hospital Problems    Diagnosis  POA   • Chronic kidney disease on chronic dialysis (CMS/[facility]) [N18.6, Z99.2]  Not Applicable      Resolved Hospital Problems   No resolved problems to display.     End-stage renal disease, chronic-dialysis complete per nephrology recommendations; Calcitrol  -Potassium 5.8, anion gap 20, CO2 19     Anemia, hemoglobin 9.4 related to ESRD which is stable from prior visit: no further evaluation at this time   -Patient denies any melena or hematochezia     Hypertension, chronic: Continue Catapres; continue losartan; continue nifedipine as needed      GERD, chronic: Continue Pepcid      VTE Prophylaxis -   Mechanical Order History:      Ordered        06/23/21 1702  Maintain Sequential Compression Device  Continuous         06/23/21 1702  Place Sequential  Compression Device  Once                 Pharmalogical Order History:     None          CODE STATUS:    Code Status and Medical Interventions:   Ordered at: 06/23/21 1702     Code Status:    CPR     Medical Interventions (Level of Support Prior to Arrest):    Full       This patient has been examined wearing appropriate Personal Protective Equipment. The patient was evaluated in the emergency room 06/23/21      I discussed the patient's findings and my recommendations with patient.      Signature:Electronically signed by LAISHA Mnuoz, 06/24/21, 7:17 AM EDT.

## 2021-06-23 NOTE — ED PROVIDER NOTES
"Subjective   Chief Complaint: \"I need dialysis\"    Patient is a 50-year-old  male with history of ESRD on dialysis presents the ER with complaints of \"I need dialysis\".  Patient is an undocumented immigrant, does not have his green card, also does not have insurance and cannot does not qualify for outpatient dialysis.  Patient comes to the ER periodically for emergent hemodialysis.  Patient reports he had some nausea and fatigue yesterday, no complaints today, no abdominal pain, nausea vomiting diarrhea, no chest pain or shortness of breath.  No headache dizziness or lightheadedness.    PCP: None      History provided by:  Patient      Review of Systems   Constitutional: Negative for chills, fatigue and fever.   HENT: Negative for sore throat and trouble swallowing.    Respiratory: Negative for shortness of breath and wheezing.    Cardiovascular: Negative for chest pain and leg swelling.   Gastrointestinal: Negative for abdominal pain, diarrhea, nausea and vomiting.   Genitourinary: Negative for dysuria.   Skin: Negative for rash.   Neurological: Negative for weakness and headaches.   Psychiatric/Behavioral: Negative for behavioral problems.   All other systems reviewed and are negative.      Past Medical History:   Diagnosis Date   • History of renal dialysis    • Hypertension    • Renal disorder        Allergies   Allergen Reactions   • Heparin Other (See Comments)     Patient is thrombocytopenic.  Avoiding heparin at this time.       Past Surgical History:   Procedure Laterality Date   • VASCULAR SURGERY      tunneled catheter       Family History   Problem Relation Age of Onset   • Kidney disease Mother        Social History     Socioeconomic History   • Marital status: Single     Spouse name: Not on file   • Number of children: Not on file   • Years of education: Not on file   • Highest education level: Not on file   Tobacco Use   • Smoking status: Former Smoker   • Smokeless tobacco: Former User "   Vaping Use   • Vaping Use: Never used   Substance and Sexual Activity   • Alcohol use: Never   • Drug use: Never   • Sexual activity: Defer           Objective   Physical Exam  Vitals and nursing note reviewed.   Constitutional:       General: He is not in acute distress.     Appearance: Normal appearance. He is normal weight. He is not diaphoretic.   HENT:      Head: Normocephalic.      Right Ear: Tympanic membrane normal.      Left Ear: Tympanic membrane normal.      Nose: Nose normal.      Mouth/Throat:      Mouth: Mucous membranes are moist.   Eyes:      Extraocular Movements: Extraocular movements intact.      Pupils: Pupils are equal, round, and reactive to light.   Cardiovascular:      Rate and Rhythm: Normal rate and regular rhythm.      Pulses: Normal pulses.      Heart sounds: Normal heart sounds. No murmur heard.     Pulmonary:      Effort: Pulmonary effort is normal.      Breath sounds: Normal breath sounds. No wheezing.   Abdominal:      General: Abdomen is flat.      Tenderness: There is no abdominal tenderness.   Musculoskeletal:         General: Normal range of motion.      Cervical back: Normal range of motion.   Skin:     General: Skin is warm.      Capillary Refill: Capillary refill takes less than 2 seconds.      Comments: Fistula right arm   Neurological:      General: No focal deficit present.      Mental Status: He is alert and oriented to person, place, and time.      Motor: No weakness.   Psychiatric:         Mood and Affect: Mood normal.         Behavior: Behavior normal.         Procedures           ED Course  ED Course as of Jun 23 1551   Wed Jun 23, 2021   1519 Spoke with Dr. Streeter who states he will put in dialysis orders for patient    [MM]   1537 Spoke with Fabiola Fernandes NP for observation unit who agrees to accept patient to observation unit for hemodialysis    [MM]      ED Course User Index  [MM] Danika Mclean PA    /81 (BP Location: Left arm, Patient Position: Sitting)   " Pulse 68   Temp 97.7 °F (36.5 °C) (Oral)   Resp 18   Ht 162.6 cm (64\")   Wt 62.4 kg (137 lb 9.1 oz)   SpO2 99%   BMI 23.61 kg/m²   Labs Reviewed   CBC WITH AUTO DIFFERENTIAL - Abnormal; Notable for the following components:       Result Value    RBC 3.04 (*)     Hemoglobin 9.4 (*)     Hematocrit 28.4 (*)     Neutrophil % 88.3 (*)     Lymphocyte % 7.7 (*)     Monocyte % 3.1 (*)     Eosinophil % 0.2 (*)     Lymphocytes, Absolute 0.50 (*)     All other components within normal limits   BASIC METABOLIC PANEL   CBC AND DIFFERENTIAL    Narrative:     The following orders were created for panel order CBC & Differential.  Procedure                               Abnormality         Status                     ---------                               -----------         ------                     CBC Auto Differential[[id number]]        Abnormal            Final result                 Please view results for these tests on the individual orders.     Medications - No data to display  No radiology results for the last day                                         MDM  Number of Diagnoses or Management Options  Chronic kidney disease on chronic dialysis (CMS/[facility])  Diagnosis management comments: MEDICAL DECISION  Epic Chart Review:  Comorbidities: ESRD on dialysis    Lab interpretation:  Labs viewed by me significant for, CMP, CBC pending    While in the ED IV was placed and labs were obtained appropriate PPE was worn during exam and throughout all encounters with the patient.  Patient had the above evaluation.  Patient offers no complaints of pain, discomfort, nausea vomiting, headache, lower extremity swelling.  Patient states he was told to return the ER for dialysis every few days, patient is undocumented immigrant, does not have insurance and cannot obtain outpatient dialysis.   consulted, states she is following the case.  Consequence to nephrologist, spoke with Dr. Streeter, nephrology who states he would " place dialysis orders and is okay with observation admission for hemodialysis.  Patient admitted to observation unit for emergent hemodialysis, I spoke with Fabiola Fernandes NP who agreed to assume care for patient in observation unit.  Patient stable on admission.         Amount and/or Complexity of Data Reviewed  Clinical lab tests: ordered    Patient Progress  Patient progress: stable      Final diagnoses:   Chronic kidney disease on chronic dialysis (CMS/[facility])       ED Disposition  ED Disposition     ED Disposition Condition Comment    Decision to Admit            No follow-up provider specified.       Medication List      No changes were made to your prescriptions during this visit.          Danika Mclean PA  06/23/21 1552

## 2021-06-23 NOTE — CASE MANAGEMENT/SOCIAL WORK
Discharge Planning Assessment   [facility]     Patient Name: Josh Klein  MRN: [id number]  Today's Date: 6/23/2021    Admit Date: 6/23/2021    Discharge Needs Assessment     Row Name 06/23/21 1622       Living Environment    Lives With  other relative(s)    Current Living Arrangements  home/apartment/condo    Primary Care Provided by  self    Provides Primary Care For  no one    Family Caregiver if Needed  other relative(s)    Quality of Family Relationships  helpful    Able to Return to Prior Arrangements  yes       Resource/Environmental Concerns    Resource/Environmental Concerns  financial Getting an  accepting HD facility; obtaining insurance coverage       Transition Planning    Patient/Family Anticipates Transition to  home with family    Patient/Family Anticipated Services at Transition  other (see comments) Hemodialysis    Transportation Anticipated  family or friend will provide       Discharge Needs Assessment    Readmission Within the Last 30 Days  other (see comments) lack of insurance coverage for hemodialysis    Equipment Currently Used at Home  none    Concerns to be Addressed  utilization management    Current Discharge Risk  financial support inadequate        Discharge Plan     Row Name 06/23/21 1625       Plan    Plan  Home. Needs outpatient HD chair arranged    Plan Comments  Pt denies dc needs except finding out his Medicaid Case ID # & getting an outpatient HD spot obtained. Per discussion with patient,MILLIE Alexandra,and  Adriana CLARK, patient has been unable to get an outpatient HD appointment due to lack of insurance & lack of documentation of immigration status.Pt reports he attempted to call Medicaid to discuss, but was told he needed a case ID #. contacted Gracie yan/ [place]. Gracie informed us that patient has Package E - Emergency Services Member ID [id number] effective 6/1/21. Patient was provided this information.  has notified Director Neetu Campos and  will provide David with that information.        Continued Care and Services - Admitted Since 6/23/2021    Coordination has not been started for this encounter.         Demographic Summary     Row Name 06/23/21 1601       General Information    Admission Type  observation    Arrived From  home    Referral Source  hospital clinician/department;high risk screening    Reason for Consult  financial concerns;utilization management concerns    Preferred Language  Cameroonian;other (see comments)     Used During This Interaction  no    General Information Comments  Patient denies need for .Patient verbalizes understanding of English, and speaks English. Reports written information is better understood in Cameroonian       Contact Information    Permission Granted to Share Info With  ;facility ;other (see comments) Pt verbalizes permission for  to speak with , medassist, dialysis agencies, gerardo Armando if needed    Contact Information Obtained for          Functional Status     Row Name 06/23/21 1621       Functional Status    Usual Activity Tolerance  good       Functional Status, IADL    Medications  independent    Meal Preparation  independent    Housekeeping  independent    Laundry  independent    Shopping  independent       Employment/    Current or Previous Occupation  construction        Patient has no PCP. Has paperwork for [facility] from prior admission  Pharmacy: [facility] - patient denies problem affording medications    DC Barrier: needs acute HD as well as outpatient HD facility. Financial or social barriers to obtaining accepting HD facility    Katie Maher RN, [facility]  Office: [phone]  Fax: [phone]  [email or web address]    Katie Maher RN

## 2021-06-23 NOTE — PLAN OF CARE
Goal Outcome Evaluation:  Plan of Care Reviewed With: patient        Progress: no change  Outcome Summary: New patient from ED.

## 2021-06-24 VITALS
HEIGHT: 64 IN | HEART RATE: 62 BPM | WEIGHT: 131.39 LBS | TEMPERATURE: 98 F | OXYGEN SATURATION: 99 % | RESPIRATION RATE: 17 BRPM | SYSTOLIC BLOOD PRESSURE: 152 MMHG | BODY MASS INDEX: 22.43 KG/M2 | DIASTOLIC BLOOD PRESSURE: 89 MMHG

## 2021-06-24 PROBLEM — E87.5 HYPERKALEMIA: Status: RESOLVED | Noted: 2021-05-23 | Resolved: 2021-06-24

## 2021-06-24 LAB
ANION GAP SERPL CALCULATED.3IONS-SCNC: 14 MMOL/L (ref 5–15)
BUN SERPL-MCNC: 32 MG/DL (ref 6–20)
BUN/CREAT SERPL: 4.6 (ref 7–25)
CALCIUM SPEC-SCNC: 8.7 MG/DL (ref 8.6–10.5)
CHLORIDE SERPL-SCNC: 95 MMOL/L (ref 98–107)
CO2 SERPL-SCNC: 28 MMOL/L (ref 22–29)
CREAT SERPL-MCNC: 6.89 MG/DL (ref 0.76–1.27)
GFR SERPL CREATININE-BSD FRML MDRD: 10 ML/MIN/1.73
GFR SERPL CREATININE-BSD FRML MDRD: 8 ML/MIN/1.73
GLUCOSE SERPL-MCNC: 84 MG/DL (ref 65–99)
POTASSIUM SERPL-SCNC: 3.9 MMOL/L (ref 3.5–5.2)
SODIUM SERPL-SCNC: 137 MMOL/L (ref 136–145)

## 2021-06-24 PROCEDURE — 25010000002 EPOETIN ALFA-EPBX 10000 UNIT/ML SOLUTION: Performed by: INTERNAL MEDICINE

## 2021-06-24 PROCEDURE — G0378 HOSPITAL OBSERVATION PER HR: HCPCS

## 2021-06-24 PROCEDURE — 80048 BASIC METABOLIC PNL TOTAL CA: CPT | Performed by: EMERGENCY MEDICINE

## 2021-06-24 RX ORDER — CLONIDINE HYDROCHLORIDE 0.1 MG/1
0.3 TABLET ORAL 3 TIMES DAILY
Status: DISCONTINUED | OUTPATIENT
Start: 2021-06-24 | End: 2021-06-24 | Stop reason: HOSPADM

## 2021-06-24 RX ORDER — FOLIC ACID 1 MG/1
1 TABLET ORAL DAILY
Status: DISCONTINUED | OUTPATIENT
Start: 2021-06-24 | End: 2021-06-24 | Stop reason: HOSPADM

## 2021-06-24 RX ORDER — CALCITRIOL 0.25 UG/1
0.25 CAPSULE, LIQUID FILLED ORAL DAILY
Status: DISCONTINUED | OUTPATIENT
Start: 2021-06-24 | End: 2021-06-24 | Stop reason: HOSPADM

## 2021-06-24 RX ORDER — NIFEDIPINE 60 MG/1
60 TABLET, EXTENDED RELEASE ORAL DAILY PRN
Status: DISCONTINUED | OUTPATIENT
Start: 2021-06-24 | End: 2021-06-24 | Stop reason: HOSPADM

## 2021-06-24 RX ORDER — SODIUM BICARBONATE 650 MG/1
650 TABLET ORAL 3 TIMES DAILY
Status: DISCONTINUED | OUTPATIENT
Start: 2021-06-24 | End: 2021-06-24 | Stop reason: HOSPADM

## 2021-06-24 RX ORDER — LOSARTAN POTASSIUM 50 MG/1
50 TABLET ORAL DAILY
Status: DISCONTINUED | OUTPATIENT
Start: 2021-06-24 | End: 2021-06-24 | Stop reason: HOSPADM

## 2021-06-24 RX ORDER — FAMOTIDINE 20 MG/1
20 TABLET, FILM COATED ORAL DAILY
Status: DISCONTINUED | OUTPATIENT
Start: 2021-06-24 | End: 2021-06-24 | Stop reason: HOSPADM

## 2021-06-24 RX ADMIN — EPOETIN ALFA-EPBX 10000 UNITS: 10000 INJECTION, SOLUTION INTRAVENOUS; SUBCUTANEOUS at 08:39

## 2021-06-24 NOTE — DISCHARGE PLACEMENT REQUEST
"Deirdre Campos (50 y.o. Male)     Date of Birth Social Security Number Address Home Phone MRN    1970  [id number] [place] [address] [phone] [id number]    Church Marital Status          Sikhism Single       Admission Date Admission Type Admitting Provider Attending Provider Department, Room/Bed    6/23/21 Emergency Wagner Bundy MD  [facility] OBSERVATION, 103/1    Discharge Date Discharge Disposition Discharge Destination        6/24/2021 Home or Self Care              Attending Provider: (none)   Allergies: Heparin    Isolation: None   Infection: None   Code Status: Prior    Ht: 162.6 cm (64\")   Wt: 59.6 kg (131 lb 6.3 oz)    Admission Cmt: None   Principal Problem: None                Active Insurance as of 6/23/2021     Primary Coverage     Payor Plan Insurance Group Employer/Plan Group    [place]      Payor Plan Address Payor Plan Phone Number Payor Plan Fax Number Effective Dates    [address]   6/1/2021 - None Entered    [place] [address]       Subscriber Name Subscriber Birth Date Member ID       DEIRDRE CAMPOS 1970 [id number]                 Emergency Contacts          No emergency contacts on file.            Lab Results (all)     Procedure Component Value Units Date/Time    Basic Metabolic Panel [[id number]]  (Abnormal) Collected: 06/24/21 0616    Specimen: Blood Updated: 06/24/21 0732     Glucose 84 mg/dL      BUN 32 mg/dL      Creatinine 6.89 mg/dL      Sodium 137 mmol/L      Potassium 3.9 mmol/L      Comment: Slight hemolysis detected by analyzer. Results may be affected.        Chloride 95 mmol/L      CO2 28.0 mmol/L      Calcium 8.7 mg/dL      eGFR  African Amer 10 mL/min/1.73      Comment: <15 Indicative of kidney failure.        eGFR Non African Amer 8 mL/min/1.73      Comment: <15 Indicative of kidney failure.        BUN/Creatinine Ratio 4.6     Anion Gap 14.0 mmol/L     Narrative:      GFR Normal >60  Chronic " Kidney Disease <60  Kidney Failure <15      Hepatitis B Surface Antigen [[id number]]  (Normal) Collected: 06/23/21 1828    Specimen: Blood Updated: 06/23/21 2104     Hepatitis B Surface Ag Non-Reactive    Basic Metabolic Panel [[id number]]  (Abnormal) Collected: 06/23/21 1542    Specimen: Blood Updated: 06/23/21 1621     Glucose 109 mg/dL      BUN 96 mg/dL      Creatinine 13.85 mg/dL      Sodium 138 mmol/L      Potassium 5.8 mmol/L      Chloride 99 mmol/L      CO2 19.0 mmol/L      Calcium 7.8 mg/dL      eGFR  African Amer 5 mL/min/1.73      Comment: <15 Indicative of kidney failure.        eGFR Non African Amer 4 mL/min/1.73      Comment: <15 Indicative of kidney failure.        BUN/Creatinine Ratio 6.9     Anion Gap 20.0 mmol/L     Narrative:      GFR Normal >60  Chronic Kidney Disease <60  Kidney Failure <15      CBC & Differential [[id number]]  (Abnormal) Collected: 06/23/21 1542    Specimen: Blood Updated: 06/23/21 1547    Narrative:      The following orders were created for panel order CBC & Differential.  Procedure                               Abnormality         Status                     ---------                               -----------         ------                     CBC Auto Differential[[id number]]        Abnormal            Final result                 Please view results for these tests on the individual orders.    CBC Auto Differential [[id number]]  (Abnormal) Collected: 06/23/21 1542    Specimen: Blood Updated: 06/23/21 1547     WBC 6.80 10*3/mm3      RBC 3.04 10*6/mm3      Hemoglobin 9.4 g/dL      Hematocrit 28.4 %      MCV 93.2 fL      MCH 30.9 pg      MCHC 33.2 g/dL      RDW 14.1 %      RDW-SD 45.9 fl      MPV 8.9 fL      Platelets 204 10*3/mm3      Neutrophil % 88.3 %      Lymphocyte % 7.7 %      Monocyte % 3.1 %      Eosinophil % 0.2 %      Basophil % 0.7 %      Neutrophils, Absolute 6.00 10*3/mm3      Lymphocytes, Absolute 0.50 10*3/mm3      Monocytes, Absolute 0.20 10*3/mm3      Eosinophils,  Absolute 0.00 10*3/mm3      Basophils, Absolute 0.00 10*3/mm3      nRBC 0.0 /100 WBC              Consult Notes (last 24 hours) (Notes from 06/23/21 1255 through 06/24/21 1255)      Hitesh Streeter MD at 06/24/21 0805      Consult Orders    1. Nephrology (on -call MD unless specified) [[id number]] ordered by Danika Mclean PA at 06/23/21 1508               RENAL/KCC:    Referring Provider: Dr. Bundy  Reason for Consultation: ESRD    Subjective     Chief complaint: Uremia    History of present illness:  Patient is a 49 yo  male with h/o ESRD but no outpatient HD spot due to not having insurance coverage.  He presents to the ER in need of HD.  He is s/p HD overnight and he feels much better this AM.    History  Past Medical History:   Diagnosis Date   • History of renal dialysis    • Hypertension    • Renal disorder    ,   Past Surgical History:   Procedure Laterality Date   • VASCULAR SURGERY      tunneled catheter   ,   Family History   Problem Relation Age of Onset   • Kidney disease Mother    ,   Social History     Socioeconomic History   • Marital status: Single     Spouse name: Not on file   • Number of children: Not on file   • Years of education: Not on file   • Highest education level: Not on file   Tobacco Use   • Smoking status: Former Smoker   • Smokeless tobacco: Former User   Vaping Use   • Vaping Use: Never used   Substance and Sexual Activity   • Alcohol use: Never   • Drug use: Never   • Sexual activity: Defer     E-cigarette/Vaping   • E-cigarette/Vaping Use Never User      E-cigarette/Vaping Substances     E-cigarette/Vaping Devices       ,   Medications Prior to Admission   Medication Sig Dispense Refill Last Dose   • acetaminophen (TYLENOL) 325 MG tablet Take 650 mg by mouth Every 6 (Six) Hours As Needed for Mild Pain .   Past Week at Unknown time   • calcitriol (ROCALTROL) 0.25 MCG capsule Take 0.25 mcg by mouth Daily.   6/23/2021 at Unknown time   • cloNIDine (CATAPRES) 0.3  MG tablet Take 0.3 mg by mouth 3 (Three) Times a Day.   6/23/2021 at Unknown time   • famotidine (PEPCID) 20 MG tablet Take 1 tablet by mouth Daily for 90 days. 30 tablet 2 6/23/2021 at Unknown time   • folic acid (FOLVITE) 1 MG tablet Take 1 tablet by mouth Daily for 90 days. 30 tablet 2 6/23/2021 at Unknown time   • losartan (COZAAR) 50 MG tablet Take 1 tablet by mouth Daily for 90 days. 30 tablet 2 6/23/2021 at Unknown time   • NIFEdipine XL (PROCARDIA XL) 90 MG 24 hr tablet Take 45 mg by mouth Daily As Needed. Patient states moving to half tab prn   6/23/2021 at Unknown time   • predniSONE (DELTASONE) 20 MG tablet [place] 1 tableta por vía oral diariamente x 5 días, luego tome la mitad de la tableta diaria x 5 días. 10 tablet 0    • sodium bicarbonate 650 MG tablet Take 1 tablet by mouth 3 (Three) Times a Day. 90 tablet 1 6/23/2021 at Unknown time   , Scheduled Meds:  calcitriol, 0.25 mcg, Oral, Daily  cloNIDine, 0.3 mg, Oral, TID  epoetin valeri-epbx, 10,000 Units, Subcutaneous, Once  famotidine, 20 mg, Oral, Daily  folic acid, 1 mg, Oral, Daily  losartan, 50 mg, Oral, Daily  sodium bicarbonate, 650 mg, Oral, TID  sodium chloride, 10 mL, Intravenous, Q12H    , Continuous Infusions:   , PRN Meds:  •  acetaminophen **OR** acetaminophen **OR** acetaminophen  •  NIFEdipine XL  •  ondansetron **OR** ondansetron  •  sodium chloride and Allergies:  Heparin    Review of Systems  Pertinent items are noted in HPI    Objective     Vital Signs  Temp:  [97.7 °F (36.5 °C)-98.3 °F (36.8 °C)] 98 °F (36.7 °C)  Heart Rate:  [53-75] 62  Resp:  [16-18] 17  BP: (119-176)/(68-96) 152/89    No intake/output data recorded.  I/O last 3 completed shifts:  In: 240 [P.O.:240]  Out: -     Physical Exam:  GEN: Awake, NAD  ENT: PERRL, EOMI, MMM  NECK: Supple, no JVD  CHEST: CTAB, no W/R/C  CV: RRR, no M/G/R  ABD: Soft, NT, +BS  SKIN: Warm and Dry  NEURO: CN's intact      Results Review:   I reviewed the patient's new clinical results.    WBC WBC    Date Value Ref Range Status   06/23/2021 6.80 3.40 - 10.80 10*3/mm3 Final      HGB Hemoglobin   Date Value Ref Range Status   06/23/2021 9.4 (L) 13.0 - 17.7 g/dL Final      HCT Hematocrit   Date Value Ref Range Status   06/23/2021 28.4 (L) 37.5 - 51.0 % Final      Platlets No results found for: LABPLAT   MCV MCV   Date Value Ref Range Status   06/23/2021 93.2 79.0 - 97.0 fL Final          Sodium Sodium   Date Value Ref Range Status   06/24/2021 137 136 - 145 mmol/L Final   06/23/2021 138 136 - 145 mmol/L Final      Potassium Potassium   Date Value Ref Range Status   06/24/2021 3.9 3.5 - 5.2 mmol/L Final     Comment:     Slight hemolysis detected by analyzer. Results may be affected.   06/23/2021 5.8 (H) 3.5 - 5.2 mmol/L Final      Chloride Chloride   Date Value Ref Range Status   06/24/2021 95 (L) 98 - 107 mmol/L Final   06/23/2021 99 98 - 107 mmol/L Final      CO2 CO2   Date Value Ref Range Status   06/24/2021 28.0 22.0 - 29.0 mmol/L Final   06/23/2021 19.0 (L) 22.0 - 29.0 mmol/L Final      BUN BUN   Date Value Ref Range Status   06/24/2021 32 (H) 6 - 20 mg/dL Final   06/23/2021 96 (H) 6 - 20 mg/dL Final      Creatinine Creatinine   Date Value Ref Range Status   06/24/2021 6.89 (H) 0.76 - 1.27 mg/dL Final   06/23/2021 13.85 (H) 0.76 - 1.27 mg/dL Final      Calcium Calcium   Date Value Ref Range Status   06/24/2021 8.7 8.6 - 10.5 mg/dL Final   06/23/2021 7.8 (L) 8.6 - 10.5 mg/dL Final      PO4 No results found for: CAPO4   Albumin No results found for: ALBUMIN   Magnesium No results found for: MG   Uric Acid No results found for: URICACID         calcitriol, 0.25 mcg, Oral, Daily  cloNIDine, 0.3 mg, Oral, TID  epoetin valeri-epbx, 10,000 Units, Subcutaneous, Once  famotidine, 20 mg, Oral, Daily  folic acid, 1 mg, Oral, Daily  losartan, 50 mg, Oral, Daily  sodium bicarbonate, 650 mg, Oral, TID  sodium chloride, 10 mL, Intravenous, Q12H           Assessment/Plan     1) ESRD  2) HTN  3) Hyperkalemia  4) Anemia    PLAN:  S/P acute HD.  OK for D/C.  I have emailed the René  and  with updated [place] Medicaid information to see if we can get an outpatient spot.  René will follow-up with the patient and family.      Hitesh Streeter MD   [facility]  06/24/21  @NOW          Electronically signed by Hitesh Streeter MD at 06/24/21 0810         JAKOB Trejo    Phone: [phone]  Cell: [phone]  Fax: [phone]  [email or web address]

## 2021-06-24 NOTE — CASE MANAGEMENT/SOCIAL WORK
Social Work Assessment  [facility]     Patient Name: Josh Klein  MRN: [id number]  Today's Date: 6/24/2021    Admit Date: 6/23/2021    Discharge Plan     Row Name 06/24/21 1256       Plan    Plan  DC Plan: [facility] Outpatient HD - chair time pending. See  comments.    Patient/Family in Agreement with Plan  yes    Plan Comments   notified that patient's insurance is now active. Package E - Emergency Services Member ID: [id number]. This information was shared on 6/23 with David GLORIA and other [facility] representative via email. Dr. Streeter notified this AM regarding insurance. Received call from David (spoke with Darlene) requsting  fax HepB Antigen results, updated HD flowsheets, and new labs. Information faxed to [phone] via ad-hoc communication. Notified Darlene that patient is American speaking. Due to patient discharging prior to  seeing, contacted family (Prabha Miner, [phone]) who will be updated patient regarding pending chair time.     Phone communication or documentation only - no physical contact with patient or family.  JAKOB Trejo    Phone: [phone]  Cell: [phone]  Fax: [phone]  [email or web address]

## 2021-06-24 NOTE — CASE MANAGEMENT/SOCIAL WORK
Discharge Planning Assessment   [place]     Patient Name: Josh Klein  MRN: [id number]  Today's Date: 6/24/2021    Admit Date: 6/23/2021    Discharge Needs Assessment    No documentation.       Discharge Plan     Row Name 06/24/21 1133       Plan    Plan Comments  pt dc'd prior to being  seen by  this  a.m. Dr. Streeter's note reviewed. He was ok with patient discharging. He emailed patient's updated  Medicaid information to David  and  to see if an outpatient HD spot. can be obtained.  Per Dr. Guo's note,   David will follow-up with the patient and family        Katie Maher RN, [facility]  Office: [phone]  Fax: [phone]  [email or web address]    Katie Maher RN

## 2021-06-24 NOTE — PLAN OF CARE
Problem: Adult Inpatient Plan of Care  Goal: Plan of Care Review  Outcome: Ongoing, Progressing  Flowsheets (Taken 6/23/2021 1742 by Arpita Jerome RN)  Plan of Care Reviewed With: patient  Goal: Patient-Specific Goal (Individualized)  Outcome: Ongoing, Progressing  Goal: Absence of Hospital-Acquired Illness or Injury  Outcome: Ongoing, Progressing  Intervention: Identify and Manage Fall Risk  Recent Flowsheet Documentation  Taken 6/23/2021 2033 by Alberto Rivera RN  Safety Promotion/Fall Prevention: safety round/check completed  Intervention: Prevent Skin Injury  Recent Flowsheet Documentation  Taken 6/23/2021 2033 by Alberto Rivera RN  Body Position: position changed independently  Goal: Optimal Comfort and Wellbeing  Outcome: Ongoing, Progressing  Intervention: Provide Person-Centered Care  Recent Flowsheet Documentation  Taken 6/23/2021 2033 by Alberto Rivera RN  Trust Relationship/Rapport:   care explained   questions answered  Goal: Readiness for Transition of Care  Outcome: Ongoing, Progressing     Problem: Device-Related Complication Risk (Hemodialysis)  Goal: Safe, Effective Therapy Delivery  Outcome: Ongoing, Progressing  Intervention: Optimize Device Care and Function  Recent Flowsheet Documentation  Taken 6/23/2021 2033 by Alberto Rivera RN  Medication Review/Management: medications reviewed     Problem: Hemodynamic Instability (Hemodialysis)  Goal: Vital Signs Remain in Desired Range  Outcome: Ongoing, Progressing     Problem: Infection (Hemodialysis)  Goal: Absence of Infection Signs and Symptoms  Outcome: Ongoing, Progressing     Problem: Adjustment to Illness (Chronic Kidney Disease)  Goal: Optimal Coping with Chronic Illness  Outcome: Ongoing, Progressing  Intervention: Support and Optimize Psychosocial Response to Chronic Illness  Recent Flowsheet Documentation  Taken 6/23/2021 2033 by Alberto Rivera RN  Supportive Measures: active listening utilized     Problem: Electrolyte Imbalance (Chronic Kidney  Disease)  Goal: Electrolyte Balance  Outcome: Ongoing, Progressing     Problem: Fluid Volume Excess (Chronic Kidney Disease)  Goal: Fluid Balance  Outcome: Ongoing, Progressing     Problem: Functional Decline (Chronic Kidney Disease)  Goal: Optimal Functional Ability  Outcome: Ongoing, Progressing     Problem: Hematologic Alteration (Chronic Kidney Disease)  Goal: Absence of Anemia Signs and Symptoms  Outcome: Ongoing, Progressing  Intervention: Monitor and Manage Signs/Symptoms of Anemia and Bleeding  Recent Flowsheet Documentation  Taken 6/23/2021 2033 by Alberto Rivera RN  Environmental Support: calm environment promoted     Problem: Oral Intake Inadequate (Chronic Kidney Disease)  Goal: Optimal Oral Intake  Outcome: Ongoing, Progressing     Problem: Renal Function Impairment (Chronic Kidney Disease)  Goal: Laboratory Values and Blood Pressure Within Desired Range  Outcome: Ongoing, Progressing  Intervention: Monitor and Support Renal Function  Recent Flowsheet Documentation  Taken 6/23/2021 2033 by Alberto Rivera, RN  Medication Review/Management: medications reviewed   Goal Outcome Evaluation:

## 2021-06-24 NOTE — PLAN OF CARE
Goal Outcome Evaluation:  Plan of Care Reviewed With: patient        Progress: improving  Outcome Summary: Patient discharging.

## 2021-06-24 NOTE — CONSULTS
RENAL/KCC:    Referring Provider: Dr. Bundy  Reason for Consultation: ESRD    Subjective     Chief complaint: Uremia    History of present illness:  Patient is a 49 yo  male with h/o ESRD but no outpatient HD spot due to not having insurance coverage.  He presents to the ER in need of HD.  He is s/p HD overnight and he feels much better this AM.    History  Past Medical History:   Diagnosis Date   • History of renal dialysis    • Hypertension    • Renal disorder    ,   Past Surgical History:   Procedure Laterality Date   • VASCULAR SURGERY      tunneled catheter   ,   Family History   Problem Relation Age of Onset   • Kidney disease Mother    ,   Social History     Socioeconomic History   • Marital status: Single     Spouse name: Not on file   • Number of children: Not on file   • Years of education: Not on file   • Highest education level: Not on file   Tobacco Use   • Smoking status: Former Smoker   • Smokeless tobacco: Former User   Vaping Use   • Vaping Use: Never used   Substance and Sexual Activity   • Alcohol use: Never   • Drug use: Never   • Sexual activity: Defer     E-cigarette/Vaping   • E-cigarette/Vaping Use Never User      E-cigarette/Vaping Substances     E-cigarette/Vaping Devices       ,   Medications Prior to Admission   Medication Sig Dispense Refill Last Dose   • acetaminophen (TYLENOL) 325 MG tablet Take 650 mg by mouth Every 6 (Six) Hours As Needed for Mild Pain .   Past Week at Unknown time   • calcitriol (ROCALTROL) 0.25 MCG capsule Take 0.25 mcg by mouth Daily.   6/23/2021 at Unknown time   • cloNIDine (CATAPRES) 0.3 MG tablet Take 0.3 mg by mouth 3 (Three) Times a Day.   6/23/2021 at Unknown time   • famotidine (PEPCID) 20 MG tablet Take 1 tablet by mouth Daily for 90 days. 30 tablet 2 6/23/2021 at Unknown time   • folic acid (FOLVITE) 1 MG tablet Take 1 tablet by mouth Daily for 90 days. 30 tablet 2 6/23/2021 at Unknown time   • losartan (COZAAR) 50 MG tablet Take 1 tablet by  mouth Daily for 90 days. 30 tablet 2 6/23/2021 at Unknown time   • NIFEdipine XL (PROCARDIA XL) 90 MG 24 hr tablet Take 45 mg by mouth Daily As Needed. Patient states moving to half tab prn   6/23/2021 at Unknown time   • predniSONE (DELTASONE) 20 MG tablet [place] 1 tableta por vía oral diariamente x 5 días, luego tome la mitad de la tableta diaria x 5 días. 10 tablet 0    • sodium bicarbonate 650 MG tablet Take 1 tablet by mouth 3 (Three) Times a Day. 90 tablet 1 6/23/2021 at Unknown time   , Scheduled Meds:  calcitriol, 0.25 mcg, Oral, Daily  cloNIDine, 0.3 mg, Oral, TID  epoetin valeri-epbx, 10,000 Units, Subcutaneous, Once  famotidine, 20 mg, Oral, Daily  folic acid, 1 mg, Oral, Daily  losartan, 50 mg, Oral, Daily  sodium bicarbonate, 650 mg, Oral, TID  sodium chloride, 10 mL, Intravenous, Q12H    , Continuous Infusions:   , PRN Meds:  •  acetaminophen **OR** acetaminophen **OR** acetaminophen  •  NIFEdipine XL  •  ondansetron **OR** ondansetron  •  sodium chloride and Allergies:  Heparin    Review of Systems  Pertinent items are noted in HPI    Objective     Vital Signs  Temp:  [97.7 °F (36.5 °C)-98.3 °F (36.8 °C)] 98 °F (36.7 °C)  Heart Rate:  [53-75] 62  Resp:  [16-18] 17  BP: (119-176)/(68-96) 152/89    No intake/output data recorded.  I/O last 3 completed shifts:  In: 240 [P.O.:240]  Out: -     Physical Exam:  GEN: Awake, NAD  ENT: PERRL, EOMI, MMM  NECK: Supple, no JVD  CHEST: CTAB, no W/R/C  CV: RRR, no M/G/R  ABD: Soft, NT, +BS  SKIN: Warm and Dry  NEURO: CN's intact      Results Review:   I reviewed the patient's new clinical results.    WBC WBC   Date Value Ref Range Status   06/23/2021 6.80 3.40 - 10.80 10*3/mm3 Final      HGB Hemoglobin   Date Value Ref Range Status   06/23/2021 9.4 (L) 13.0 - 17.7 g/dL Final      HCT Hematocrit   Date Value Ref Range Status   06/23/2021 28.4 (L) 37.5 - 51.0 % Final      Platlets No results found for: LABPLAT   MCV MCV   Date Value Ref Range Status   06/23/2021 93.2 79.0  - 97.0 fL Final          Sodium Sodium   Date Value Ref Range Status   06/24/2021 137 136 - 145 mmol/L Final   06/23/2021 138 136 - 145 mmol/L Final      Potassium Potassium   Date Value Ref Range Status   06/24/2021 3.9 3.5 - 5.2 mmol/L Final     Comment:     Slight hemolysis detected by analyzer. Results may be affected.   06/23/2021 5.8 (H) 3.5 - 5.2 mmol/L Final      Chloride Chloride   Date Value Ref Range Status   06/24/2021 95 (L) 98 - 107 mmol/L Final   06/23/2021 99 98 - 107 mmol/L Final      CO2 CO2   Date Value Ref Range Status   06/24/2021 28.0 22.0 - 29.0 mmol/L Final   06/23/2021 19.0 (L) 22.0 - 29.0 mmol/L Final      BUN BUN   Date Value Ref Range Status   06/24/2021 32 (H) 6 - 20 mg/dL Final   06/23/2021 96 (H) 6 - 20 mg/dL Final      Creatinine Creatinine   Date Value Ref Range Status   06/24/2021 6.89 (H) 0.76 - 1.27 mg/dL Final   06/23/2021 13.85 (H) 0.76 - 1.27 mg/dL Final      Calcium Calcium   Date Value Ref Range Status   06/24/2021 8.7 8.6 - 10.5 mg/dL Final   06/23/2021 7.8 (L) 8.6 - 10.5 mg/dL Final      PO4 No results found for: CAPO4   Albumin No results found for: ALBUMIN   Magnesium No results found for: MG   Uric Acid No results found for: URICACID         calcitriol, 0.25 mcg, Oral, Daily  cloNIDine, 0.3 mg, Oral, TID  epoetin valeri-epbx, 10,000 Units, Subcutaneous, Once  famotidine, 20 mg, Oral, Daily  folic acid, 1 mg, Oral, Daily  losartan, 50 mg, Oral, Daily  sodium bicarbonate, 650 mg, Oral, TID  sodium chloride, 10 mL, Intravenous, Q12H           Assessment/Plan     1) ESRD  2) HTN  3) Hyperkalemia  4) Anemia    PLAN: S/P acute HD.  OK for D/C.  I have emailed the [facility]  and  with updated [place] Medicaid information to see if we can get an outpatient spot.  René will follow-up with the patient and family.      Hitesh Streeter MD   [facility]  06/24/21  @NOW

## 2021-06-28 ENCOUNTER — TELEPHONE (OUTPATIENT)
Dept: SOCIAL WORK | Facility: HOSPITAL | Age: 51
End: 2021-06-28

## 2021-06-28 NOTE — TELEPHONE ENCOUNTER
SW received a call from [facility] (Darlene, [phone] ext. [phone]), who left a message regarding outpatient HD spot. Per Darlene, patient is accepted to [facility], but needed to discuss chair time. Returned call to [facility] (spoke with Erin), who confirmed patient was accepted.     Outpatient HD Chair Time: MWF 1:40pm. Start date is 7/2nd at 1:15pm for first session/new patient paperwork. Requesting niece (Prabha) come with patient to first session. Location: [address] [place] [address], [place] [address].     Contacted patient's niece and provided above information for chair time, start date, location, etc. Niece confirmed information and that she can go with patient to first session. Niece to notify patient of appointment.     Phone communication or documentation only - no physical contact with patient or family.  JAKOB Trejo    Phone: [phone]  Cell: [phone]  Fax: [phone]  [email or web address]

## 2021-06-28 NOTE — CASE MANAGEMENT/SOCIAL WORK
Case Management Discharge Note      Final Note: home         Selected Continued Care - Discharged on 6/24/2021 Admission date: 6/23/2021 - Discharge disposition: Home or Self Care                 Final Discharge Disposition Code: 01 - home or self-care

## 2021-06-29 ENCOUNTER — HOSPITAL ENCOUNTER (OUTPATIENT)
Facility: HOSPITAL | Age: 51
Setting detail: OBSERVATION
Discharge: HOME | End: 2021-07-01
Attending: EMERGENCY MEDICINE | Admitting: EMERGENCY MEDICINE

## 2021-06-29 ENCOUNTER — APPOINTMENT (OUTPATIENT)
Dept: CT IMAGING | Facility: HOSPITAL | Age: 51
End: 2021-06-29

## 2021-06-29 DIAGNOSIS — N18.6 ESRD (END STAGE RENAL DISEASE) (HCC): ICD-10-CM

## 2021-06-29 DIAGNOSIS — R51.9 NONINTRACTABLE HEADACHE, UNSPECIFIED CHRONICITY PATTERN, UNSPECIFIED HEADACHE TYPE: Primary | ICD-10-CM

## 2021-06-29 DIAGNOSIS — R42 DIZZINESS: ICD-10-CM

## 2021-06-29 LAB
ANION GAP SERPL CALCULATED.3IONS-SCNC: 19 MMOL/L (ref 5–15)
BASOPHILS # BLD AUTO: 0.1 10*3/MM3 (ref 0–0.2)
BASOPHILS NFR BLD AUTO: 0.9 % (ref 0–1.5)
BUN SERPL-MCNC: 82 MG/DL (ref 6–20)
BUN/CREAT SERPL: 5.5 (ref 7–25)
CALCIUM SPEC-SCNC: 7.7 MG/DL (ref 8.6–10.5)
CHLORIDE SERPL-SCNC: 100 MMOL/L (ref 98–107)
CO2 SERPL-SCNC: 19 MMOL/L (ref 22–29)
CREAT SERPL-MCNC: 15.01 MG/DL (ref 0.76–1.27)
DEPRECATED RDW RBC AUTO: 46.4 FL (ref 37–54)
EOSINOPHIL # BLD AUTO: 0.4 10*3/MM3 (ref 0–0.4)
EOSINOPHIL NFR BLD AUTO: 5.6 % (ref 0.3–6.2)
ERYTHROCYTE [DISTWIDTH] IN BLOOD BY AUTOMATED COUNT: 14.2 % (ref 12.3–15.4)
GFR SERPL CREATININE-BSD FRML MDRD: 3 ML/MIN/1.73
GFR SERPL CREATININE-BSD FRML MDRD: 4 ML/MIN/1.73
GLUCOSE SERPL-MCNC: 107 MG/DL (ref 65–99)
HCT VFR BLD AUTO: 27.9 % (ref 37.5–51)
HGB BLD-MCNC: 9.2 G/DL (ref 13–17.7)
LYMPHOCYTES # BLD AUTO: 1.2 10*3/MM3 (ref 0.7–3.1)
LYMPHOCYTES NFR BLD AUTO: 16.4 % (ref 19.6–45.3)
MCH RBC QN AUTO: 30.8 PG (ref 26.6–33)
MCHC RBC AUTO-ENTMCNC: 32.9 G/DL (ref 31.5–35.7)
MCV RBC AUTO: 93.6 FL (ref 79–97)
MONOCYTES # BLD AUTO: 0.8 10*3/MM3 (ref 0.1–0.9)
MONOCYTES NFR BLD AUTO: 10.7 % (ref 5–12)
NEUTROPHILS NFR BLD AUTO: 4.7 10*3/MM3 (ref 1.7–7)
NEUTROPHILS NFR BLD AUTO: 66.4 % (ref 42.7–76)
NRBC BLD AUTO-RTO: 0.1 /100 WBC (ref 0–0.2)
PLATELET # BLD AUTO: 209 10*3/MM3 (ref 140–450)
PMV BLD AUTO: 9.3 FL (ref 6–12)
POTASSIUM SERPL-SCNC: 5.3 MMOL/L (ref 3.5–5.2)
RBC # BLD AUTO: 2.98 10*6/MM3 (ref 4.14–5.8)
SARS-COV-2 RNA PNL SPEC NAA+PROBE: NOT DETECTED
SODIUM SERPL-SCNC: 138 MMOL/L (ref 136–145)
WBC # BLD AUTO: 7 10*3/MM3 (ref 3.4–10.8)

## 2021-06-29 PROCEDURE — 25010000002 NA FERRIC GLUC CPLX PER 12.5 MG: Performed by: INTERNAL MEDICINE

## 2021-06-29 PROCEDURE — C9803 HOPD COVID-19 SPEC COLLECT: HCPCS

## 2021-06-29 PROCEDURE — G0378 HOSPITAL OBSERVATION PER HR: HCPCS

## 2021-06-29 PROCEDURE — 99284 EMERGENCY DEPT VISIT MOD MDM: CPT

## 2021-06-29 PROCEDURE — 80048 BASIC METABOLIC PNL TOTAL CA: CPT | Performed by: NURSE PRACTITIONER

## 2021-06-29 PROCEDURE — 85025 COMPLETE CBC W/AUTO DIFF WBC: CPT | Performed by: NURSE PRACTITIONER

## 2021-06-29 PROCEDURE — 87635 SARS-COV-2 COVID-19 AMP PRB: CPT | Performed by: EMERGENCY MEDICINE

## 2021-06-29 PROCEDURE — 70450 CT HEAD/BRAIN W/O DYE: CPT

## 2021-06-29 RX ORDER — ACETAMINOPHEN 325 MG/1
650 TABLET ORAL EVERY 6 HOURS PRN
Status: DISCONTINUED | OUTPATIENT
Start: 2021-06-29 | End: 2021-07-01 | Stop reason: HOSPADM

## 2021-06-29 RX ORDER — SODIUM CHLORIDE 0.9 % (FLUSH) 0.9 %
10 SYRINGE (ML) INJECTION EVERY 12 HOURS SCHEDULED
Status: DISCONTINUED | OUTPATIENT
Start: 2021-06-29 | End: 2021-07-01 | Stop reason: HOSPADM

## 2021-06-29 RX ORDER — FOLIC ACID 1 MG/1
1 TABLET ORAL DAILY
Status: DISCONTINUED | OUTPATIENT
Start: 2021-06-29 | End: 2021-07-01 | Stop reason: HOSPADM

## 2021-06-29 RX ORDER — ACETAMINOPHEN 325 MG/1
650 TABLET ORAL EVERY 4 HOURS PRN
Status: DISCONTINUED | OUTPATIENT
Start: 2021-06-29 | End: 2021-07-01 | Stop reason: HOSPADM

## 2021-06-29 RX ORDER — PREDNISONE 20 MG/1
20 TABLET ORAL DAILY
COMMUNITY

## 2021-06-29 RX ORDER — ESCITALOPRAM OXALATE 5 MG/1
5 TABLET ORAL DAILY
COMMUNITY

## 2021-06-29 RX ORDER — SEVELAMER HYDROCHLORIDE 800 MG/1
800 TABLET, FILM COATED ORAL
COMMUNITY
End: 2021-06-29

## 2021-06-29 RX ORDER — CHOLECALCIFEROL (VITAMIN D3) 125 MCG
5 CAPSULE ORAL NIGHTLY PRN
Status: DISCONTINUED | OUTPATIENT
Start: 2021-06-29 | End: 2021-07-01 | Stop reason: HOSPADM

## 2021-06-29 RX ORDER — SODIUM CHLORIDE 0.9 % (FLUSH) 0.9 %
10 SYRINGE (ML) INJECTION AS NEEDED
Status: DISCONTINUED | OUTPATIENT
Start: 2021-06-29 | End: 2021-07-01 | Stop reason: HOSPADM

## 2021-06-29 RX ORDER — CLONIDINE HYDROCHLORIDE 0.1 MG/1
0.3 TABLET ORAL 3 TIMES DAILY
Status: DISCONTINUED | OUTPATIENT
Start: 2021-06-29 | End: 2021-07-01 | Stop reason: HOSPADM

## 2021-06-29 RX ORDER — SODIUM BICARBONATE 650 MG/1
650 TABLET ORAL 3 TIMES DAILY
Status: DISCONTINUED | OUTPATIENT
Start: 2021-06-29 | End: 2021-07-01 | Stop reason: HOSPADM

## 2021-06-29 RX ORDER — ALBUMIN (HUMAN) 12.5 G/50ML
12.5 SOLUTION INTRAVENOUS AS NEEDED
Status: CANCELLED | OUTPATIENT
Start: 2021-06-29 | End: 2021-06-30

## 2021-06-29 RX ORDER — ESCITALOPRAM OXALATE 10 MG/1
5 TABLET ORAL DAILY
Status: DISCONTINUED | OUTPATIENT
Start: 2021-06-29 | End: 2021-07-01 | Stop reason: HOSPADM

## 2021-06-29 RX ORDER — LOSARTAN POTASSIUM 50 MG/1
50 TABLET ORAL DAILY
Status: DISCONTINUED | OUTPATIENT
Start: 2021-06-29 | End: 2021-07-01 | Stop reason: HOSPADM

## 2021-06-29 RX ORDER — ONDANSETRON 2 MG/ML
4 INJECTION INTRAMUSCULAR; INTRAVENOUS EVERY 6 HOURS PRN
Status: DISCONTINUED | OUTPATIENT
Start: 2021-06-29 | End: 2021-07-01 | Stop reason: HOSPADM

## 2021-06-29 RX ORDER — FAMOTIDINE 20 MG/1
20 TABLET, FILM COATED ORAL DAILY
Status: DISCONTINUED | OUTPATIENT
Start: 2021-06-29 | End: 2021-07-01 | Stop reason: HOSPADM

## 2021-06-29 RX ADMIN — Medication 10 ML: at 20:18

## 2021-06-29 RX ADMIN — SODIUM CHLORIDE 125 MG: 9 INJECTION, SOLUTION INTRAVENOUS at 21:02

## 2021-06-29 RX ADMIN — SODIUM BICARBONATE 650 MG: 650 TABLET ORAL at 20:18

## 2021-06-29 RX ADMIN — CLONIDINE HYDROCHLORIDE 0.3 MG: 0.1 TABLET ORAL at 20:18

## 2021-06-29 RX ADMIN — FAMOTIDINE 20 MG: 20 TABLET ORAL at 20:18

## 2021-06-30 LAB
ALBUMIN SERPL-MCNC: 3.6 G/DL (ref 3.5–5.2)
ANION GAP SERPL CALCULATED.3IONS-SCNC: 18 MMOL/L (ref 5–15)
BASOPHILS # BLD AUTO: 0 10*3/MM3 (ref 0–0.2)
BASOPHILS NFR BLD AUTO: 0.6 % (ref 0–1.5)
BUN SERPL-MCNC: 89 MG/DL (ref 6–20)
BUN/CREAT SERPL: 5.6 (ref 7–25)
CALCIUM SPEC-SCNC: 7.9 MG/DL (ref 8.6–10.5)
CHLORIDE SERPL-SCNC: 102 MMOL/L (ref 98–107)
CO2 SERPL-SCNC: 20 MMOL/L (ref 22–29)
CREAT SERPL-MCNC: 16 MG/DL (ref 0.76–1.27)
DEPRECATED RDW RBC AUTO: 47.3 FL (ref 37–54)
EOSINOPHIL # BLD AUTO: 0.5 10*3/MM3 (ref 0–0.4)
EOSINOPHIL NFR BLD AUTO: 7.1 % (ref 0.3–6.2)
ERYTHROCYTE [DISTWIDTH] IN BLOOD BY AUTOMATED COUNT: 14.4 % (ref 12.3–15.4)
GFR SERPL CREATININE-BSD FRML MDRD: 3 ML/MIN/1.73
GFR SERPL CREATININE-BSD FRML MDRD: 4 ML/MIN/1.73
GLUCOSE SERPL-MCNC: 103 MG/DL (ref 65–99)
HCT VFR BLD AUTO: 28.4 % (ref 37.5–51)
HGB BLD-MCNC: 9.4 G/DL (ref 13–17.7)
LYMPHOCYTES # BLD AUTO: 1.5 10*3/MM3 (ref 0.7–3.1)
LYMPHOCYTES NFR BLD AUTO: 19.7 % (ref 19.6–45.3)
MCH RBC QN AUTO: 30.9 PG (ref 26.6–33)
MCHC RBC AUTO-ENTMCNC: 33.1 G/DL (ref 31.5–35.7)
MCV RBC AUTO: 93.6 FL (ref 79–97)
MONOCYTES # BLD AUTO: 0.7 10*3/MM3 (ref 0.1–0.9)
MONOCYTES NFR BLD AUTO: 9.7 % (ref 5–12)
NEUTROPHILS NFR BLD AUTO: 4.8 10*3/MM3 (ref 1.7–7)
NEUTROPHILS NFR BLD AUTO: 62.9 % (ref 42.7–76)
NRBC BLD AUTO-RTO: 0.2 /100 WBC (ref 0–0.2)
PHOSPHATE SERPL-MCNC: 8.6 MG/DL (ref 2.5–4.5)
PLATELET # BLD AUTO: 205 10*3/MM3 (ref 140–450)
PMV BLD AUTO: 9 FL (ref 6–12)
POTASSIUM SERPL-SCNC: 5.5 MMOL/L (ref 3.5–5.2)
RBC # BLD AUTO: 3.03 10*6/MM3 (ref 4.14–5.8)
SODIUM SERPL-SCNC: 140 MMOL/L (ref 136–145)
WBC # BLD AUTO: 7.6 10*3/MM3 (ref 3.4–10.8)

## 2021-06-30 PROCEDURE — 80069 RENAL FUNCTION PANEL: CPT | Performed by: INTERNAL MEDICINE

## 2021-06-30 PROCEDURE — G0257 UNSCHED DIALYSIS ESRD PT HOS: HCPCS

## 2021-06-30 PROCEDURE — G0378 HOSPITAL OBSERVATION PER HR: HCPCS

## 2021-06-30 PROCEDURE — 25010000002 ALTEPLASE 2 MG RECONSTITUTED SOLUTION 1 EACH VIAL: Performed by: INTERNAL MEDICINE

## 2021-06-30 PROCEDURE — 85025 COMPLETE CBC W/AUTO DIFF WBC: CPT | Performed by: NURSE PRACTITIONER

## 2021-06-30 PROCEDURE — 36415 COLL VENOUS BLD VENIPUNCTURE: CPT | Performed by: NURSE PRACTITIONER

## 2021-06-30 RX ORDER — ALBUMIN (HUMAN) 12.5 G/50ML
12.5 SOLUTION INTRAVENOUS AS NEEDED
Status: DISCONTINUED | OUTPATIENT
Start: 2021-06-30 | End: 2021-07-01 | Stop reason: HOSPADM

## 2021-06-30 RX ADMIN — FOLIC ACID 1 MG: 1 TABLET ORAL at 08:09

## 2021-06-30 RX ADMIN — SODIUM BICARBONATE 650 MG: 650 TABLET ORAL at 15:16

## 2021-06-30 RX ADMIN — CLONIDINE HYDROCHLORIDE 0.3 MG: 0.1 TABLET ORAL at 15:16

## 2021-06-30 RX ADMIN — Medication 10 ML: at 22:29

## 2021-06-30 RX ADMIN — ESCITALOPRAM OXALATE 5 MG: 10 TABLET ORAL at 08:09

## 2021-06-30 RX ADMIN — LOSARTAN POTASSIUM 50 MG: 50 TABLET, FILM COATED ORAL at 08:08

## 2021-06-30 RX ADMIN — SODIUM BICARBONATE 650 MG: 650 TABLET ORAL at 08:09

## 2021-06-30 RX ADMIN — CLONIDINE HYDROCHLORIDE 0.3 MG: 0.1 TABLET ORAL at 08:09

## 2021-06-30 RX ADMIN — Medication 10 ML: at 08:10

## 2021-06-30 RX ADMIN — SODIUM BICARBONATE 650 MG: 650 TABLET ORAL at 22:30

## 2021-06-30 RX ADMIN — WATER: 1 INJECTION INTRAMUSCULAR; INTRAVENOUS; SUBCUTANEOUS at 21:41

## 2021-06-30 RX ADMIN — FAMOTIDINE 20 MG: 20 TABLET ORAL at 08:09

## 2021-06-30 RX ADMIN — CLONIDINE HYDROCHLORIDE 0.3 MG: 0.1 TABLET ORAL at 22:29

## 2021-07-01 VITALS
BODY MASS INDEX: 22.3 KG/M2 | HEART RATE: 57 BPM | WEIGHT: 130.6 LBS | RESPIRATION RATE: 15 BRPM | DIASTOLIC BLOOD PRESSURE: 68 MMHG | OXYGEN SATURATION: 99 % | HEIGHT: 64 IN | TEMPERATURE: 98.1 F | SYSTOLIC BLOOD PRESSURE: 109 MMHG

## 2021-07-01 PROBLEM — R51.9 NONINTRACTABLE HEADACHE: Status: RESOLVED | Noted: 2021-06-29 | Resolved: 2021-07-01

## 2021-07-01 PROCEDURE — G0378 HOSPITAL OBSERVATION PER HR: HCPCS

## 2021-07-01 RX ADMIN — FOLIC ACID 1 MG: 1 TABLET ORAL at 08:22

## 2021-07-01 RX ADMIN — FAMOTIDINE 20 MG: 20 TABLET ORAL at 08:22

## 2021-07-01 RX ADMIN — CLONIDINE HYDROCHLORIDE 0.3 MG: 0.1 TABLET ORAL at 09:39

## 2021-07-01 RX ADMIN — SODIUM BICARBONATE 650 MG: 650 TABLET ORAL at 08:22

## 2021-07-01 RX ADMIN — LOSARTAN POTASSIUM 50 MG: 50 TABLET, FILM COATED ORAL at 08:22

## 2021-07-01 RX ADMIN — Medication 10 ML: at 08:22

## 2021-07-01 NOTE — DISCHARGE SUMMARY
[facility]  DISCHARGE SUMMARY        Prepared For PCP:  Provider, No Known    Patient Name: Josh Klein  : 1970  MRN: [id number]      Date of Admission:   2021    Date of Discharge:  2021    Length of stay:  LOS: 0 days     Hospital Course     Presenting Problem:   Dizziness [R42]  ESRD (end stage renal disease) (CMS/[facility]) [N18.6]  Nonintractable headache, unspecified chronicity pattern, unspecified headache type [R51.9]      Active Hospital Problems    Diagnosis  POA   • Nonintractable headache [R51.9]  Yes      Resolved Hospital Problems   No resolved problems to display.           Hospital Course:  Josh Klein is a 50 y.o. male discharge was delayed secondary to dialysis staff delay. Patient dialysis complete at 10:30 pm. Patient had an uneventful stayed overnight.           Recommendation for Outpatient Providers:             Reasons For Change In Medications and Indications for New Medications:        Day of Discharge     HPI: 50-year-old male presents to the ER with a chief complaint of headache which is right-sided radiating from the bottom of the neck to the posterior head lasting approximately 60 to 90 seconds with recurrent episodes.  At time of interview the patient denies pain.  Patient also has known end-stage renal disease and has been receiving treatment through emergency medicine as he has no insurance.  However, the patient is scheduled to have dialysis as an outpatient at [facility] in [place] starting Friday.  The patient denies associated nausea, photophobia, or aura.  There is no visual       Vital Signs:   Temp:  [97.6 °F (36.4 °C)-98.4 °F (36.9 °C)] 98.1 °F (36.7 °C)  Heart Rate:  [50-60] 50  Resp:  [12-16] 12  BP: (151-215)/(79-96) 151/84     ROS:  Review of Systems   All other systems reviewed and are negative.    Physical Exam:  Physical Exam  Vitals and nursing note reviewed.   Constitutional:       Appearance: Normal appearance. He is not  ill-appearing.   HENT:      Head: Normocephalic and atraumatic.      Right Ear: External ear normal.      Left Ear: External ear normal.      Nose: Nose normal. No congestion or rhinorrhea.      Mouth/Throat:      Mouth: Mucous membranes are moist.   Eyes:      General: No scleral icterus.        Right eye: No discharge.         Left eye: No discharge.      Extraocular Movements: Extraocular movements intact.      Conjunctiva/sclera: Conjunctivae normal.      Pupils: Pupils are equal, round, and reactive to light.   Cardiovascular:      Rate and Rhythm: Normal rate and regular rhythm.      Pulses: Normal pulses.      Heart sounds: systolic Murmur resolved post dialysis.      Pulmonary:      Effort: Pulmonary effort is normal.      Breath sounds: Normal breath sounds.   Abdominal:      General: Bowel sounds are normal.      Palpations: Abdomen is soft.   Musculoskeletal:         General: Normal range of motion.      Cervical back: Normal range of motion and neck supple.      Right lower leg: No edema.      Left lower leg: No edema.   Skin:     General: Skin is warm and dry.      Capillary Refill: Capillary refill takes less than 2 seconds.   Neurological:      General: No focal deficit present.      Mental Status: He is alert and oriented to person, place, and time.   Psychiatric:         Mood and Affect: Mood normal.         Behavior: Behavior normal.         Thought Content: Thought content normal.         Judgment: Judgment normal      Pertinent  and/or Most Recent Results     Results from last 7 days   Lab Units 06/30/21  0554 06/29/21  1734   WBC 10*3/mm3 7.60 7.00   HEMOGLOBIN g/dL 9.4* 9.2*   HEMATOCRIT % 28.4* 27.9*   PLATELETS 10*3/mm3 205 209   SODIUM mmol/L 140 138   POTASSIUM mmol/L 5.5* 5.3*   CHLORIDE mmol/L 102 100   CO2 mmol/L 20.0* 19.0*   BUN mg/dL 89* 82*   CREATININE mg/dL 16.00* 15.01*   GLUCOSE mg/dL 103* 107*   CALCIUM mg/dL 7.9* 7.7*           Invalid input(s): PROT, LABALBU        Invalid  input(s): TG, LDLCALC, LDLREALC        Brief Urine Lab Results     None          Microbiology Results Abnormal     Procedure Component Value - Date/Time    COVID PRE-OP / PRE-PROCEDURE SCREENING ORDER (NO ISOLATION) - Swab, Nasopharynx [[id number]]  (Normal) Collected: 06/29/21 1840    Lab Status: Final result Specimen: Swab from Nasopharynx Updated: 06/29/21 1912    Narrative:      The following orders were created for panel order COVID PRE-OP / PRE-PROCEDURE SCREENING ORDER (NO ISOLATION) - Swab, Nasopharynx.  Procedure                               Abnormality         Status                     ---------                               -----------         ------                     COVID-19,CEPHEID,COR/DARRYN...[[id number]]  Normal              Final result                 Please view results for these tests on the individual orders.    COVID-19,CEPHEID,COR/DARRYN/PAD/RICK IN-HOUSE(OR EMERGENT/ADD-ON),NP SWAB IN TRANSPORT MEDIA 3-4 HR TAT, RT-PCR - Swab, Nasopharynx [[id number]]  (Normal) Collected: 06/29/21 1840    Lab Status: Final result Specimen: Swab from Nasopharynx Updated: 06/29/21 1912     COVID19 Not Detected    Narrative:      Fact sheet for providers: https://www.fda.gov/media/604422/download     Fact sheet for patients: https://www.fda.gov/media/548675/download  Fact sheet for providers: https://www.fda.gov/media/914249/download    Fact sheet for patients: https://www.fda.gov/media/042612/download    Test performed by PCR.          CT Head Without Contrast    Result Date: 6/29/2021  Impression: No acute intracranial abnormality.  Electronically Signed By-Iesha Winter MD On:6/29/2021 5:37 PM This report was finalized on [id number] by  Iesha Winter MD.      Results for orders placed during the hospital encounter of 05/29/21    Duplex Hemodialysis Access CAR    Interpretation Summary  · Patent right radiocephalic AV fistula with an anastomotic stenosis and adequate volume flow. 4 mm cephalic vein tributary  proximally.      Results for orders placed during the hospital encounter of 05/29/21    Duplex Hemodialysis Access CAR    Interpretation Summary  · Patent right radiocephalic AV fistula with an anastomotic stenosis and adequate volume flow. 4 mm cephalic vein tributary proximally.                  Test Results Pending at Discharge        Procedures Performed           Consults:   Consults     Date and Time Order Name Status Description    6/29/2021  6:10 PM Nephrology (on -call MD unless specified) Completed     6/23/2021  3:08 PM Nephrology (on -call MD unless specified) Completed     6/13/2021  4:04 PM Hospitalist (on-call MD unless specified) Completed     6/13/2021  3:17 PM Nephrology (on -call MD unless specified) Completed     6/3/2021 12:48 PM Inpatient Vascular Surgery Consult Completed     5/31/2021 11:43 AM Hematology & Oncology Inpatient Consult Completed     5/30/2021  4:24 PM Inpatient Hospitalist Consult Completed             Discharge Details        Discharge Medications      ASK your doctor about these medications      Instructions Start Date   acetaminophen 325 MG tablet  Commonly known as: TYLENOL   650 mg, Oral, Every 6 Hours PRN      calcitriol 0.25 MCG capsule  Commonly known as: ROCALTROL   0.25 mcg, Oral, Daily      cloNIDine 0.3 MG tablet  Commonly known as: CATAPRES   0.3 mg, Oral, 3 Times Daily      escitalopram 5 MG tablet  Commonly known as: LEXAPRO   5 mg, Oral, Daily, Has not been taking at home, cannot afford it      famotidine 20 MG tablet  Commonly known as: PEPCID   20 mg, Oral, Daily      folic acid 1 MG tablet  Commonly known as: FOLVITE   1 mg, Oral, Daily      losartan 50 MG tablet  Commonly known as: COZAAR   50 mg, Oral, Daily      NIFEdipine XL 90 MG 24 hr tablet  Commonly known as: PROCARDIA XL   45 mg, Oral, Daily PRN, Patient states moving to half tab prn, no longer taking      predniSONE 20 MG tablet  Commonly known as: DELTASONE   20 mg, Oral, Daily      sodium  bicarbonate 650 MG tablet   650 mg, Oral, 3 Times Daily             Allergies   Allergen Reactions   • Heparin Other (See Comments)     Patient is thrombocytopenic.  Avoiding heparin at this time.         Discharge Disposition:      Diet:  Hospital:  Diet Order   Procedures   • Diet Renal; 2gm Na+, 2gm K+         Discharge Activity:  As tolerated         CODE STATUS:    Code Status and Medical Interventions:   Ordered at: 06/29/21 3550     Code Status:    CPR     Medical Interventions (Level of Support Prior to Arrest):    Full         Follow-up Appointments  No future appointments.          Condition on Discharge:      Stable      This patient has been examined wearing appropriate Personal Protective Equipment. 07/01/21      Electronically signed by LAISHA Munoz, 07/01/21, 7:08 AM EDT.      Time: I spent  60  minutes on this discharge activity which included face-to-face encounter with the patient/reviewing the data in the system/coordination of the care with the nursing staff as well as consultants/documentation/entering orders.

## 2021-07-01 NOTE — CASE MANAGEMENT/SOCIAL WORK
Case Management Discharge Note      Final Note: home    Provided Post Acute Provider List?: N/A  Provided Post Acute Provider Quality & Resource List?: N/A    Selected Continued Care - Discharged on 7/1/2021 Admission date: 6/29/2021 - Discharge disposition: Home or Self Care                 Final Discharge Disposition Code: 01 - home or self-care

## 2021-07-01 NOTE — PLAN OF CARE
Goal Outcome Evaluation:  Plan of Care Reviewed With: patient        Progress: improving  Outcome Summary: Pt returned from dialysis after 22:30.  2.6L of fluid removed during HD. He was acutely hypertensive upon return to unit.  BP is somewhat improved after administration of clonidine.  Vital signs otherwise stable. Continue to monitor

## 2021-07-01 NOTE — PLAN OF CARE
Goal Outcome Evaluation:  Plan of Care Reviewed With: patient        Progress: improving  Outcome Summary: Patient had dialysis yesterday, and has now been set up for outpatient dialysis starting tomorrow. Cleared by nephrology to go home and to follow up. VSS, non monitored, will be discharged home    Problem: Adult Inpatient Plan of Care  Goal: Plan of Care Review  Outcome: Met  Flowsheets (Taken 7/1/2021 1057)  Progress: improving  Plan of Care Reviewed With: patient  Outcome Summary: Patient had dialysis yesterday, and has now been set up for outpatient dialysis starting tomorrow. Cleared by nephrology to go home and to follow up. VSS, non monitored, will be discharged home  Goal: Patient-Specific Goal (Individualized)  Outcome: Met  Goal: Absence of Hospital-Acquired Illness or Injury  Outcome: Met  Intervention: Identify and Manage Fall Risk  Recent Flowsheet Documentation  Taken 7/1/2021 1050 by Nevaeh Darnell RN  Safety Promotion/Fall Prevention: safety round/check completed  Taken 7/1/2021 0902 by Nevaeh Darnell RN  Safety Promotion/Fall Prevention: safety round/check completed  Taken 7/1/2021 0710 by Nevaeh Darnell RN  Safety Promotion/Fall Prevention:   safety round/check completed   assistive device/personal items within reach   clutter free environment maintained   lighting adjusted   nonskid shoes/slippers when out of bed   room organization consistent  Intervention: Prevent Skin Injury  Recent Flowsheet Documentation  Taken 7/1/2021 0710 by Nevaeh Darnell, RN  Body Position:   supine   sitting up in bed   position changed independently  Goal: Optimal Comfort and Wellbeing  Outcome: Met  Intervention: Provide Person-Centered Care  Recent Flowsheet Documentation  Taken 7/1/2021 0710 by Nevaeh Darnell RN  Trust Relationship/Rapport:   care explained   choices provided   emotional support provided   empathic listening provided   questions answered   questions encouraged   reassurance provided    thoughts/feelings acknowledged  Goal: Readiness for Transition of Care  Outcome: Met     Problem: Hypertension Comorbidity  Goal: Blood Pressure in Desired Range  Outcome: Met  Intervention: Maintain Hypertension-Management Strategies  Recent Flowsheet Documentation  Taken 7/1/2021 0710 by Nevaeh Darnell, RN  Medication Review/Management: medications reviewed

## 2021-07-02 ENCOUNTER — READMISSION MANAGEMENT (OUTPATIENT)
Dept: CALL CENTER | Facility: HOSPITAL | Age: 51
End: 2021-07-02

## 2021-07-02 NOTE — OUTREACH NOTE
Prep Survey      Responses   [facility] facility patient discharged from?  [facility]   Is LACE score < 7 ?  No   Emergency Room discharge w/ pulse ox?  No   Eligibility  Readm Mgmt   Discharge diagnosis  Nonintractable headache   Does the patient have one of the following disease processes/diagnoses(primary or secondary)?  Other   Does the patient have Home health ordered?  No   Is there a DME ordered?  No   Prep survey completed?  Yes          Mone Dykes RN

## 2021-07-06 ENCOUNTER — READMISSION MANAGEMENT (OUTPATIENT)
Dept: CALL CENTER | Facility: HOSPITAL | Age: 51
End: 2021-07-06

## 2021-07-06 NOTE — OUTREACH NOTE
Medical Week 1 Survey      Responses   [facility] patient discharged from?  [facility]   Does the patient have one of the following disease processes/diagnoses(primary or secondary)?  Other   Week 1 attempt successful?  No   Rescheduled  Revoked   Revoke  Phone number issues          Sadia Hightower RN

## 2021-07-13 ENCOUNTER — TRANSCRIBE ORDERS (OUTPATIENT)
Dept: ADMINISTRATIVE | Facility: HOSPITAL | Age: 51
End: 2021-07-13

## 2021-07-13 DIAGNOSIS — N18.6 END STAGE RENAL DISEASE (HCC): ICD-10-CM

## 2021-07-13 DIAGNOSIS — Z99.2 DEPENDENCE ON RENAL DIALYSIS (HCC): Primary | ICD-10-CM

## 2021-07-21 ENCOUNTER — HOSPITAL ENCOUNTER (OUTPATIENT)
Dept: CARDIOLOGY | Facility: HOSPITAL | Age: 51
Discharge: HOME | End: 2021-07-21

## 2021-07-21 ENCOUNTER — APPOINTMENT (OUTPATIENT)
Dept: VASCULAR SURGERY | Facility: HOSPITAL | Age: 51
End: 2021-07-21

## 2021-07-21 DIAGNOSIS — Z99.2 DEPENDENCE ON RENAL DIALYSIS (HCC): ICD-10-CM

## 2021-07-21 DIAGNOSIS — N18.6 END STAGE RENAL DISEASE (HCC): ICD-10-CM

## 2021-07-21 LAB
BH CV VAS DIALYSIS ARTERIAL ANASTOMOSIS DIAMETER: 0.35 CM
BH CV VAS DIALYSIS ARTERIAL ANASTOMOSIS EDV: 323 CM/SEC
BH CV VAS DIALYSIS ARTERIAL ANASTOMOSIS PSV: 573 CM/SEC
BH CV VAS DIALYSIS CONDUIT DIST DEPTH: 0.35 CM
BH CV VAS DIALYSIS CONDUIT DIST DIAMETER: 0.56 CM
BH CV VAS DIALYSIS CONDUIT DIST EDV: 69 CM/SEC
BH CV VAS DIALYSIS CONDUIT DIST FLOW VOL: 710 ML/MIN
BH CV VAS DIALYSIS CONDUIT DIST PSV: 107 CM/SEC
BH CV VAS DIALYSIS CONDUIT MID DEPTH: 0.28 CM
BH CV VAS DIALYSIS CONDUIT MID DIAMETER: 0.54 CM
BH CV VAS DIALYSIS CONDUIT MID EDV: 71 CM/SEC
BH CV VAS DIALYSIS CONDUIT MID FLOW VOL: 760 ML/MIN
BH CV VAS DIALYSIS CONDUIT MID PSV: 125 CM/SEC
BH CV VAS DIALYSIS CONDUIT PROX DEPTH: 0.26 CM
BH CV VAS DIALYSIS CONDUIT PROX DIAMETER: 0.61 CM
BH CV VAS DIALYSIS CONDUIT PROX EDV: 70 CM/SEC
BH CV VAS DIALYSIS CONDUIT PROX FLOW VOL: 694 ML/MIN
BH CV VAS DIALYSIS CONDUIT PROX PSV: 164 CM/SEC
BH CV VAS DIALYSIS PRE-INFLOW RADIAL DIAMETER: 0.46 CM
BH CV VAS DIALYSIS PRE-INFLOW RADIAL EDV: 118 CM/SEC
BH CV VAS DIALYSIS PRE-INFLOW RADIAL FLOW VOL: 588 ML/MIN
BH CV VAS DIALYSIS PRE-INFLOW RADIAL PSV: 205 CM/SEC
BH CV VAS DIALYSIS RIGHT BRANCH 1 DIAMETER: 0.27 CM
BH CV VAS DIALYSIS VENOUS OUTFLOW CEPHALIC VEIN DIAMETE: 0.67 CM
BH CV VAS DIALYSIS VENOUS OUTFLOW CEPHALIC VEIN EDV: 37 CM/SEC
BH CV VAS DIALYSIS VENOUS OUTFLOW CEPHALIC VEIN PSV: 67 CM/SEC
MAXIMAL PREDICTED HEART RATE: 168 BPM
STRESS TARGET HR: 143 BPM

## 2021-07-21 PROCEDURE — 93990 DOPPLER FLOW TESTING: CPT

## 2021-07-21 PROCEDURE — G0463 HOSPITAL OUTPT CLINIC VISIT: HCPCS

## 2021-07-29 ENCOUNTER — TRANSCRIBE ORDERS (OUTPATIENT)
Dept: ADMINISTRATIVE | Facility: HOSPITAL | Age: 51
End: 2021-07-29

## 2021-07-29 DIAGNOSIS — Z99.2 ENCOUNTER FOR EXTRACORPOREAL DIALYSIS (HCC): Primary | ICD-10-CM

## 2021-07-29 DIAGNOSIS — N18.6 END STAGE RENAL DISEASE (HCC): ICD-10-CM

## 2021-09-24 ENCOUNTER — HOSPITAL ENCOUNTER (OUTPATIENT)
Dept: INTERVENTIONAL RADIOLOGY/VASCULAR | Facility: HOSPITAL | Age: 51
Discharge: HOME | End: 2021-09-24
Admitting: INTERNAL MEDICINE

## 2021-09-24 VITALS — BODY MASS INDEX: 22.53 KG/M2 | HEIGHT: 64 IN | WEIGHT: 132 LBS

## 2021-09-24 DIAGNOSIS — N18.6 ESRD (END STAGE RENAL DISEASE) (HCC): ICD-10-CM

## 2021-09-24 RX ORDER — LIDOCAINE HYDROCHLORIDE 10 MG/ML
INJECTION, SOLUTION EPIDURAL; INFILTRATION; INTRACAUDAL; PERINEURAL
Status: COMPLETED | OUTPATIENT
Start: 2021-09-24 | End: 2021-09-24

## 2021-09-24 RX ADMIN — LIDOCAINE HYDROCHLORIDE 6 ML: 10 INJECTION, SOLUTION EPIDURAL; INFILTRATION; INTRACAUDAL; PERINEURAL at 08:17

## 2021-09-24 NOTE — DISCHARGE INSTRUCTIONS
Permcath Removal, Adult, Care After  This sheet gives you information about how to care for yourself after your procedure. Your health care provider may also give you more specific instructions. If you have problems or questions, contact your health care provider.  What can I expect after the procedure?  After your procedure, it is common to have:  · Tenderness or soreness.  · Redness, swelling, or a scab where the perm cath was removed (exit site).  Follow these instructions at home:  For the first 24 hours after the procedure  · Keep the bandage (dressing) on the exit site clean and dry. Do not remove the dressing until your health care provider tells you to do so.  · Check your arm often for signs and symptoms of an infection. Check for:  ? A red streak that spreads away from the dressing.  ? Blood or fluid that you can see on the dressing.  ? More redness or swelling.  · Do not lift anything heavy or do activities that require great effort until your health care provider says it is okay. You should avoid:  ? Lifting weights.  ? Yard work.  ? Any physical activity with repetitive arm movement.  · Watch closely for any signs of an air bubble in the vein (air embolism). This is a rare but serious complication. If you have signs of air embolism, call 911 immediately and lie down on your left side to keep the air from moving into the lungs. Signs of an air embolism include:  ? Difficulty breathing.  ? Chest pain.  ? Coughing or wheezing.  ? Skin that is pale, blue, cold, or clammy.  ? Rapid pulse.  ? Rapid breathing.  ? Fainting.  ? Blood, fluid, or pus coming from the exit site.  ? Warmth or a bad smell at the exit site.  ? A red streak spreading away from the exit site.    Have hemodialysis nurses change dressing as needed.  DO NOT SHOWER until site is completely healed or when told by hemodialysis nurses that the site is healed.     General instructions  · Take over-the-counter and prescription medicines only as  told by your health care provider. Do not take any new medicines without checking with your health care provider first.  · If you were prescribed an antibiotic medicine, apply or take it as told by your health care provider. Do not stop using the antibiotic even if your condition improves.  · Keep all follow-up visits as told by your health care provider. This is important.  Contact a health care provider if:  · You have a fever or chills.  · You have soreness, redness, or swelling on your exit site, and it gets worse.  · You have swollen glands under your arm.  · You have any of the following symptoms at your exit site:  ? Blood, fluid, or pus.  ? Unusual warmth.  ? A bad smell.  ? A red streak spreading away from the exit site.  Get help right away if:  · You have numbness or tingling in your fingers, hand, or arm.  · Your arm looks blue and feels cold.  · You have signs of an air embolism, such as:  ? Difficulty breathing.  ? Chest pain.  ? Coughing or wheezing.  ? Skin that is pale, blue, cold, or clammy.  ? Rapid pulse.  ? Rapid breathing.  ? Fainting.  These symptoms may represent a serious problem that is an emergency. Do not wait to see if the symptoms will go away. Get medical help right away. Call your local emergency services (911 in the [place] Do not drive yourself to the hospital.  Summary  · After your procedure, it is common to have tenderness or soreness, redness, swelling, or a scab at the exit site.  · Keep the dressing over the exit site clean and dry. Do not remove the dressing until your health care provider tells you to do so.  · Do not lift anything heavy or do activities that require great effort until your health care provider says it is okay.  · Watch closely for any signs of an air embolism. If you have signs of air embolism, call 911 immediately and lie down on your left side.  This information is not intended to replace advice given to you by your health care provider. Make sure you  discuss any questions you have with your health care provider.  Document Revised: 04/28/2021 Document Reviewed: 04/28/2021  Elsevier Patient Education © 2021 [employer]

## 2021-10-20 ENCOUNTER — APPOINTMENT (OUTPATIENT)
Dept: VASCULAR SURGERY | Facility: HOSPITAL | Age: 51
End: 2021-10-20

## 2021-10-20 ENCOUNTER — HOSPITAL ENCOUNTER (OUTPATIENT)
Dept: CARDIOLOGY | Facility: HOSPITAL | Age: 51
Discharge: HOME | End: 2021-10-20
Admitting: PHYSICIAN ASSISTANT

## 2021-10-20 DIAGNOSIS — Z99.2 ENCOUNTER FOR EXTRACORPOREAL DIALYSIS (HCC): ICD-10-CM

## 2021-10-20 DIAGNOSIS — N18.6 END STAGE RENAL DISEASE (HCC): ICD-10-CM

## 2021-10-20 LAB
BH CV VAS DIALYSIS ARTERIAL ANASTOMOSIS EDV: 329 CM/SEC
BH CV VAS DIALYSIS ARTERIAL ANASTOMOSIS PSV: 597 CM/SEC
BH CV VAS DIALYSIS CONDUIT DIST DEPTH: 0.39 CM
BH CV VAS DIALYSIS CONDUIT DIST DIAMETER: 0.64 CM
BH CV VAS DIALYSIS CONDUIT DIST EDV: 81 CM/SEC
BH CV VAS DIALYSIS CONDUIT DIST FLOW VOL: 916 ML/MIN
BH CV VAS DIALYSIS CONDUIT DIST PSV: 107 CM/SEC
BH CV VAS DIALYSIS CONDUIT MID DEPTH: 0.25 CM
BH CV VAS DIALYSIS CONDUIT MID DIAMETER: 0.64 CM
BH CV VAS DIALYSIS CONDUIT MID EDV: 102 CM/SEC
BH CV VAS DIALYSIS CONDUIT MID FLOW VOL: 1031 ML/MIN
BH CV VAS DIALYSIS CONDUIT MID PSV: 167 CM/SEC
BH CV VAS DIALYSIS CONDUIT PROX DEPTH: 0.29 CM
BH CV VAS DIALYSIS CONDUIT PROX DIAMETER: 0.51 CM
BH CV VAS DIALYSIS CONDUIT PROX EDV: 121 CM/SEC
BH CV VAS DIALYSIS CONDUIT PROX FLOW VOL: 584 ML/MIN
BH CV VAS DIALYSIS CONDUIT PROX PSV: 206 CM/SEC
BH CV VAS DIALYSIS PRE-INFLOW RADIAL DIAMETER: 0.46 CM
BH CV VAS DIALYSIS PRE-INFLOW RADIAL EDV: 87 CM/SEC
BH CV VAS DIALYSIS PRE-INFLOW RADIAL FLOW VOL: 603 ML/MIN
BH CV VAS DIALYSIS PRE-INFLOW RADIAL PSV: 147 CM/SEC
BH CV VAS DIALYSIS VENOUS OUTFLOW CEPHALIC VEIN DIAMETE: 0.67 CM
BH CV VAS DIALYSIS VENOUS OUTFLOW CEPHALIC VEIN EDV: 60 CM/SEC
BH CV VAS DIALYSIS VENOUS OUTFLOW CEPHALIC VEIN PSV: 861 CM/SEC
MAXIMAL PREDICTED HEART RATE: 168 BPM
STRESS TARGET HR: 143 BPM

## 2021-10-20 PROCEDURE — 93990 DOPPLER FLOW TESTING: CPT

## 2021-10-27 ENCOUNTER — APPOINTMENT (OUTPATIENT)
Dept: VASCULAR SURGERY | Facility: HOSPITAL | Age: 51
End: 2021-10-27

## 2021-10-27 PROCEDURE — G0463 HOSPITAL OUTPT CLINIC VISIT: HCPCS

## 2022-04-06 ENCOUNTER — TRANSCRIBE ORDERS (OUTPATIENT)
Dept: ADMINISTRATIVE | Facility: HOSPITAL | Age: 52
End: 2022-04-06

## 2022-04-06 DIAGNOSIS — Z99.2 ENCOUNTER FOR EXTRACORPOREAL DIALYSIS: Primary | ICD-10-CM

## 2022-04-06 DIAGNOSIS — N18.6 END STAGE RENAL DISEASE: ICD-10-CM

## 2022-11-27 NOTE — PLAN OF CARE
Problem: Adult Inpatient Plan of Care  Goal: Plan of Care Review  Outcome: Ongoing, Progressing  Flowsheets (Taken 5/24/2021 0253)  Progress: no change  Plan of Care Reviewed With: patient  Outcome Summary: awaiting dialysis nurse to come in to do dialysis tx  Goal: Patient-Specific Goal (Individualized)  Outcome: Ongoing, Progressing  Goal: Absence of Hospital-Acquired Illness or Injury  Outcome: Ongoing, Progressing  Intervention: Identify and Manage Fall Risk  Recent Flowsheet Documentation  Taken 5/23/2021 2300 by Birgit Hernández RN  Safety Promotion/Fall Prevention: assistive device/personal items within reach  Taken 5/23/2021 1900 by Birgit Hernández RN  Safety Promotion/Fall Prevention:   assistive device/personal items within reach   safety round/check completed   lighting adjusted  Intervention: Prevent Skin Injury  Recent Flowsheet Documentation  Taken 5/23/2021 2300 by Birgit Hernández RN  Body Position: position changed independently  Taken 5/23/2021 1900 by Birgit Hernández RN  Body Position: position changed independently  Intervention: Prevent Infection  Recent Flowsheet Documentation  Taken 5/23/2021 2300 by Birgit Hernández RN  Infection Prevention: rest/sleep promoted  Taken 5/23/2021 1900 by Birgit Hernández RN  Infection Prevention: rest/sleep promoted  Goal: Optimal Comfort and Wellbeing  Outcome: Ongoing, Progressing  Intervention: Provide Person-Centered Care  Recent Flowsheet Documentation  Taken 5/23/2021 2300 by Birgit Hernández RN  Trust Relationship/Rapport: questions answered  Taken 5/23/2021 1900 by Birgit Hernández RN  Trust Relationship/Rapport: care explained  Goal: Readiness for Transition of Care  Outcome: Ongoing, Progressing   Goal Outcome Evaluation:  Plan of Care Reviewed With: patient  Progress: no change  Outcome Summary: awaiting dialysis nurse to come in to do dialysis tx  
  Problem: Adult Inpatient Plan of Care  Goal: Plan of Care Review  Outcome: Ongoing, Progressing  Flowsheets (Taken 5/24/2021 1312)  Plan of Care Reviewed With: patient  Outcome Summary: Patient completed dialysis. Monitoring his b/p. Once his b/p is down he will be discahrged. Otherwise stable. Able to communicate well with him. No complaints of pain.   Goal Outcome Evaluation:  Plan of Care Reviewed With: patient     Outcome Summary: Patient completed dialysis. Monitoring his b/p. Once his b/p is down he will be discahrged. Otherwise stable. Able to communicate well with him. No complaints of pain.  
Goal Outcome Evaluation:  Plan of Care Reviewed With: patient, family  Progress: improving  Outcome Summary: New admit awaiting dialysis  
Yes

## 2024-03-12 ENCOUNTER — INPATIENT HOSPITAL (OUTPATIENT)
Dept: URBAN - METROPOLITAN AREA HOSPITAL 76 | Facility: HOSPITAL | Age: 54
End: 2024-03-12
Payer: MEDICAID

## 2024-03-12 DIAGNOSIS — D64.9 ANEMIA, UNSPECIFIED: ICD-10-CM

## 2024-03-12 PROCEDURE — 99222 1ST HOSP IP/OBS MODERATE 55: CPT | Performed by: INTERNAL MEDICINE

## 2024-03-13 ENCOUNTER — INPATIENT HOSPITAL (OUTPATIENT)
Dept: URBAN - METROPOLITAN AREA HOSPITAL 76 | Facility: HOSPITAL | Age: 54
End: 2024-03-13
Payer: MEDICAID

## 2024-03-13 DIAGNOSIS — K31.89 OTHER DISEASES OF STOMACH AND DUODENUM: ICD-10-CM

## 2024-03-13 DIAGNOSIS — D50.0 IRON DEFICIENCY ANEMIA SECONDARY TO BLOOD LOSS (CHRONIC): ICD-10-CM

## 2024-03-13 DIAGNOSIS — K57.30 DIVERTICULOSIS OF LARGE INTESTINE WITHOUT PERFORATION OR ABS: ICD-10-CM

## 2024-03-13 DIAGNOSIS — K62.1 RECTAL POLYP: ICD-10-CM

## 2024-03-13 PROCEDURE — 45380 COLONOSCOPY AND BIOPSY: CPT | Performed by: INTERNAL MEDICINE

## 2024-03-13 PROCEDURE — 43239 EGD BIOPSY SINGLE/MULTIPLE: CPT | Performed by: INTERNAL MEDICINE
